# Patient Record
Sex: MALE | Race: WHITE | Employment: FULL TIME | URBAN - METROPOLITAN AREA
[De-identification: names, ages, dates, MRNs, and addresses within clinical notes are randomized per-mention and may not be internally consistent; named-entity substitution may affect disease eponyms.]

---

## 2017-01-10 DIAGNOSIS — E11.21 TYPE 2 DIABETES MELLITUS WITH DIABETIC NEPHROPATHY (H): Primary | Chronic | ICD-10-CM

## 2017-01-10 NOTE — TELEPHONE ENCOUNTER
metFORMIN (GLUCOPHAGE-XR) 500 MG 24 hr tablet 360 tablet 1 7/14/2016            Last Written Prescription Date: 07/14/16  Last Fill Quantity: 360, # refills: 1  Last Office Visit with [facility], P or [facility] prescribing provider:  12/15/15        BP Readings from Last 3 Encounters:   06/14/16 136/70   06/12/16 136/62   05/02/16 186/100     MICROL      126   5/7/2015  No results found for this basename: microalbumin  CREATININE   Date Value Ref Range Status   06/12/2016 0.82 0.66 - 1.25 mg/dL Final   ]  GFR ESTIMATE   Date Value Ref Range Status   06/12/2016 >90  Non  GFR Calc   >60 mL/min/1.7m2 Final   05/02/2016 77 >60 mL/min/1.7m2 Final     Comment:     Non  GFR Calc   12/15/2015 >90  Non  GFR Calc   >60 mL/min/1.7m2 Final     GFR ESTIMATE IF BLACK   Date Value Ref Range Status   06/12/2016 >90   GFR Calc   >60 mL/min/1.7m2 Final   05/02/2016 >90   GFR Calc   >60 mL/min/1.7m2 Final   12/15/2015 >90   GFR Calc   >60 mL/min/1.7m2 Final     CHOL      200   12/15/2015  HDL       44   12/15/2015  LDL       98   12/15/2015  TRIG      288   12/15/2015  CHOLHDLRATIO      4.8   5/7/2015  AST       38   5/2/2016  ALT       96   5/2/2016  A1C      7.2   6/13/2016  A1C      7.1   12/15/2015  A1C      7.1   5/7/2015  A1C      7.2   3/19/2014  A1C      6.0   9/18/2013  POTASSIUM   Date Value Ref Range Status   06/12/2016 3.8 3.4 - 5.3 mmol/L Final

## 2017-01-11 RX ORDER — METFORMIN HCL 500 MG
1000 TABLET, EXTENDED RELEASE 24 HR ORAL 2 TIMES DAILY WITH MEALS
Qty: 120 TABLET | Refills: 0 | Status: SHIPPED
Start: 2017-01-11 | End: 2017-03-03

## 2017-01-11 NOTE — TELEPHONE ENCOUNTER
Medication is being filled for 1 time refill only due to:  DUE FOR OFFICE VISIT AND LABS, NOTED ON RX   Ayanna Pineda RN

## 2017-02-06 ENCOUNTER — TELEPHONE (OUTPATIENT)
Dept: FAMILY MEDICINE | Facility: CLINIC | Age: 57
End: 2017-02-06

## 2017-02-06 DIAGNOSIS — Z79.01 LONG TERM (CURRENT) USE OF ANTICOAGULANTS: ICD-10-CM

## 2017-02-06 DIAGNOSIS — I82.409 DVT (DEEP VENOUS THROMBOSIS) (H): Primary | ICD-10-CM

## 2017-02-06 NOTE — TELEPHONE ENCOUNTER
Reason for call: Medication   If this is a refill request, has the caller requested the refill from the pharmacy already? Yes  Will the patient be using a [facility]? No  Name of the pharmacy and phone number for the current request:   [facility] - [address]             Name of the medication requested: Eliquis 5mg    Other request: N/A    Phone Number Pt can be reached at: Work number on file:  [phone] (work)  Best Time: With any further questions  Can we leave a detailed message on this number? YES

## 2017-02-06 NOTE — TELEPHONE ENCOUNTER
Ordered, patient called and notified that his Eliquis was ordered for another 30 days only until his 3/3/17 Physical with Dr. Pradhan.  Patient agrees with the plan.  Leanne Olivo RN

## 2017-02-11 DIAGNOSIS — I10 ESSENTIAL HYPERTENSION: Primary | Chronic | ICD-10-CM

## 2017-02-13 RX ORDER — ATORVASTATIN CALCIUM 40 MG/1
TABLET, FILM COATED ORAL
Qty: 30 TABLET | Refills: 0 | Status: SHIPPED | OUTPATIENT
Start: 2017-02-13 | End: 2017-03-03

## 2017-02-13 NOTE — TELEPHONE ENCOUNTER
Medication is being filled for 1 time refill only due to:  upcoming appt   Jennifer ESQUIVEL RN

## 2017-02-13 NOTE — TELEPHONE ENCOUNTER
atorvastatin (LIPITOR) 40 MG tablet 90 tablet 3 12/15/2015          Last Written Prescription Date: 12/15/2015  Last Fill Quantity: 90, # refills: 3  Last Office Visit with [facility], [facility] or [facility] prescribing provider: 12/15/2015  Next 5 appointments (look out 90 days)     Mar 03, 2017  9:00 AM CST   [facility]    [address] [facility] [address]   [phone]                   Potassium   Date Value Ref Range Status   06/12/2016 3.8 3.4 - 5.3 mmol/L Final     Creatinine   Date Value Ref Range Status   06/12/2016 0.82 0.66 - 1.25 mg/dL Final     BP Readings from Last 3 Encounters:   06/14/16 136/70   06/12/16 136/62   05/02/16 (!) 186/100

## 2017-03-03 ENCOUNTER — OFFICE VISIT (OUTPATIENT)
Dept: FAMILY MEDICINE | Facility: CLINIC | Age: 57
End: 2017-03-03
Payer: COMMERCIAL

## 2017-03-03 VITALS
DIASTOLIC BLOOD PRESSURE: 62 MMHG | SYSTOLIC BLOOD PRESSURE: 126 MMHG | HEART RATE: 82 BPM | WEIGHT: 315 LBS | OXYGEN SATURATION: 94 % | BODY MASS INDEX: 40.43 KG/M2 | HEIGHT: 74 IN | TEMPERATURE: 99 F

## 2017-03-03 DIAGNOSIS — Z11.59 NEED FOR HEPATITIS C SCREENING TEST: ICD-10-CM

## 2017-03-03 DIAGNOSIS — I82.451: ICD-10-CM

## 2017-03-03 DIAGNOSIS — L71.9 ROSACEA: ICD-10-CM

## 2017-03-03 DIAGNOSIS — R68.82 DECREASED SEX DRIVE: ICD-10-CM

## 2017-03-03 DIAGNOSIS — I10 ESSENTIAL HYPERTENSION: Chronic | ICD-10-CM

## 2017-03-03 DIAGNOSIS — L84 CORN OF TOE: ICD-10-CM

## 2017-03-03 DIAGNOSIS — Z87.442 HISTORY OF NEPHROLITHIASIS: ICD-10-CM

## 2017-03-03 DIAGNOSIS — E11.21 TYPE 2 DIABETES MELLITUS WITH DIABETIC NEPHROPATHY, WITHOUT LONG-TERM CURRENT USE OF INSULIN (H): Chronic | ICD-10-CM

## 2017-03-03 DIAGNOSIS — Z79.01 LONG TERM (CURRENT) USE OF ANTICOAGULANTS: ICD-10-CM

## 2017-03-03 DIAGNOSIS — Z00.01 ENCOUNTER FOR PREVENTATIVE ADULT HEALTH CARE EXAM WITH ABNORMAL FINDINGS: Primary | ICD-10-CM

## 2017-03-03 DIAGNOSIS — E66.01 MORBID OBESITY, UNSPECIFIED OBESITY TYPE (H): ICD-10-CM

## 2017-03-03 DIAGNOSIS — L91.8 SKIN TAG: ICD-10-CM

## 2017-03-03 LAB
D DIMER PPP FEU-MCNC: 0.2 UG/ML FEU (ref 0–0.5)
ERYTHROCYTE [DISTWIDTH] IN BLOOD BY AUTOMATED COUNT: 13 % (ref 10–15)
HBA1C MFR BLD: 7.5 % (ref 4.3–6)
HCT VFR BLD AUTO: 38.2 % (ref 40–53)
HCV AB SERPL QL IA: NORMAL
HGB BLD-MCNC: 12.4 G/DL (ref 13.3–17.7)
MCH RBC QN AUTO: 30.8 PG (ref 26.5–33)
MCHC RBC AUTO-ENTMCNC: 32.5 G/DL (ref 31.5–36.5)
MCV RBC AUTO: 95 FL (ref 78–100)
PLATELET # BLD AUTO: 181 10E9/L (ref 150–450)
RBC # BLD AUTO: 4.02 10E12/L (ref 4.4–5.9)
WBC # BLD AUTO: 6.7 10E9/L (ref 4–11)

## 2017-03-03 PROCEDURE — 80053 COMPREHEN METABOLIC PANEL: CPT | Performed by: INTERNAL MEDICINE

## 2017-03-03 PROCEDURE — 82607 VITAMIN B-12: CPT | Performed by: INTERNAL MEDICINE

## 2017-03-03 PROCEDURE — 86803 HEPATITIS C AB TEST: CPT | Performed by: INTERNAL MEDICINE

## 2017-03-03 PROCEDURE — 82043 UR ALBUMIN QUANTITATIVE: CPT | Performed by: INTERNAL MEDICINE

## 2017-03-03 PROCEDURE — 85027 COMPLETE CBC AUTOMATED: CPT | Performed by: INTERNAL MEDICINE

## 2017-03-03 PROCEDURE — 84443 ASSAY THYROID STIM HORMONE: CPT | Performed by: INTERNAL MEDICINE

## 2017-03-03 PROCEDURE — 99396 PREV VISIT EST AGE 40-64: CPT | Performed by: INTERNAL MEDICINE

## 2017-03-03 PROCEDURE — 85379 FIBRIN DEGRADATION QUANT: CPT | Performed by: INTERNAL MEDICINE

## 2017-03-03 PROCEDURE — 80061 LIPID PANEL: CPT | Performed by: INTERNAL MEDICINE

## 2017-03-03 PROCEDURE — 99212 OFFICE O/P EST SF 10 MIN: CPT | Mod: 25 | Performed by: INTERNAL MEDICINE

## 2017-03-03 PROCEDURE — 84403 ASSAY OF TOTAL TESTOSTERONE: CPT | Performed by: INTERNAL MEDICINE

## 2017-03-03 PROCEDURE — 83036 HEMOGLOBIN GLYCOSYLATED A1C: CPT | Performed by: INTERNAL MEDICINE

## 2017-03-03 PROCEDURE — 36415 COLL VENOUS BLD VENIPUNCTURE: CPT | Performed by: INTERNAL MEDICINE

## 2017-03-03 RX ORDER — METFORMIN HCL 500 MG
1000 TABLET, EXTENDED RELEASE 24 HR ORAL 2 TIMES DAILY WITH MEALS
Qty: 360 TABLET | Refills: 3 | Status: SHIPPED | OUTPATIENT
Start: 2017-03-03 | End: 2018-04-09

## 2017-03-03 RX ORDER — LISINOPRIL 10 MG/1
10 TABLET ORAL DAILY
Qty: 90 TABLET | Refills: 3 | Status: SHIPPED | OUTPATIENT
Start: 2017-03-03 | End: 2018-02-26

## 2017-03-03 RX ORDER — ATORVASTATIN CALCIUM 40 MG/1
40 TABLET, FILM COATED ORAL DAILY
Qty: 90 TABLET | Refills: 3 | Status: SHIPPED | OUTPATIENT
Start: 2017-03-03 | End: 2018-03-28

## 2017-03-03 RX ORDER — MINOCYCLINE HYDROCHLORIDE 100 MG/1
100 CAPSULE ORAL DAILY
Qty: 90 CAPSULE | Refills: 3 | Status: SHIPPED | OUTPATIENT
Start: 2017-03-03 | End: 2018-04-09

## 2017-03-03 NOTE — PROGRESS NOTES
SUBJECTIVE:     CC: Peterson Vazquez is an 56 year old male who presents for preventative health visit.     Healthy Habits:    Do you get at least three servings of calcium containing foods daily (dairy, green leafy vegetables, etc.)? yes    Amount of exercise or daily activities, outside of work: 0 day(s) per week    Problems taking medications regularly No    Medication side effects: No    Have you had an eye exam in the past two years? yes    Do you see a dentist twice per year? yes    Do you have sleep apnea, excessive snoring or daytime drowsiness? yes, currently using CPAP    Adrian presents to the clinic today for his annual physical. He is fasting today.    Stressors- His father is [place] very well. He is happy and making friends. Adrian says it was a rough time and he got sick twice in the past few months. Work is not a main stressor- more watching his father's health fade. He knows he needs to start taking better care of himself. He works 12 hour days and says it is very tough to fit in exercise. The political scene also adds to his stress. Wife supportive.    H/o DVT- Placed on Eliquis 5 mg d/t right peroneal DVT in his leg in June. Denies bleeding or excessive ecchymosis. We discussed following up with hematology to determine future risk of clots and continuation of Eliquis since he did not f/u with me or them after his clot. No known provoking factors other than obesity and chronic venous insufficiency . Cellulitis resolved.    Of Note-  Uses CPAP every night.  Does not check his blood sugar. Denies nerve pain but notes paresthesia.  Complains of decreased sex drive, doesn't believe its an ED issue, but a mental or hormonal.   Kidney stone that Adrian said passed on its own. He noted dark gritty sand in his urine. Denies problems since then.   Tolerating minocycline 100 mg for rosacea and wants to continue it.  Denies urinary changes or family h/o prostate cancer.  Denies recent changes in  "moles, or painful itchy spots.    Today's PHQ-2 Score:   PHQ-2 ( 1999 [employer]) 3/3/2017 6/12/2016   Q1: Little interest or pleasure in doing things 0 0   Q2: Feeling down, depressed or hopeless 0 0   PHQ-2 Score 0 0       Abuse: Current or Past(Physical, Sexual or Emotional)- No  Do you feel safe in your environment - Yes    Social History   Substance Use Topics     Smoking status: Never Smoker     Smokeless tobacco: Never Used     Alcohol use 0.0 oz/week     0 Standard drinks or equivalent per week      Comment: rarely     The patient does not drink >3 drinks per day nor >7 drinks per week.    Last PSA: No results found for: PSA    Recent Labs   Lab Test  12/15/15   1334  05/07/15   1005  03/19/14   0846   CHOL  200*  171  200*   HDL  44  36*  40*   LDL  98  93  103   TRIG  288*  208*  283*   CHOLHDLRATIO   --   4.8  5.0   NHDL  156*   --    --        Reviewed orders with patient. Reviewed health maintenance and updated orders accordingly - Yes    Reviewed and updated as needed this visit by clinical staff  Reviewed and updated as needed this visit by Provider       ROS:  Comprehensive ROS negative unless as stated above in HPI.    This document serves as a record of the services and decisions personally performed and made by Yazmin Pradhan DO. It was created on his/her behalf by Yola Bobo, a trained medical scribe. The creation of this document is based the provider's statements to the medical scribe.  Scribsonia Bobo 9:11 AM, March 3, 2017  OBJECTIVE:     /62 (BP Location: Right arm, Patient Position: Chair, Cuff Size: Adult Large)  Pulse 82  Temp 99  F (37.2  C) (Oral)  Ht 6' 2\" (1.88 m)  Wt (!) 341 lb (154.7 kg)  SpO2 94%  BMI 43.78 kg/m2  EXAM:  GENERAL: obese, alert and no distress  EYES: Eyes grossly normal to inspection, PERRL and conjunctivae and sclerae normal  HENT: ear canals and TM's normal, nose and mouth without ulcers or lesions, slight cerumen bilaterally, scab on the " tip of nose  NECK: no adenopathy, no asymmetry, masses, or scars and thyroid normal to palpation  RESP: lungs clear to auscultation - no rales, rhonchi or wheezes  CV: regular rate and rhythm, normal S1 S2, no S3 or S4, no murmur, click or rub, no carotid bruits, 3+ pitting pedal edema right and 2+ on left  ABDOMEN: soft, nontender, no hepatosplenomegaly, no masses and bowel sounds normal, morbidly obese  MS: no gross musculoskeletal defects noted, right lower extremity edema and dusky erythema d/t venous stasis, no pain with palpation, third toe right foot callous, dorsal pedalis pulse 2+ left, 1+ right, wears diabetic socks   SKIN: no suspicious lesions or rashes, seborrheic keratosis on neck   NEURO: Normal strength and tone, mentation intact and speech normal  PSYCH: mentation appears normal, affect normal/bright    ASSESSMENT/PLAN:     1. Encounter for preventative adult health care exam with abnormal findings  He has awareness to start working on caring for himself now that his Dad moved out of their home and he can have less caregiver stress    2. Type 2 diabetes mellitus with diabetic nephropathy, without long-term current use of insulin and with morbid obesity  Not checking sugars  - FOOT EXAM  NO CHARGE [30267.114]  - HEMOGLOBIN A1C  - Lipid panel reflex to direct LDL  - Albumin Random Urine Quantitative  - metFORMIN (GLUCOPHAGE-XR) 500 MG 24 hr tablet; Take 2 tablets (1,000 mg) by mouth 2 times daily (with meals)  Dispense: 360 tablet; Refill: 3  - Comprehensive metabolic panel  - TSH with free T4 reflex  - Vitamin B12    3. Essential hypertension; goal < 140/90  At goal  - atorvastatin (LIPITOR) 40 MG tablet; Take 1 tablet (40 mg) by mouth daily  Dispense: 90 tablet; Refill: 3  - lisinopril (PRINIVIL/ZESTRIL) 10 MG tablet; Take 1 tablet (10 mg) by mouth daily  Dispense: 90 tablet; Refill: 3    4. Rosacea  Well controlled  - minocycline (MINOCIN/DYNACIN) 100 MG capsule; Take 1 capsule (100 mg) by mouth  "daily Take with full glass of water  Dispense: 90 capsule; Refill: 3    5. Deep venous thrombosis (DVT) of right peroneal vein (H)  D-dimer as baseline  Needs f/u work up with heme as no known risk factors for his clot other than obesity and chronic venous insufficiency   - D dimer, quantitative  - CBC with platelets  - ONC/HEME ADULT REFERRAL    6. Decreased sex drive  Discussed likely r/t obesity and diabetes as well as age but will check testosterone   - Testosterone, total    7. Long term (current) use of anticoagulants  Continue Eliquis until seen by heme  - apixaban ANTICOAGULANT (ELIQUIS) 5 MG tablet; Take 1 tablet (5 mg) by mouth 2 times daily  Dispense: 60 tablet; Refill: 0    8. Need for hepatitis C screening test  - Hepatitis C Screen Reflex to HCV RNA Quant and Genotype    9. Skin lesion  - DERMATOLOGY REFERRAL   As noted above in exam    10. Corn of toe  - PODIATRY/FOOT & ANKLE SURGERY REFERRAL    COUNSELING:  Reviewed preventive health counseling, as reflected in patient instructions       Regular exercise       Healthy diet/nutrition   reports that he has never smoked. He has never used smokeless tobacco.  Estimated body mass index is 43.78 kg/(m^2) as calculated from the following:    Height as of this encounter: 6' 2\" (1.88 m).    Weight as of this encounter: 341 lb (154.7 kg).   Weight management plan: Discussed healthy diet and exercise guidelines and patient will follow up in 12 months in clinic to re-evaluate.    Patient Instructions   Labs today  Think about reading a book called \"Younger Next Year\"  Referral for hematology, podiatry and dermatology  Follow up in 6 months or as needed based on labs    The information in this document, created by the medical scribe for me, accurately reflects the services I personally performed and the decisions made by me. I have reviewed and approved this document for accuracy prior to leaving the patient care area.  Yazmin Pradhan, DO  9:10 AM, " 03/03/17    Yazmin Pradhan, [facility]

## 2017-03-03 NOTE — PATIENT INSTRUCTIONS
"Labs today  Think about reading a book called \"Younger Next Year\"  Referral for hematology, podiatry and dermatology  Follow up in 6 months or as needed based on labs    Preventive Health Recommendations  Male Ages 50   64    Yearly exam:             See your health care provider every year in order to  o   Review health changes.   o   Discuss preventive care.    o   Review your medicines if your doctor has prescribed any.     Have a cholesterol test every 5 years, or more frequently if you are at risk for high cholesterol/heart disease.     Have a diabetes test (fasting glucose) every three years. If you are at risk for diabetes, you should have this test more often.     Have a colonoscopy at age 50, or have a yearly FIT test (stool test). These exams will check for colon cancer.      Talk with your health care provider about whether or not a prostate cancer screening test (PSA) is right for you.    You should be tested each year for STDs (sexually transmitted diseases), if you re at risk.     Shots: Get a flu shot each year. Get a tetanus shot every 10 years.     Nutrition:    Eat at least 5 servings of fruits and vegetables daily.     Eat whole-grain bread, whole-wheat pasta and brown rice instead of white grains and rice.     Talk to your provider about Calcium and Vitamin D.     Lifestyle    Exercise for at least 150 minutes a week (30 minutes a day, 5 days a week). This will help you control your weight and prevent disease.     Limit alcohol to one drink per day.     No smoking.     Wear sunscreen to prevent skin cancer.     See your dentist every six months for an exam and cleaning.     See your eye doctor every 1 to 2 years.    "

## 2017-03-03 NOTE — NURSING NOTE
"Chief Complaint   Patient presents with     Physical       Initial /62 (BP Location: Right arm, Patient Position: Chair, Cuff Size: Adult Large)  Pulse 82  Temp 99  F (37.2  C) (Oral)  Ht 6' 2\" (1.88 m)  Wt (!) 341 lb (154.7 kg)  SpO2 94%  BMI 43.78 kg/m2 Estimated body mass index is 43.78 kg/(m^2) as calculated from the following:    Height as of this encounter: 6' 2\" (1.88 m).    Weight as of this encounter: 341 lb (154.7 kg).  Medication Reconciliation: complete     Kapil Grimaldo, MATTHEW     "

## 2017-03-03 NOTE — MR AVS SNAPSHOT
"              After Visit Summary   3/3/2017    Peterson Vazquez    MRN: [id number]           Patient Information     Date Of Birth          1960        Visit Information        Provider Department      3/3/2017 9:00 AM Yazmin Pradhan,  [facility]        Today's Diagnoses     Encounter for preventative adult health care exam with abnormal findings    -  1    Type 2 diabetes mellitus with diabetic nephropathy, without long-term current use of insulin (H)        Essential hypertension; goal < 140/90        Rosacea        Deep venous thrombosis (DVT) of right peroneal vein (H)        Decreased sex drive        Long term (current) use of anticoagulants        Need for hepatitis C screening test        Skin tag        Corn of toe          Care Instructions    Labs today  Think about reading a book called \"Younger Next Year\"  Referral for hematology, podiatry and dermatology  Follow up in 6 months or as needed based on labs    Preventive Health Recommendations  Male Ages 50 - 64    Yearly exam:             See your health care provider every year in order to  o   Review health changes.   o   Discuss preventive care.    o   Review your medicines if your doctor has prescribed any.     Have a cholesterol test every 5 years, or more frequently if you are at risk for high cholesterol/heart disease.     Have a diabetes test (fasting glucose) every three years. If you are at risk for diabetes, you should have this test more often.     Have a colonoscopy at age 50, or have a yearly FIT test (stool test). These exams will check for colon cancer.      Talk with your health care provider about whether or not a prostate cancer screening test (PSA) is right for you.    You should be tested each year for STDs (sexually transmitted diseases), if you re at risk.     Shots: Get a flu shot each year. Get a tetanus shot every 10 years.     Nutrition:    Eat at least 5 servings of fruits and vegetables daily.     Eat " whole-grain bread, whole-wheat pasta and brown rice instead of white grains and rice.     Talk to your provider about Calcium and Vitamin D.     Lifestyle    Exercise for at least 150 minutes a week (30 minutes a day, 5 days a week). This will help you control your weight and prevent disease.     Limit alcohol to one drink per day.     No smoking.     Wear sunscreen to prevent skin cancer.     See your dentist every six months for an exam and cleaning.     See your eye doctor every 1 to 2 years.          Follow-ups after your visit        Additional Services     DERMATOLOGY REFERRAL       Your provider has referred you to: [facility]: [facility] [place] [phone]   [email or web address]    Please be aware that coverage of these services is subject to the terms and limitations of your health insurance plan.  Call member services at your health plan with any benefit or coverage questions.      Please bring the following with you to your appointment:    (1) Any X-Rays, CTs or MRIs which have been performed.  Contact the facility where they were done to arrange for  prior to your scheduled appointment.    (2) List of current medications  (3) This referral request   (4) Any documents/labs given to you for this referral            ONC/HEME ADULT REFERRAL       Your provider has referred you to: [facility]: [facility] - [place] [phone]. [facility] [place] [phone]    [email or web address]    Please be aware that coverage of these services is subject to the terms and limitations of your health insurance plan.  Call member services at your health plan with any benefit or coverage questions.      Please bring the following with you to your appointment:    (1) Any X-Rays, CTs or MRIs which have been performed.  Contact the facility where  "they were done to arrange for  prior to your scheduled appointment.   (2) List of current medications  (3) This referral request   (4) Any documents/labs given to you for this referral            PODIATRY/FOOT & ANKLE SURGERY REFERRAL       Your provider has referred you to: [facility]: Kofi Kebede[facility] [phone]   [email or web address]    Please be aware that coverage of these services is subject to the terms and limitations of your health insurance plan.  Call member services at your health plan with any benefit or coverage questions.      Please bring the following to your appointment:  >>   Any x-rays, CTs or MRIs which have been performed.  Contact the facility where they were done to arrange for  prior to your scheduled appointment.    >>   List of current medications   >>   This referral request   >>   Any documents/labs given to you for this referral                  Who to contact     If you have questions or need follow up information about today's clinic visit or your schedule please contact [facility] directly at [phone].  Normal or non-critical lab and imaging results will be communicated to you by [employer], letter or phone within 4 business days after the clinic has received the results. If you do not hear from us within 7 days, please contact the clinic through [employer] or phone. If you have a critical or abnormal lab result, we will notify you by phone as soon as possible.  Submit refill requests through [employer] or call your pharmacy and they will forward the refill request to us. Please allow 3 business days for your refill to be completed.          Additional Information About Your Visit        [email or web address] Information     [employer] lets you send messages to your doctor, view your test results, renew your prescriptions, schedule appointments and more. To sign up, go to [email or web address] . Click on \"Log in\" on the left side of the screen, " "which will take you to the Welcome page. Then click on \"Sign up Now\" on the right side of the page.     You will be asked to enter the access code listed below, as well as some personal information. Please follow the directions to create your username and password.     Your access code is: [email or web address]  Expires: 2017  9:52 AM     Your access code will  in 90 days. If you need help or a new code, please call your [place] clinic or [phone].        Care EveryWhere ID     This is your Care EveryWhere ID. This could be used by other organizations to access your [place] medical records  [id number]        Your Vitals Were     Pulse Temperature Height Pulse Oximetry BMI (Body Mass Index)       82 99  F (37.2  C) (Oral) 6' 2\" (1.88 m) 94% 43.78 kg/m2        Blood Pressure from Last 3 Encounters:   17 126/62   16 136/70   16 136/62    Weight from Last 3 Encounters:   17 (!) 341 lb (154.7 kg)   16 (!) 319 lb (144.7 kg)   16 (!) 319 lb (144.7 kg)              We Performed the Following     Albumin Random Urine Quantitative     CBC with platelets     Comprehensive metabolic panel     D dimer, quantitative     DERMATOLOGY REFERRAL     FOOT EXAM  NO CHARGE [16276.114]     HEMOGLOBIN A1C     Hepatitis C Screen Reflex to HCV RNA Quant and Genotype     Lipid panel reflex to direct LDL     ONC/HEME ADULT REFERRAL     PODIATRY/FOOT & ANKLE SURGERY REFERRAL     Testosterone, total     TSH with free T4 reflex     Vitamin B12          Today's Medication Changes          These changes are accurate as of: 3/3/17  9:52 AM.  If you have any questions, ask your nurse or doctor.               These medicines have changed or have updated prescriptions.        Dose/Directions    atorvastatin 40 MG tablet   Commonly known as:  LIPITOR   This may have changed:  See the new instructions.   Used for:  Essential hypertension   Changed by:  Yazmin Pradhan, DO        Dose:  40 mg   Take 1 tablet " (40 mg) by mouth daily   Quantity:  90 tablet   Refills:  3       lisinopril 10 MG tablet   Commonly known as:  PRINIVIL/ZESTRIL   This may have changed:  See the new instructions.   Used for:  Essential hypertension   Changed by:  Yazmin Pradhan DO        Dose:  10 mg   Take 1 tablet (10 mg) by mouth daily   Quantity:  90 tablet   Refills:  3       metFORMIN 500 MG 24 hr tablet   Commonly known as:  GLUCOPHAGE-XR   This may have changed:  additional instructions   Used for:  Type 2 diabetes mellitus with diabetic nephropathy, without long-term current use of insulin (H)   Changed by:  Yazmin Pradhan DO        Dose:  1000 mg   Take 2 tablets (1,000 mg) by mouth 2 times daily (with meals)   Quantity:  360 tablet   Refills:  3            Where to get your medicines      These medications were sent to [facility] - [address] [address]     Phone:  [phone]     apixaban ANTICOAGULANT 5 MG tablet    atorvastatin 40 MG tablet    lisinopril 10 MG tablet    metFORMIN 500 MG 24 hr tablet    minocycline 100 MG capsule                Primary Care Provider Office Phone # Fax #    Yazmin Pradhan [phone]       [address] [address]        Thank you!     Thank you for choosing [facility]  for your care. Our goal is always to provide you with excellent care. Hearing back from our patients is one way we can continue to improve our services. Please take a few minutes to complete the written survey that you may receive in the mail after your visit with us. Thank you!             Your Updated Medication List - Protect others around you: Learn how to safely use, store and throw away your medicines at www.disposemymeds.org.          This list is accurate as of: 3/3/17  9:52 AM.  Always use your most recent med list.                   Brand Name Dispense Instructions for  use    apixaban ANTICOAGULANT 5 MG tablet    ELIQUIS    60 tablet    Take 1 tablet (5 mg) by mouth 2 times daily       atorvastatin 40 MG tablet    LIPITOR    90 tablet    Take 1 tablet (40 mg) by mouth daily       B-12 1000 MCG Tbcr     100 tablet    Take 1,000 mcg by mouth daily       blood glucose monitoring meter device kit    no brand specified    1 kit    1 Device daily       cholecalciferol 1000 UNIT tablet    vitamin D    100 tablet    Take 1 tablet (1,000 Units) by mouth daily       DAILY MULTIVITAMIN PO      Take  by mouth.       lisinopril 10 MG tablet    PRINIVIL/ZESTRIL    90 tablet    Take 1 tablet (10 mg) by mouth daily       LORATADINE TABS 10 MG (RAPID) OR     90    1 tab po QD       metFORMIN 500 MG 24 hr tablet    GLUCOPHAGE-XR    360 tablet    Take 2 tablets (1,000 mg) by mouth 2 times daily (with meals)       minocycline 100 MG capsule    MINOCIN/DYNACIN    90 capsule    Take 1 capsule (100 mg) by mouth daily Take with full glass of water       * order for DME     4 each    Equipment being ordered: Bilateral knee high compression stockings 15-20 mmHg; he'd prefer to try open-toe       * order for DME     1 Device    Equipment being ordered: compression hose   15-20mmhg Bilateral knee high       * Notice:  This list has 2 medication(s) that are the same as other medications prescribed for you. Read the directions carefully, and ask your doctor or other care provider to review them with you.

## 2017-03-04 LAB
ALBUMIN SERPL-MCNC: 3.9 G/DL (ref 3.4–5)
ALP SERPL-CCNC: 95 U/L (ref 40–150)
ALT SERPL W P-5'-P-CCNC: 47 U/L (ref 0–70)
ANION GAP SERPL CALCULATED.3IONS-SCNC: 7 MMOL/L (ref 3–14)
AST SERPL W P-5'-P-CCNC: 25 U/L (ref 0–45)
BILIRUB SERPL-MCNC: 0.9 MG/DL (ref 0.2–1.3)
BUN SERPL-MCNC: 20 MG/DL (ref 7–30)
CALCIUM SERPL-MCNC: 9.4 MG/DL (ref 8.5–10.1)
CHLORIDE SERPL-SCNC: 103 MMOL/L (ref 94–109)
CHOLEST SERPL-MCNC: 190 MG/DL
CO2 SERPL-SCNC: 28 MMOL/L (ref 20–32)
CREAT SERPL-MCNC: 0.91 MG/DL (ref 0.66–1.25)
CREAT UR-MCNC: 95 MG/DL
GFR SERPL CREATININE-BSD FRML MDRD: 85 ML/MIN/1.7M2
GLUCOSE SERPL-MCNC: 185 MG/DL (ref 70–99)
HDLC SERPL-MCNC: 44 MG/DL
LDLC SERPL CALC-MCNC: 87 MG/DL
MICROALBUMIN UR-MCNC: 107 MG/L
MICROALBUMIN/CREAT UR: 112.51 MG/G CR (ref 0–17)
NONHDLC SERPL-MCNC: 146 MG/DL
POTASSIUM SERPL-SCNC: 5.1 MMOL/L (ref 3.4–5.3)
PROT SERPL-MCNC: 6.8 G/DL (ref 6.8–8.8)
SODIUM SERPL-SCNC: 138 MMOL/L (ref 133–144)
TRIGL SERPL-MCNC: 294 MG/DL
TSH SERPL DL<=0.005 MIU/L-ACNC: 1.29 MU/L (ref 0.4–4)
VIT B12 SERPL-MCNC: 941 PG/ML (ref 193–986)

## 2017-03-07 LAB — TESTOST SERPL-MCNC: 322 NG/DL (ref 240–950)

## 2017-03-12 PROBLEM — Z87.442 HISTORY OF NEPHROLITHIASIS: Chronic | Status: ACTIVE | Noted: 2017-03-12

## 2017-03-12 PROBLEM — Z87.442 HISTORY OF NEPHROLITHIASIS: Status: ACTIVE | Noted: 2017-03-12

## 2017-03-12 PROBLEM — I82.451: Status: ACTIVE | Noted: 2017-03-12

## 2017-03-28 ENCOUNTER — ONCOLOGY VISIT (OUTPATIENT)
Dept: ONCOLOGY | Facility: CLINIC | Age: 57
End: 2017-03-28
Attending: PHYSICIAN ASSISTANT
Payer: COMMERCIAL

## 2017-03-28 VITALS
OXYGEN SATURATION: 95 % | TEMPERATURE: 97.8 F | WEIGHT: 315 LBS | RESPIRATION RATE: 18 BRPM | HEART RATE: 86 BPM | SYSTOLIC BLOOD PRESSURE: 144 MMHG | DIASTOLIC BLOOD PRESSURE: 80 MMHG | BODY MASS INDEX: 42.83 KG/M2

## 2017-03-28 DIAGNOSIS — Z86.718 HISTORY OF DEEP VENOUS THROMBOSIS: Primary | ICD-10-CM

## 2017-03-28 PROCEDURE — 99203 OFFICE O/P NEW LOW 30 MIN: CPT | Performed by: PHYSICIAN ASSISTANT

## 2017-03-28 PROCEDURE — 99211 OFF/OP EST MAY X REQ PHY/QHP: CPT

## 2017-03-28 ASSESSMENT — PAIN SCALES - GENERAL: PAINLEVEL: NO PAIN (0)

## 2017-03-28 NOTE — PATIENT INSTRUCTIONS
1.  Stop Eliquis.  2.  Call with high risk times for venous clots (book taina).  3.  Ultrasound of right leg (after April 15th-Friday am); Eneida to call with exact time at [facility].

## 2017-03-28 NOTE — MR AVS SNAPSHOT
After Visit Summary   3/28/2017    Peterson Vazquez    MRN: [id number]           Patient Information     Date Of Birth          1960        Visit Information        Provider Department      3/28/2017 9:00 AM Brett Chowdhury PA-C [facility]        Today's Diagnoses     History of deep venous thrombosis    -  1      Care Instructions    1.  Stop Eliquis.  2.  Call with high risk times for venous clots (book taina).  3.  Ultrasound of right leg (after April 15th-Friday am); Eneida to call with exact time at [facility].        Follow-ups after your visit        Your next 10 appointments already scheduled     Apr 21, 2017  9:00 AM CDT   US LOWER EXTREMITY VENOUS DUPLEX RIGHT with SHUS5   [facility] ([facility])    [address] [place] [id number] [phone]           Please bring a list of your medicines (including vitamins, minerals and over-the-counter drugs). Also, tell your doctor about any allergies you may have. Wear comfortable clothes and leave your valuables at home.  You do not need to do anything special to prepare for your exam.  Please call the Imaging Department at your exam site with any questions.              Future tests that were ordered for you today     Open Future Orders        Priority Expected Expires Ordered    US Lower Extremity Venous Duplex RT Routine 3/31/2017 6/28/2017 3/28/2017            Who to contact     If you have questions or need follow up information about today's clinic visit or your schedule please contact [facility] directly at [phone].  Normal or non-critical lab and imaging results will be communicated to you by MyChart, letter or phone within 4 business days after the clinic has received the results. If you do not hear from us within 7 days, please contact the clinic through MyChart or phone. If you have a critical or abnormal lab result, we will notify you by phone as soon as  possible.  Submit refill requests through [employer] or call your pharmacy and they will forward the refill request to us. Please allow 3 business days for your refill to be completed.          Additional Information About Your Visit        Care EveryWhere ID     This is your Care EveryWhere ID. This could be used by other organizations to access your [place] medical records  [id number]        Your Vitals Were     Pulse Temperature Respirations Pulse Oximetry BMI (Body Mass Index)       86 97.8  F (36.6  C) (Oral) 18 95% 42.83 kg/m2        Blood Pressure from Last 3 Encounters:   03/28/17 144/80   03/03/17 126/62   06/14/16 136/70    Weight from Last 3 Encounters:   03/28/17 (!) 151.3 kg (333 lb 9.6 oz)   03/03/17 (!) 154.7 kg (341 lb)   06/12/16 (!) 144.7 kg (319 lb)                 Today's Medication Changes          These changes are accurate as of: 3/28/17  2:43 PM.  If you have any questions, ask your nurse or doctor.               Start taking these medicines.        Dose/Directions    COMPRESSION STOCKINGS   Used for:  History of deep venous thrombosis        Please wear compression stockings in the affected leg (right) or both legs most time during the day and take them off at night.   Quantity:  2 each   Refills:  2            Where to get your medicines      Some of these will need a paper prescription and others can be bought over the counter.  Ask your nurse if you have questions.     Bring a paper prescription for each of these medications     COMPRESSION STOCKINGS                Primary Care Provider Office Phone # Fax #    Yazmin Xi Pradhan [phone]       [facility] [address] [place] [facility]   [facility] [address]        Thank you!     Thank you for choosing [facility] [place] [facility]  for your care. Our goal is always to provide you with excellent care. Hearing back from our patients is one way we can continue to improve our services. Please take a few minutes to complete the  written survey that you may receive in the mail after your visit with us. Thank you!             Your Updated Medication List - Protect others around you: Learn how to safely use, store and throw away your medicines at www.disposemymeds.org.          This list is accurate as of: 3/28/17  2:43 PM.  Always use your most recent med list.                   Brand Name Dispense Instructions for use    apixaban ANTICOAGULANT 5 MG tablet    ELIQUIS    60 tablet    Take 1 tablet (5 mg) by mouth 2 times daily       ASPIRIN NOT PRESCRIBED    INTENTIONAL    1 each    Please choose reason not prescribed, below       atorvastatin 40 MG tablet    LIPITOR    90 tablet    Take 1 tablet (40 mg) by mouth daily       B-12 1000 MCG Tbcr     100 tablet    Take 1,000 mcg by mouth daily       blood glucose monitoring meter device kit    no brand specified    1 kit    1 Device daily       cholecalciferol 1000 UNIT tablet    vitamin D    100 tablet    Take 1 tablet (1,000 Units) by mouth daily       COMPRESSION STOCKINGS     2 each    Please wear compression stockings in the affected leg (right) or both legs most time during the day and take them off at night.       DAILY MULTIVITAMIN PO      Take  by mouth.       lisinopril 10 MG tablet    PRINIVIL/ZESTRIL    90 tablet    Take 1 tablet (10 mg) by mouth daily       LORATADINE TABS 10 MG (RAPID) OR     90    1 tab po QD       metFORMIN 500 MG 24 hr tablet    GLUCOPHAGE-XR    360 tablet    Take 2 tablets (1,000 mg) by mouth 2 times daily (with meals)       minocycline 100 MG capsule    MINOCIN/DYNACIN    90 capsule    Take 1 capsule (100 mg) by mouth daily Take with full glass of water       * order for DME     4 each    Equipment being ordered: Bilateral knee high compression stockings 15-20 mmHg; he'd prefer to try open-toe       * order for DME     1 Device    Equipment being ordered: compression hose   15-20mmhg Bilateral knee high       * Notice:  This list has 2 medication(s) that are  the same as other medications prescribed for you. Read the directions carefully, and ask your doctor or other care provider to review them with you.

## 2017-03-28 NOTE — PROGRESS NOTES
"    [facility]  [address] [place] [address] [address], [address] [address]  Phone: [phone], Fax: [phone].    Patient seen at: [facility]    Outpatient Visit Note:    Patient: Peterson Vazquez  MRN: [id number]  : 1960  DILIP: 2017    Reason:  History of distal right lower extremity DVT back in 2016. Currently on anticoagulation therapy. Here for consultation.     HPI:  This is a 56 year old male with a history of obesity, Type 2 diabetes, and hypertension who was found to have an isolated right peroneal vein thrombosis back in 2016 while also co-currently diagnosed with right leg cellulitis at the same time, referred by his primary care provider, Dr. Yazmin Pradhan for consultation in regard to ultimate duration of anticoagulation therapy.     Dated back to 2016, the patient seek emergency department attention with left sided flank pain. CT at the time confirmed a ureteral stone with mild hydronephrosis. He was treated conservatively and was discharged home the same day.     Then back on 2016, he presented to the emergency department with 2 days history of right foot pain, redness and swelling. He also reported a \"severe fever\" 2 days prior to his emergency department visit at the time. He was diagnosed with right leg Cellulitis and an ultrasound confirmed a isolated right lower extremity peroneal vein thrombosis at the time. He was then admitted for IV antibiotic treatment as well as initiating anticoagulation therapy. At the time the decision was made to have him be on Eliquis at 5 mg PO BID dosing. He recovered well for the cellulitis and has since maintained on Eliquis as the patient apparently did not know that he needed follow up evaluation in regard to his DVT.     Peterson has run out of his Eliquis prescription a few days ago and hence has not been taking Eliquis for the past several days. "     Past Medical History:  Past Medical History:   Diagnosis Date     Chronic rhinitis      DVT (deep venous thrombosis) (H)     R peroneal vein in 2016     History of depression 2003     HTN (hypertension) 4/30/2013     Morbid obesity (H)      Other and unspecified hyperlipidemia      Type II or unspecified type diabetes mellitus without mention of complication, not stated as uncontrolled 7-05       Past Surgical History:  Past Surgical History:   Procedure Laterality Date     HC TOOTH EXTRACTION W/FORCEP      WISDOM TEETH     TONSILLECTOMY      TONSILS       Medications:  Current Outpatient Prescriptions   Medication Sig Dispense Refill     COMPRESSION STOCKINGS Please wear compression stockings in the affected leg (right) or both legs most time during the day and take them off at night. 2 each 2     ASPIRIN NOT PRESCRIBED (INTENTIONAL) Please choose reason not prescribed, below 1 each 0     atorvastatin (LIPITOR) 40 MG tablet Take 1 tablet (40 mg) by mouth daily 90 tablet 3     metFORMIN (GLUCOPHAGE-XR) 500 MG 24 hr tablet Take 2 tablets (1,000 mg) by mouth 2 times daily (with meals) 360 tablet 3     lisinopril (PRINIVIL/ZESTRIL) 10 MG tablet Take 1 tablet (10 mg) by mouth daily 90 tablet 3     minocycline (MINOCIN/DYNACIN) 100 MG capsule Take 1 capsule (100 mg) by mouth daily Take with full glass of water 90 capsule 3     order for DME Equipment being ordered: compression hose   15-20mmhg  Bilateral knee high 1 Device 0     order for DME Equipment being ordered: Bilateral knee high compression stockings 15-20 mmHg; he'd prefer to try open-toe 4 each 1     Cyanocobalamin (B-12) 1000 MCG TBCR Take 1,000 mcg by mouth daily 100 tablet 1     cholecalciferol (VITAMIN D) 1000 UNIT tablet Take 1 tablet (1,000 Units) by mouth daily 100 tablet 3     Blood Glucose Monitoring Suppl (BLOOD GLUCOSE METER) KIT 1 Device daily 1 kit 2     Multiple Vitamin (DAILY MULTIVITAMIN PO) Take  by mouth.       LORATADINE TABS 10 MG  (RAPID) OR 1 tab po QD 90 1     apixaban ANTICOAGULANT (ELIQUIS) 5 MG tablet Take 1 tablet (5 mg) by mouth 2 times daily (Patient not taking: Reported on 3/28/2017) 60 tablet 0        Allergies:  Allergies   Allergen Reactions     Fluoxetine Hcl      Prozac: anxiety       ROS:  Denies any bleeding issues. No gum bleeding, No nose bleed. Denies any hematuria or blood in stools. Denies any ecchymosis. Denies any fever, no chest pain. Denies any shortness of breath. Since his right leg DVT, he continues to experience some right leg pain and swelling. He has been mostly using diabetes stockings for his lower extremities and wears his graduated compression stockings about twice a week.     Social History:  Denies any tobacco use. No significant alcohol use. Denies any illicit drug use.     Family History:  Mother with lung and brain cancer.  Father with Cardiovascular and Alzheimer Disease.  Brother with cardiovascular (MI)  No family history of DVT/PE.     Objectives:  Pleasant 56 year old male in no acute distress.  Vitals: B/P: 144/80, T: 97.8, P: 86, R: 18, Wt: 333 lbs 9.6 oz  Exam:   Complete exam is not performed today.     Labs:  Component      Latest Ref Rng & Units 2016   Cardiolipin IgG April      0 - 15.0 GPL <15.0 . . .   Cardiolipin IgM April      0 - 12.5 MPL <12.5 . . .   Beta 2 Glycoprotein 1 Antibody IgG      <7 U/mL 1.0   Beta 2 Glycoprotein 1 Antibody IgM      <7 U/mL <0.9 . . .     Imagin2016 CT Abdomen Pelvis:  1. There is a 0.4 cm distal left ureteral stone causing mild hydronephrosis.  2. Single tiny left intrarenal stone. 3. Urinary bladder stones. 4. Cholelithiasis. 5. Fatty infiltration of the liver. 6. Sigmoid diverticula without acute diverticulitis.    2016 Ultrasound bilateral lower extremities:  Acute deep vein thrombosis isolated to the peroneal vein of the calf.     Assessment:  In summary, Peterson is a 56 year old male who has a history of Type 2 DM, hypertension, and  rai who developed an episode of cellulitis of the right leg with co-current isolated right peroneal vein thrombosis back in June 2016, currently on anticoagulation therapy with Eliquis, referred to our clinic for consultation in regard to ultimate duration of anticoagulation therapy. Peterson actually has stop taking his Eliquis a few days ago as he ran out of his prescription. While he was on Eliquis, he tolerated the medication well without any bleeding complications or any recurrent thromboembolic events.     His distal right leg DVT was considered a provoked event as he was battling cellulitis as well at the time.     Diagnosis:  1. Provoked (Cellulitis) Right distal lower extremity DVT back in June, 2016.     Plan:  I explain to Peterson that since his DVT of the right leg back in June, 2016 was a provoked event and that it was only confined to the distal portion of his right leg, current CHEST guidelines would recommend 3 months of anticoagulation therapy. This patient has already completed 9 months of anticoagulation therapy and hence no further indications for anticoagulation therapy at this time. He has already discontinue his Eliquis a few days ago. Hence no need to restart.     I do, however, recommend that he gets a repeat ultrasound of the right leg to evaluate for residual thrombus and set as a new baseline for future reference. I will order this today and he can get it done at his convenient.     The patient is given our center's contact information and is instructed to call if he should have any further questions or concerns.  Otherwise, at this time, I have no plans to see him back on a routine basis.     Thank you for letting us to participate in this patient's care.     Eneida Cardona, nurse clinician is present throughout the entire clinic visit with the patient today.  Patient understands and agrees with the above plan and recommendation.    Total Time Spent:  35 minutes, all 35 minutes was spent  on face-to-face consultation of the patient and coordination of care in regard to his history of DVT and anticoagulation therapy recommendation.    Time IN: 09:16  Time OUT: 09:51      Brett Chowdhury PA-C, MPAS  Physician Assistant  [facility].     CC:  Yazmin Pradhan OD, primary care provider.

## 2017-03-28 NOTE — NURSING NOTE
Peterson Vazquez  MRN: [id number]  Male, 56 year old, 1960    Teaching Flowsheet   Relevant Diagnosis: RLE DVT 6/12/16 (calf vein in setting of cellulitis); just ran out of Eliquis    Teaching Topic: Signs & symptoms of DVT/ PE, high risk times for venous clots, Post-thrombotic syndrome, compression stockings    Plan:  Stop Eliquis; call with any high risk time for venous clots to consider temporary anticoagulation.  US of right leg ([place]) after April 15th-Friday am     Person(s) involved in teaching:   Patient     Motivation Level: good  Asks Questions: Yes      Eager to Learn: Yes  Cooperative: Yes    Receptive (willing/able to accept information): Yes     Patient demonstrates understanding of the following:  Reason for the appointment, diagnosis and treatment plan: Yes  Knowledge of proper use of medications and conditions for which they are ordered (with special attention to potential side effects or drug interactions): Yes  Which situations necessitate calling provider and whom to contact: Yes      Nutritional needs and diet plan: NA  Pain management techniques: NA  Wound Care: NA  How and/when to access community resources: NA    Educated patient about the signs, symptoms of and risk factors for venous thrombosis (VTE) and provided the National Blood Clot [place] book taina, which reviews these facts.    Patient educated about the signs, symptoms and treatment for post thrombotic syndrome.     Prescription given for compression stockings with 20-30 mm Hg level of compression.     Patient verbalized understanding of the above information.  They deny further questions at this time.    Patient given the contact card for the [facility] and has the appropriate numbers to call with any questions.    Amna Cardona, RN, MSN -Nurse Clinician, [facility]

## 2017-03-28 NOTE — PROGRESS NOTES
"Peterson Vazquez is a 56 year old male who presents for:  Chief Complaint   Patient presents with     Hematology     DVT        Initial Vitals:  /80 (BP Location: Right arm, Patient Position: Chair, Cuff Size: Adult Large)  Pulse 86  Temp 97.8  F (36.6  C) (Oral)  Resp 18  Wt (!) 151.3 kg (333 lb 9.6 oz)  SpO2 95%  BMI 42.83 kg/m2 Estimated body mass index is 42.83 kg/(m^2) as calculated from the following:    Height as of 3/3/17: 1.88 m (6' 2\").    Weight as of this encounter: 151.3 kg (333 lb 9.6 oz).. Body surface area is 2.81 meters squared. BP completed using cuff size: large  No Pain (0) No LMP for male patient. Allergies and medications reviewed.     Medications: Medication refills not needed today.  Pharmacy name entered into [facility]:    [id number] - [place], [place] - [address] [place] [address] AT [address] & [facility]  [facility] - [place], [place] - [address] [place] [address] PHARMACY [place], [place] - [address] [place] [address].  [id number] - [place], [place] - [address] [place] [address] AVE AT [address] [place] [address] & [facility]AC[facility] [place], [place] - [address]    Comments: New patient DVT    5 minutes for nursing intake (face to face time)   Michelle Conteh MA      "

## 2017-04-21 ENCOUNTER — HOSPITAL ENCOUNTER (OUTPATIENT)
Dept: ULTRASOUND IMAGING | Facility: CLINIC | Age: 57
Discharge: HOME | End: 2017-04-21
Attending: PHYSICIAN ASSISTANT | Admitting: PHYSICIAN ASSISTANT
Payer: COMMERCIAL

## 2017-04-21 DIAGNOSIS — Z86.718 HISTORY OF DEEP VENOUS THROMBOSIS: ICD-10-CM

## 2017-04-21 PROCEDURE — 93971 EXTREMITY STUDY: CPT | Mod: RT

## 2017-04-27 ENCOUNTER — TELEPHONE (OUTPATIENT)
Dept: HEMATOLOGY | Facility: CLINIC | Age: 57
End: 2017-04-27

## 2017-04-27 NOTE — TELEPHONE ENCOUNTER
[facility]  [address] [place] [address] [address], [address], [place] [address]  Phone: [phone], Fax: [phone].    Telephone Note:    Patient: Peterson Vazquez  MRN: [id number]  : 1960  DILIP: 2017  Time: 14:48    Called patient with result of his Ultrasound (right leg) done on 2017:  Examination of the deep veins with graded compression and  color flow Doppler with spectral wave form analysis was performed.   Previously seen thrombus in one of the right peroneal veins in the  calf appears to have resolved.    Patient has no further questions. This ultrasound will act as a new baseline for future references.       Brett Chowdhury PA-C, MPAS  Physician Assistant  [facility].

## 2017-06-21 ENCOUNTER — HOSPITAL ENCOUNTER (EMERGENCY)
Facility: CLINIC | Age: 57
Discharge: HOME | End: 2017-06-21
Attending: EMERGENCY MEDICINE | Admitting: EMERGENCY MEDICINE
Payer: COMMERCIAL

## 2017-06-21 ENCOUNTER — APPOINTMENT (OUTPATIENT)
Dept: CT IMAGING | Facility: CLINIC | Age: 57
End: 2017-06-21
Attending: EMERGENCY MEDICINE
Payer: COMMERCIAL

## 2017-06-21 VITALS
WEIGHT: 310 LBS | TEMPERATURE: 98.6 F | SYSTOLIC BLOOD PRESSURE: 156 MMHG | RESPIRATION RATE: 20 BRPM | OXYGEN SATURATION: 98 % | DIASTOLIC BLOOD PRESSURE: 86 MMHG | BODY MASS INDEX: 39.8 KG/M2

## 2017-06-21 DIAGNOSIS — I10 ESSENTIAL HYPERTENSION: ICD-10-CM

## 2017-06-21 DIAGNOSIS — N20.1 URETEROLITHIASIS: ICD-10-CM

## 2017-06-21 DIAGNOSIS — N23 RENAL COLIC: ICD-10-CM

## 2017-06-21 LAB
ALBUMIN UR-MCNC: 30 MG/DL
ANION GAP SERPL CALCULATED.3IONS-SCNC: 9 MMOL/L (ref 3–14)
APPEARANCE UR: ABNORMAL
BASOPHILS # BLD AUTO: 0 10E9/L (ref 0–0.2)
BASOPHILS NFR BLD AUTO: 0.3 %
BILIRUB UR QL STRIP: NEGATIVE
BUN SERPL-MCNC: 19 MG/DL (ref 7–30)
CALCIUM SERPL-MCNC: 9.3 MG/DL (ref 8.5–10.1)
CHLORIDE SERPL-SCNC: 104 MMOL/L (ref 94–109)
CO2 SERPL-SCNC: 23 MMOL/L (ref 20–32)
COLOR UR AUTO: YELLOW
CREAT SERPL-MCNC: 0.99 MG/DL (ref 0.66–1.25)
DIFFERENTIAL METHOD BLD: ABNORMAL
EOSINOPHIL # BLD AUTO: 0.1 10E9/L (ref 0–0.7)
EOSINOPHIL NFR BLD AUTO: 1.6 %
ERYTHROCYTE [DISTWIDTH] IN BLOOD BY AUTOMATED COUNT: 12.5 % (ref 10–15)
GFR SERPL CREATININE-BSD FRML MDRD: 77 ML/MIN/1.7M2
GLUCOSE SERPL-MCNC: 225 MG/DL (ref 70–99)
GLUCOSE UR STRIP-MCNC: 30 MG/DL
HCT VFR BLD AUTO: 40.3 % (ref 40–53)
HGB BLD-MCNC: 14 G/DL (ref 13.3–17.7)
HGB UR QL STRIP: ABNORMAL
IMM GRANULOCYTES # BLD: 0 10E9/L (ref 0–0.4)
IMM GRANULOCYTES NFR BLD: 0.3 %
KETONES UR STRIP-MCNC: NEGATIVE MG/DL
LEUKOCYTE ESTERASE UR QL STRIP: NEGATIVE
LYMPHOCYTES # BLD AUTO: 1.4 10E9/L (ref 0.8–5.3)
LYMPHOCYTES NFR BLD AUTO: 18.6 %
MCH RBC QN AUTO: 32 PG (ref 26.5–33)
MCHC RBC AUTO-ENTMCNC: 34.7 G/DL (ref 31.5–36.5)
MCV RBC AUTO: 92 FL (ref 78–100)
MONOCYTES # BLD AUTO: 0.5 10E9/L (ref 0–1.3)
MONOCYTES NFR BLD AUTO: 6.3 %
MUCOUS THREADS #/AREA URNS LPF: PRESENT /LPF
NEUTROPHILS # BLD AUTO: 5.6 10E9/L (ref 1.6–8.3)
NEUTROPHILS NFR BLD AUTO: 72.9 %
NITRATE UR QL: NEGATIVE
NRBC # BLD AUTO: 0 10*3/UL
NRBC BLD AUTO-RTO: 0 /100
PH UR STRIP: 5 PH (ref 5–7)
PLATELET # BLD AUTO: 204 10E9/L (ref 150–450)
POTASSIUM SERPL-SCNC: 4.2 MMOL/L (ref 3.4–5.3)
RBC # BLD AUTO: 4.37 10E12/L (ref 4.4–5.9)
RBC #/AREA URNS AUTO: >182 /HPF (ref 0–2)
SODIUM SERPL-SCNC: 136 MMOL/L (ref 133–144)
SP GR UR STRIP: 1.01 (ref 1–1.03)
URN SPEC COLLECT METH UR: ABNORMAL
UROBILINOGEN UR STRIP-MCNC: NORMAL MG/DL (ref 0–2)
WBC # BLD AUTO: 7.6 10E9/L (ref 4–11)
WBC #/AREA URNS AUTO: 4 /HPF (ref 0–2)

## 2017-06-21 PROCEDURE — 85025 COMPLETE CBC W/AUTO DIFF WBC: CPT | Performed by: EMERGENCY MEDICINE

## 2017-06-21 PROCEDURE — 96361 HYDRATE IV INFUSION ADD-ON: CPT

## 2017-06-21 PROCEDURE — 81001 URINALYSIS AUTO W/SCOPE: CPT | Performed by: EMERGENCY MEDICINE

## 2017-06-21 PROCEDURE — 74176 CT ABD & PELVIS W/O CONTRAST: CPT

## 2017-06-21 PROCEDURE — 96375 TX/PRO/DX INJ NEW DRUG ADDON: CPT

## 2017-06-21 PROCEDURE — 25000128 H RX IP 250 OP 636: Performed by: EMERGENCY MEDICINE

## 2017-06-21 PROCEDURE — 80048 BASIC METABOLIC PNL TOTAL CA: CPT | Performed by: EMERGENCY MEDICINE

## 2017-06-21 PROCEDURE — 96374 THER/PROPH/DIAG INJ IV PUSH: CPT

## 2017-06-21 PROCEDURE — 25000132 ZZH RX MED GY IP 250 OP 250 PS 637: Performed by: EMERGENCY MEDICINE

## 2017-06-21 PROCEDURE — 99285 EMERGENCY DEPT VISIT HI MDM: CPT | Mod: 25

## 2017-06-21 RX ORDER — MORPHINE SULFATE 4 MG/ML
4 INJECTION, SOLUTION INTRAMUSCULAR; INTRAVENOUS
Status: DISCONTINUED | OUTPATIENT
Start: 2017-06-21 | End: 2017-06-21 | Stop reason: HOSPADM

## 2017-06-21 RX ORDER — SODIUM CHLORIDE 9 MG/ML
1000 INJECTION, SOLUTION INTRAVENOUS CONTINUOUS
Status: DISCONTINUED | OUTPATIENT
Start: 2017-06-21 | End: 2017-06-21 | Stop reason: HOSPADM

## 2017-06-21 RX ORDER — TAMSULOSIN HYDROCHLORIDE 0.4 MG/1
0.4 CAPSULE ORAL ONCE
Status: COMPLETED | OUTPATIENT
Start: 2017-06-21 | End: 2017-06-21

## 2017-06-21 RX ORDER — ONDANSETRON 4 MG/1
4 TABLET, ORALLY DISINTEGRATING ORAL EVERY 6 HOURS PRN
Qty: 20 TABLET | Refills: 0 | Status: SHIPPED | OUTPATIENT
Start: 2017-06-21 | End: 2017-06-24

## 2017-06-21 RX ORDER — OXYCODONE HYDROCHLORIDE 5 MG/1
5 TABLET ORAL ONCE
Status: COMPLETED | OUTPATIENT
Start: 2017-06-21 | End: 2017-06-21

## 2017-06-21 RX ORDER — ONDANSETRON 2 MG/ML
4 INJECTION INTRAMUSCULAR; INTRAVENOUS ONCE
Status: DISCONTINUED | OUTPATIENT
Start: 2017-06-21 | End: 2017-06-21 | Stop reason: HOSPADM

## 2017-06-21 RX ORDER — TAMSULOSIN HYDROCHLORIDE 0.4 MG/1
0.4 CAPSULE ORAL DAILY
Qty: 10 CAPSULE | Refills: 0 | Status: SHIPPED | OUTPATIENT
Start: 2017-06-21 | End: 2017-07-01

## 2017-06-21 RX ORDER — ONDANSETRON 2 MG/ML
INJECTION INTRAMUSCULAR; INTRAVENOUS
Status: DISCONTINUED
Start: 2017-06-21 | End: 2017-06-21 | Stop reason: HOSPADM

## 2017-06-21 RX ORDER — ONDANSETRON 2 MG/ML
4 INJECTION INTRAMUSCULAR; INTRAVENOUS
Status: COMPLETED | OUTPATIENT
Start: 2017-06-21 | End: 2017-06-21

## 2017-06-21 RX ORDER — OXYCODONE HYDROCHLORIDE 5 MG/1
5 TABLET ORAL EVERY 6 HOURS PRN
Qty: 20 TABLET | Refills: 0 | Status: SHIPPED | OUTPATIENT
Start: 2017-06-21 | End: 2018-04-27

## 2017-06-21 RX ORDER — KETOROLAC TROMETHAMINE 30 MG/ML
30 INJECTION, SOLUTION INTRAMUSCULAR; INTRAVENOUS ONCE
Status: COMPLETED | OUTPATIENT
Start: 2017-06-21 | End: 2017-06-21

## 2017-06-21 RX ADMIN — SODIUM CHLORIDE 1000 ML: 9 INJECTION, SOLUTION INTRAVENOUS at 11:52

## 2017-06-21 RX ADMIN — MORPHINE SULFATE 4 MG: 4 INJECTION, SOLUTION INTRAMUSCULAR; INTRAVENOUS at 11:50

## 2017-06-21 RX ADMIN — KETOROLAC TROMETHAMINE 30 MG: 30 INJECTION, SOLUTION INTRAMUSCULAR at 13:25

## 2017-06-21 RX ADMIN — OXYCODONE HYDROCHLORIDE 5 MG: 5 TABLET ORAL at 13:33

## 2017-06-21 RX ADMIN — TAMSULOSIN HYDROCHLORIDE 0.4 MG: 0.4 CAPSULE ORAL at 13:25

## 2017-06-21 RX ADMIN — ONDANSETRON 4 MG: 2 SOLUTION INTRAMUSCULAR; INTRAVENOUS at 11:50

## 2017-06-21 ASSESSMENT — ENCOUNTER SYMPTOMS
FEVER: 0
NAUSEA: 1
VOMITING: 0
FLANK PAIN: 1
ABDOMINAL PAIN: 1
HEMATURIA: 0

## 2017-06-21 NOTE — ED PROVIDER NOTES
History     Chief Complaint:  Flank Pain    HPI   Peterson Vazquez is a 56 year old male with a history of kidney stone who presents to the emergency department today for evaluation of flank pain. The patient complains of left sided flank pain that radiates to his abdomen and scrotum area. Pain began suddenly today. He also has associated diaphoresis and nausea but no vomiting or hematuria. Patient has history of kidney stone about a year ago. Symptoms today are consistent with his previous episode of kidney stone which he passed on his own. Of note, the patient has not used Eliquis since last year.     Allergies:  Fluoxetine Hcl    Medications:    COMPRESSION STOCKINGS  atorvastatin (LIPITOR) 40 MG tablet  metFORMIN (GLUCOPHAGE-XR) 500 MG 24 hr tablet  lisinopril (PRINIVIL/ZESTRIL) 10 MG tablet  minocycline (MINOCIN/DYNACIN) 100 MG capsule  Cyanocobalamin (B-12) 1000 MCG TBCR  Blood Glucose Monitoring Suppl (BLOOD GLUCOSE METER) KIT  LORATADINE TABS 10 MG (RAPID) OR     Past Medical History:    Chronic rhinitis   DVT (deep venous thrombosis) (H)   History of depression   HTN (hypertension)   Morbid obesity (H)   Other and unspecified hyperlipidemia   Type II or unspecified type diabetes mellitus without mention of complication, not stated as uncontrolled    Past Surgical History:    HC TOOTH EXTRACTION W/FORCEP- WISDOM TEETH  TONSILLECTOMY     Family History:    Mother: Cancer  Father: CABG, Alzheimer's disease  Brother: MI    Social History:  The patient was accompanied to the ED by wife, Carlo.  Smoking Status: Never smoker  Smokeless Tobacco: Never used  Alcohol Use: No  Marital Status:   [2]     Review of Systems   Constitutional: Negative for fever.   Gastrointestinal: Positive for abdominal pain and nausea. Negative for vomiting.   Genitourinary: Positive for flank pain. Negative for hematuria.   All other systems reviewed and are negative.    Physical Exam   Vitals:  Patient Vitals for the past 24  hrs:   BP Temp Temp src Heart Rate Resp SpO2 Weight   17 1130 (!) 196/102 98.6  F (37  C) Oral 74 18 95 % (!) 140.6 kg (310 lb)     Physical Exam  General: Alert, interactive in mild distress  Head:  Scalp is atraumatic  Eyes:  The pupils are equal, round, and reactive to light    EOM's intact    No scleral icterus  ENT:      Nose:  The external nose is normal  Ears:  External ears are normal  Mouth/Throat: The oropharynx is normal    Mucus membranes are moist      Neck:  Normal range of motion.      There is no rigidity.    Trachea is in the midline         CV:  Regular rate and rhythm    No murmur   Resp:  Breath sounds are clear bilaterally    Non-labored, no retractions or accessory muscle use      GI:  Abdomen is soft, no distension, LLQ tenderness and left CVA tenderness.       MS:  Normal strength in all 4 extremities.  Skin:  Warm and dry, No rash or lesions noted.  Neuro: Strength 5/5 x4.  Sensation intact  In all 4 extremities.        Psych:  Awake. Alert.  Normal affect.      Appropriate interactions.      Emergency Department Course   Imaging:  Radiology findings were communicated with the patient who voiced understanding of the findings.    CT Abdomen Pelvis w/o Contrast:  1. 0.4 cm stone within the left posterior bladder lumen just in the  region of the left ureterovesical junction. Associated mild left  hydronephrosis. This stone could be the stone described on the older  exam from 2016 but has moved more distally.  2. Additional stable intrarenal stone at the left kidney. Multiple  bladder stones again noted layering posteriorly.  3. Fatty liver.  4. No other acute abnormality.  Reading per radiology      Laboratory:  Laboratory findings were communicated with the patient who voiced understanding of the findings.    CBC: AWNL. (WBC 7.6, HGB 14.0, )     basic metabolic panel: Glucose: 225(H)    UA with Microscopic: Urine g, Urine bloo: Large, Protein Albumin urine: 30, WBC:  4(H), RBC: >182(H), Mucous urine: Present     Interventions:  1152- NS 1000 mL IV  1150- Morphine 4 mg IV   1150- Zofran 4 mg IV   Toradol 30 mg IV  Oxycodone 5 mg PO     Emergency Department Course:  Nursing notes and vitals reviewed.  I performed an exam of the patient as documented above.     IV was inserted and blood was drawn for laboratory testing, results above.    The patient was sent for a CT while in the emergency department, results above.     I discussed the treatment plan with the patient. They expressed understanding of this plan and consented to discharge.    I personally reviewed the laboratory results with the Patient and answered all related questions prior to discharge.    Impression & Plan      Medical Decision Making:  The patient presented with unilateral flank and abdominal pain consistent with renal colic. CT confirms a ureteral stone. Pain is controlled with interventions in the Emergency Department. There is no fever or evidence of a urinary tract infection. The patient will be discharged with opioid analgesics and Ibuprofen for pain. Flomax will be prescribed daily to attempt to ease stone passage.   Zofran was prescribed for nausea.  I considered other etiologies for these symptoms including AAA and pyelonephritis but these are unlikely given the otherwise normal CT scan and urinalysis.  The patient is instructed to return if increasing pain not controlled with pain meds, vomiting, and fever. Strain urine to look for stone, if detected, submit to primary doctor for lab analysis. Instructions were given to follow up with urology within one week, sooner if pain continues, as retrieval of the stone may be required for refractory symptoms.        Diagnosis:    ICD-10-CM    1. Renal colic N23    2. Ureterolithiasis N20.1    3. Essential hypertension I10          Disposition:   The patient was discharged.    Discharge Medications:  New Prescriptions    ONDANSETRON (ZOFRAN ODT) 4 MG ODT TAB     Take 1 tablet (4 mg) by mouth every 6 hours as needed    OXYCODONE (ROXICODONE) 5 MG IR TABLET    Take 1 tablet (5 mg) by mouth every 6 hours as needed for pain    TAMSULOSIN (FLOMAX) 0.4 MG CAPSULE    Take 1 capsule (0.4 mg) by mouth daily for 10 doses       Scribe Disclosure:  Sunitha SALDANA, am serving as a scribe at 11:24 AM on 6/21/2017 to document services personally performed by Alberto Boyd MD, based on my observations and the provider's statements to me.    6/21/2017    EMERGENCY DEPARTMENT       Albetro Boyd MD  06/21/17 [id number]

## 2017-06-21 NOTE — ED AVS SNAPSHOT
Emergency Department    [address] [place] [address]    Phone:  [phone]    Fax:  [phone]                                       Peterson Vazquez   MRN: [id number]    Department:   Emergency Department   Date of Visit:  6/21/2017           Patient Information     Date Of Birth          1960        Your diagnoses for this visit were:     Renal colic     Ureterolithiasis     Essential hypertension        You were seen by Alberto Boyd MD.      Follow-up Information     Follow up with Yazmin Pradhan DO.    Specialty:  Internal Medicine    Why:  As needed, If symptoms worsen    Contact information:    [facility]  [address]  [facility] [id number] [phone]          Follow up with Peterson Perez MD In 3 days.    Specialty:  Urology    Contact information:    [facility]  [address]  [facility] [address] [phone]          Follow up with  Emergency Department.    Specialty:  EMERGENCY MEDICINE    Why:  As needed, If symptoms worsen    Contact information:    [address] [id number] [phone]        Discharge Instructions         Discharge Instructions  Kidney Stones    Kidney stones are a common problem that can cause a lot of pain but fortunately are usually not dangerous and can be generally treated with medicine at home.  However, sometimes your condition may be worse than it seemed at first, or may get worse with time.     You need to follow-up with your regular doctor within 3 days.    Most kidney stones will pass on their own, but occasionally stones may need to be removed by an urologist. We will send you home with a urine strainer. Be sure to urinate into this, or urinate into a container and pour the urine through the fine filter to catch the kidney stone as it comes out. The stone will seem like a pebble or grain of sand. Be sure to save this in a Ziploc  bag and take it to  the doctor s office with you.       Return to the Emergency Department if:    Your pain is not controlled.    You are vomiting and can t keep fluids or medications down.    You develop fever (>101).    You feel much more ill or develop new symptoms.  What can I do to help myself?    Be sure to drink plenty of fluids.    Staying active is good, and may help the stone to pass. You may do whatever you feel up to doing without restrictions.   Treatment:    Non-steroidal anti-inflammatory drugs (NSAIDs). This includes prescription medicines like Toradol  (ketorolac) and non-prescription medicines like Advil  (ibuprofen) and Nuprin  (ibuprofen). These pain relievers are very effective for kidney stones.    Narcotic pain pills. If you have been given a narcotic such as Vicodin  (hydrocodone with acetaminophen), Percocet  (oxycodone with acetaminophen), or codeine, do not drive for four hours after you have taken it. If the narcotic contains Tylenol  (acetaminophen), do not take Tylenol  with it. All narcotics will cause constipation, so eat a high fiber diet.      Nausea medication.  Nausea and vomiting are common with kidney stones, so your physician may send you home with medicine for this.     Flomax  (tamsulosin). This medicine is sometimes used for men with prostate problems, but also can help kidney stones to pass. This medicine can lower blood pressure, and you may feel faint, especially when you first stand up. Be sure to get up gradually, sit down if you feel faint, and avoid activity where feeling faint would be dangerous, such as climbing ladders.   If you were given a prescription for medicine here today, be sure to read all of the information (including the package insert) that comes with your prescription.  This will include important information about the medicine, its side effects, and any warnings that you need to know about.  The pharmacist who fills the prescription can provide more information and  answer questions you may have about the medicine.  If you have questions or concerns that the pharmacist cannot address, please call or return to the Emergency Department.   Opioid Medication Information    Pain medications are among the most commonly prescribed medicines, so we are including this information for all our patients. If you did not receive pain medication or get a prescription for pain medicine, you can ignore it.     You may have been given a prescription for an opioid (narcotic) pain medicine and/or have received a pain medicine while here in the Emergency Department. These medicines can make you drowsy or impaired. You must not drive, operate dangerous equipment, or engage in any other dangerous activities while taking these medications. If you drive while taking these medications, you could be arrested for DUI, or driving under the influence. Do not drink any alcohol while you are taking these medications.     Opioid pain medications can cause addiction. If you have a history of chemical dependency of any type, you are at a higher risk of becoming addicted to pain medications.  Only take these prescribed medications to treat your pain when all other options have been tried. Take it for as short a time and as few doses as possible. Store your pain pills in a secure place, as they are frequently stolen and provide a dangerous opportunity for children or visitors in your house to start abusing these powerful medications. We will not replace any lost or stolen medicine.  As soon as your pain is better, you should flush all your remaining medication.     Many prescription pain medications contain Tylenol  (acetaminophen), including Vicodin , Tylenol #3 , Norco , Lortab , and Percocet .  You should not take any extra pills of Tylenol  if you are using these prescription medications or you can get very sick.  Do not ever take more than 3000 mg of acetaminophen in any 24 hour period.    All opioids tend to  cause constipation. Drink plenty of water and eat foods that have a lot of fiber, such as fruits, vegetables, prune juice, apple juice and high fiber cereal.  Take a laxative if you don t move your bowels at least every other day. Miralax , Milk of Magnesia, Colace , or Senna  can be used to keep you regular.      Remember that you can always come back to the Emergency Department if you are not able to see your regular doctor in the amount of time listed above, if you get any new symptoms, or if there is anything that worries you.      Future Appointments        Provider Department Dept Phone Center    6/23/2017 8:00 AM [facility] [phone]       24 Hour Appointment Hotline       To make an appointment at any [facility], call [phone] ([phone]). If you don't have a family doctor or clinic, we will help you find one. [facility] are conveniently located to serve the needs of you and your family.             Review of your medicines      START taking        Dose / Directions Last dose taken    ondansetron 4 MG ODT tab   Commonly known as:  ZOFRAN ODT   Dose:  4 mg   Quantity:  20 tablet        Take 1 tablet (4 mg) by mouth every 6 hours as needed   Refills:  0        oxyCODONE 5 MG IR tablet   Commonly known as:  ROXICODONE   Dose:  5 mg   Quantity:  20 tablet        Take 1 tablet (5 mg) by mouth every 6 hours as needed for pain   Refills:  0        tamsulosin 0.4 MG capsule   Commonly known as:  FLOMAX   Dose:  0.4 mg   Quantity:  10 capsule        Take 1 capsule (0.4 mg) by mouth daily for 10 doses   Refills:  0          Our records show that you are taking the medicines listed below. If these are incorrect, please call your family doctor or clinic.        Dose / Directions Last dose taken    ASPIRIN NOT PRESCRIBED   Commonly known as:  INTENTIONAL   Quantity:  1 each        Please choose reason not prescribed, below   Refills:  0        atorvastatin  40 MG tablet   Commonly known as:  LIPITOR   Dose:  40 mg   Quantity:  90 tablet        Take 1 tablet (40 mg) by mouth daily   Refills:  3        B-12 1000 MCG Tbcr   Dose:  1000 mcg   Quantity:  100 tablet        Take 1,000 mcg by mouth daily   Refills:  1        blood glucose monitoring meter device kit   Commonly known as:  no brand specified   Dose:  1 Device   Quantity:  1 kit        1 Device daily   Refills:  2        cholecalciferol 1000 UNIT tablet   Commonly known as:  vitamin D   Dose:  1000 Units   Quantity:  100 tablet        Take 1 tablet (1,000 Units) by mouth daily   Refills:  3        COMPRESSION STOCKINGS   Quantity:  2 each        Please wear compression stockings in the affected leg (right) or both legs most time during the day and take them off at night.   Refills:  2        DAILY MULTIVITAMIN PO        Take  by mouth.   Refills:  0        lisinopril 10 MG tablet   Commonly known as:  PRINIVIL/ZESTRIL   Dose:  10 mg   Quantity:  90 tablet        Take 1 tablet (10 mg) by mouth daily   Refills:  3        LORATADINE TABS 10 MG (RAPID) OR   Quantity:  90        1 tab po QD   Refills:  1        metFORMIN 500 MG 24 hr tablet   Commonly known as:  GLUCOPHAGE-XR   Dose:  1000 mg   Quantity:  360 tablet        Take 2 tablets (1,000 mg) by mouth 2 times daily (with meals)   Refills:  3        minocycline 100 MG capsule   Commonly known as:  MINOCIN/DYNACIN   Dose:  100 mg   Quantity:  90 capsule        Take 1 capsule (100 mg) by mouth daily Take with full glass of water   Refills:  3        * order for DME   Quantity:  4 each        Equipment being ordered: Bilateral knee high compression stockings 15-20 mmHg; he'd prefer to try open-toe   Refills:  1        * order for DME   Quantity:  1 Device        Equipment being ordered: compression hose   15-20mmhg Bilateral knee high   Refills:  0        * Notice:  This list has 2 medication(s) that are the same as other medications prescribed for you. Read the  directions carefully, and ask your doctor or other care provider to review them with you.      STOP taking        Dose Reason for stopping Comments    apixaban ANTICOAGULANT 5 MG tablet   Commonly known as:  ELIQUIS                      Prescriptions were sent or printed at these locations (3 Prescriptions)                   Other Prescriptions                Printed at Department/Unit printer (3 of 3)         oxyCODONE (ROXICODONE) 5 MG IR tablet               ondansetron (ZOFRAN ODT) 4 MG ODT tab               tamsulosin (FLOMAX) 0.4 MG capsule                Procedures and tests performed during your visit     Basic metabolic panel    CBC with platelets differential    CT Abdomen Pelvis w/o Contrast    Peripheral IV: Standard    UA with Microscopic      Orders Needing Specimen Collection     None      Pending Results     Date and Time Order Name Status Description    6/21/2017 1146 CT Abdomen Pelvis w/o Contrast Preliminary             Pending Culture Results     No orders found from 6/19/2017 to 6/22/2017.            Pending Results Instructions     If you had any lab results that were not finalized at the time of your Discharge, you can call the ED Lab Result RN at [phone]. You will be contacted by this team for any positive Lab results or changes in treatment. The nurses are available 7 days a week from 10A to 6:30P.  You can leave a message 24 hours per day and they will return your call.        Test Results From Your Hospital Stay        6/21/2017 12:03 PM      Component Results     Component Value Ref Range & Units Status    WBC 7.6 4.0 - 11.0 10e9/L Final    RBC Count 4.37 (L) 4.4 - 5.9 10e12/L Final    Hemoglobin 14.0 13.3 - 17.7 g/dL Final    Hematocrit 40.3 40.0 - 53.0 % Final    MCV 92 78 - 100 fl Final    MCH 32.0 26.5 - 33.0 pg Final    MCHC 34.7 31.5 - 36.5 g/dL Final    RDW 12.5 10.0 - 15.0 % Final    Platelet Count 204 150 - 450 10e9/L Final    Diff Method Automated Method  Final    %  Neutrophils 72.9 % Final    % Lymphocytes 18.6 % Final    % Monocytes 6.3 % Final    % Eosinophils 1.6 % Final    % Basophils 0.3 % Final    % Immature Granulocytes 0.3 % Final    Nucleated RBCs 0 0 /100 Final    Absolute Neutrophil 5.6 1.6 - 8.3 10e9/L Final    Absolute Lymphocytes 1.4 0.8 - 5.3 10e9/L Final    Absolute Monocytes 0.5 0.0 - 1.3 10e9/L Final    Absolute Eosinophils 0.1 0.0 - 0.7 10e9/L Final    Absolute Basophils 0.0 0.0 - 0.2 10e9/L Final    Abs Immature Granulocytes 0.0 0 - 0.4 10e9/L Final    Absolute Nucleated RBC 0.0  Final         6/21/2017 12:18 PM      Component Results     Component Value Ref Range & Units Status    Sodium 136 133 - 144 mmol/L Final    Potassium 4.2 3.4 - 5.3 mmol/L Final    Chloride 104 94 - 109 mmol/L Final    Carbon Dioxide 23 20 - 32 mmol/L Final    Anion Gap 9 3 - 14 mmol/L Final    Glucose 225 (H) 70 - 99 mg/dL Final    Urea Nitrogen 19 7 - 30 mg/dL Final    Creatinine 0.99 0.66 - 1.25 mg/dL Final    GFR Estimate 77 >60 mL/min/1.7m2 Final    Non  GFR Calc    GFR Estimate If Black >90   GFR Calc   >60 mL/min/1.7m2 Final    Calcium 9.3 8.5 - 10.1 mg/dL Final         6/21/2017 12:12 PM      Component Results     Component Value Ref Range & Units Status    Color Urine Yellow  Final    Appearance Urine Slightly Cloudy  Final    Glucose Urine 30 (A) NEG mg/dL Final    Bilirubin Urine Negative NEG Final    Ketones Urine Negative NEG mg/dL Final    Specific Gravity Urine 1.013 1.003 - 1.035 Final    Blood Urine Large (A) NEG Final    pH Urine 5.0 5.0 - 7.0 pH Final    Protein Albumin Urine 30 (A) NEG mg/dL Final    Urobilinogen mg/dL Normal 0.0 - 2.0 mg/dL Final    Nitrite Urine Negative NEG Final    Leukocyte Esterase Urine Negative NEG Final    Source Midstream Urine  Final    WBC Urine 4 (H) 0 - 2 /HPF Final    RBC Urine >182 (H) 0 - 2 /HPF Final    Mucous Urine Present (A) NEG /LPF Final         6/21/2017 12:37 PM      Narrative     CT  ABDOMEN/PELVIS WITHOUT CONTRAST June 21, 2017 12:24 PM     HISTORY: Abdominal pain.    TECHNIQUE: Noncontrast CT abdomen and pelvis was performed. Radiation  dose for this scan was reduced using automated exposure control,  adjustment of the mA and/or kV according to patient size, or iterative  reconstruction technique.    COMPARISON: CT abdomen and pelvis 5/2/2016.    FINDINGS: 0.4 cm stone within the left posterior bladder lumen just in  the region of the ureterovesical junction is noted series 2 image 84.  Associated mild left hydronephrosis and some left renal edema. On the  prior study there was a similar appearing stone that was slightly more  proximal in position. Stable nonobstructing stone lower posterior left  kidney that is 0.2 cm image 39. Other layering stones within the  posterior bladder again noted. For example, a flat stone or collection  of tiny layering stones measures a medial to lateral length of 2.9 cm  on series 2 image 83 but only has a thickness of 0.5 cm. There are  other small layering stones in the bladder as well. No right  urolithiasis or right hydronephrosis. Unremarkable unenhanced right  kidney. Multiple gallstones. Fatty liver. Spleen, adrenals, and  pancreas shows no acute abnormalities. Colonic diverticula.        Impression     IMPRESSION:  1. 0.4 cm stone within the left posterior bladder lumen just in the  region of the left ureterovesical junction. Associated mild left  hydronephrosis. This stone could be the stone described on the older  exam from 5/2/2016 but has moved more distally.  2. Additional stable intrarenal stone at the left kidney. Multiple  bladder stones again noted layering posteriorly.  3. Fatty liver.  4. No other acute abnormality.                Clinical Quality Measure: Blood Pressure Screening     Your blood pressure was checked while you were in the emergency department today. The last reading we obtained was  BP: (!) 196/102 . Please read the guidelines  "below about what these numbers mean and what you should do about them.  If your systolic blood pressure (the top number) is less than 120 and your diastolic blood pressure (the bottom number) is less than 80, then your blood pressure is normal. There is nothing more that you need to do about it.  If your systolic blood pressure (the top number) is 120-139 or your diastolic blood pressure (the bottom number) is 80-89, your blood pressure may be higher than it should be. You should have your blood pressure rechecked within a year by a primary care provider.  If your systolic blood pressure (the top number) is 140 or greater or your diastolic blood pressure (the bottom number) is 90 or greater, you may have high blood pressure. High blood pressure is treatable, but if left untreated over time it can put you at risk for heart attack, stroke, or kidney failure. You should have your blood pressure rechecked by a primary care provider within the next 4 weeks.  If your provider in the emergency department today gave you specific instructions to follow-up with your doctor or provider even sooner than that, you should follow that instruction and not wait for up to 4 weeks for your follow-up visit.        Thank you for choosing [facility]       Thank you for choosing [facility] for your care. Our goal is always to provide you with excellent care. Hearing back from our patients is one way we can continue to improve our services. Please take a few minutes to complete the written survey that you may receive in the mail after you visit with us. Thank you!        [employer] Information     [facility] [employer] [facility] lets you send messages to your doctor, view your test results, renew your prescriptions, schedule appointments and more. To sign up, go to [email or web address] . Click on \"Log in\" on the left side of the screen, which will take you to the Welcome page. Then click on \"Sign up Now\" on the right side of the page.     You will be asked to enter " the access code listed below, as well as some personal information. Please follow the directions to create your username and password.     Your access code is: [email or web address]  Expires: 2017  1:33 PM     Your access code will  in 90 days. If you need help or a new code, please call your [place] clinic or [phone].        Care EveryWhere ID     This is your Care EveryWhere ID. This could be used by other organizations to access your [place] medical records  [id number]        Equal Access to Services     [facility]: Brandon Latham, yung kaiser, gregory chongalalireza alexandra, terrence torres . So [facility] [phone].    ATENCIÓN: Si habla español, tiene a mcneil disposición servicios gratuitos de asistencia lingüística. Llame al [phone].    We comply with applicable federal civil rights laws and [place] laws. We do not discriminate on the basis of race, color, national origin, age, disability sex, sexual orientation or gender identity.            After Visit Summary       This is your record. Keep this with you and show to your community pharmacist(s) and doctor(s) at your next visit.

## 2017-06-21 NOTE — ED AVS SNAPSHOT
Emergency Department    [address] [facility] [address]    Phone:  [phone]    Fax:  [phone]                                       Peterson Vazquez   MRN: [id number]    Department:   Emergency Department   Date of Visit:  6/21/2017           After Visit Summary Signature Page     I have received my discharge instructions, and my questions have been answered. I have discussed any challenges I see with this plan with the nurse or doctor.    ..........................................................................................................................................  Patient/Patient Representative Signature      ..........................................................................................................................................  Patient Representative Print Name and Relationship to Patient    ..................................................               ................................................  Date                                            Time    ..........................................................................................................................................  Reviewed by Signature/Title    ...................................................              ..............................................  Date                                                            Time

## 2018-02-26 DIAGNOSIS — I10 ESSENTIAL HYPERTENSION: Chronic | ICD-10-CM

## 2018-02-26 NOTE — TELEPHONE ENCOUNTER
"lisinopril (PRINIVIL/ZESTRIL) 10 MG tablet 90 tablet 3 3/3/2017       Last Written Prescription Date:  03/03/2017  Last Fill Quantity: 90,  # refills: 3   Last office visit: 3/3/2017 with prescribing provider:  Carolynn    Future Office Visit:  Unknown  Requested Prescriptions   Pending Prescriptions Disp Refills     lisinopril (PRINIVIL/ZESTRIL) 10 MG tablet [Pharmacy Med Name: LISINOPRIL TABS 10MG] 90 tablet 3     Sig: TAKE 1 TABLET DAILY    ACE Inhibitors (Including Combos) Protocol Failed    2/26/2018 12:08 AM       Failed - Blood pressure under 140/90 in past 12 months    BP Readings from Last 3 Encounters:   06/21/17 156/86   03/28/17 144/80   03/03/17 126/62                Passed - Recent or future visit with authorizing provider's specialty    Patient had office visit in the last year or has a visit in the next 30 days with authorizing provider.  See \"Patient Info\" tab in inbasket, or \"Choose Columns\" in Meds & Orders section of the refill encounter.            Passed - Patient is age 18 or older       Passed - Normal serum creatinine on file in past 12 months    Recent Labs   Lab Test  06/21/17   1128   CR  0.99            Passed - Normal serum potassium on file in past 12 months    Recent Labs   Lab Test  06/21/17   1128   POTASSIUM  4.2               "

## 2018-02-27 RX ORDER — LISINOPRIL 10 MG/1
TABLET ORAL
Qty: 90 TABLET | Refills: 0 | Status: SHIPPED | OUTPATIENT
Start: 2018-02-27 | End: 2018-04-27

## 2018-02-27 NOTE — TELEPHONE ENCOUNTER
Prescription approved per [facility] Refill Protocol. Due for Px in March, notified via Rx note

## 2018-03-02 NOTE — TELEPHONE ENCOUNTER
Called pt he did not have his work schedule in front of him.  When he gets it pulled up he will call back to schedule physical appt.  Said it would have to be after April 15 as this is tax season for him.

## 2018-03-21 ENCOUNTER — TRANSFERRED RECORDS (OUTPATIENT)
Dept: HEALTH INFORMATION MANAGEMENT | Facility: CLINIC | Age: 58
End: 2018-03-21

## 2018-03-28 DIAGNOSIS — I10 ESSENTIAL HYPERTENSION: Chronic | ICD-10-CM

## 2018-03-29 RX ORDER — ATORVASTATIN CALCIUM 40 MG/1
40 TABLET, FILM COATED ORAL DAILY
Qty: 90 TABLET | Refills: 0 | Status: SHIPPED | OUTPATIENT
Start: 2018-03-29 | End: 2018-04-27

## 2018-03-29 NOTE — TELEPHONE ENCOUNTER
Next 5 appointments (look out 90 days)     Apr 27, 2018  9:30 AM CDT   PHYSICAL with Yazmin Pradhan,    [facility]    [address] [facility] [address]   [phone]                Medication is being filled for 1 time refill only due to:  Patient needs to be seen because it has been more than one year since last visit.  Olga ANTHONY RN

## 2018-03-29 NOTE — TELEPHONE ENCOUNTER
"Last Written Prescription Date:  3/03/17  Last Fill Quantity: 90 tablet,  # refills: 3   Last office visit: 3/3/2017 with prescribing provider:  Carolynn   Future Office Visit:   Next 5 appointments (look out 90 days)     Apr 27, 2018  9:30 AM CDT   PHYSICAL with Yazmin Pradhan,    [facility]    [address] [facility] [id number] [phone]                 Requested Prescriptions   Pending Prescriptions Disp Refills     atorvastatin (LIPITOR) 40 MG tablet [Pharmacy Med Name: ATORVASTATIN TABS 40MG] 90 tablet 3     Sig: TAKE 1 TABLET DAILY    Statins Protocol Failed    3/28/2018 11:41 PM       Failed - LDL on file in past 12 months    Recent Labs   Lab Test  03/03/17   1012   LDL  87            Failed - Recent (12 mo) or future (30 days) visit within the authorizing provider's specialty    Patient had office visit in the last 12 months or has a visit in the next 30 days with authorizing provider or within the authorizing provider's specialty.  See \"Patient Info\" tab in inbasket, or \"Choose Columns\" in Meds & Orders section of the refill encounter.           Passed - No abnormal creatine kinase in past 12 months    No lab results found.            Passed - Patient is age 18 or older          "

## 2018-04-04 ENCOUNTER — TELEPHONE (OUTPATIENT)
Dept: FAMILY MEDICINE | Facility: CLINIC | Age: 58
End: 2018-04-04

## 2018-04-04 DIAGNOSIS — G47.33 OSA (OBSTRUCTIVE SLEEP APNEA): Primary | Chronic | ICD-10-CM

## 2018-04-04 NOTE — TELEPHONE ENCOUNTER
Called pt and gave him the number to [facility].  Audelia Sosa MA

## 2018-04-04 NOTE — TELEPHONE ENCOUNTER
Referral placed for [facility] - please see number in the orders and give that to Peterson to schedule an appointment with our [facility]. Thanks!

## 2018-04-04 NOTE — TELEPHONE ENCOUNTER
Reason for Call: Request for an order or referral:    Order or referral being requested: Patient is very flustered with [facility]    And him and his wife are fed up with them and is asking for a New Referral for another Sleep  Place, they do have enough equipment for a while but please call with a New place for them to  Go to  Thank you    Date needed: at your convenience    Has the patient been seen by the PCP for this problem? YES    Additional comments: call once a New referral has been put in for another Sleep Place    Phone number Patient can be reached at:  Work number on file:  [phone] (work)    Best Time:  anytime    Can we leave a detailed message on this number?  YES    Call taken on 4/4/2018 at 10:18 AM by Ramos Becerril

## 2018-04-26 NOTE — PROGRESS NOTES
SUBJECTIVE:   CC: Peterson Vazquez is an 57 year old male who presents for preventative health visit.     Healthy Habits:    Do you get at least three servings of calcium containing foods daily (dairy, green leafy vegetables, etc.)? yes    Amount of exercise or daily activities, outside of work: 4 day(s) per week    Problems taking medications regularly No    Medication side effects: No    Have you had an eye exam in the past two years? yes    Do you see a dentist twice per year? yes    Do you have sleep apnea, excessive snoring or daytime drowsiness? Yes - AMERICA on CPAP    Adrian is now done with tax season and has a few long-weekends planned  His dad (who was also my patient) passed away this December so he is grieving that appropriately  Supportive wife and they are both working on weight loss together. Started ketogenic diet a few weeks ago and already seeing weight drop which is positive reinforcement for him to keep going. Planting a garden in their backyard. He doesn't check his blood sugars at all but is compliant with medications.  In process of getting CPAP supplies through [facility].   Also did have f/u with heme last year and can remain off of his anticoagulation and no chronic DVT noted on f/u US.  Denies chest pain, dyspnea, bowel or bladder changes.  He recalls that hs f/u colonoscopy was due about age 58 and wants ordered next year. We are unsure of exact date from outside facility but says first colonoscopy was at age 48 and told to f/u in 10 yrs.    Today's PHQ-2 Score:   PHQ-2 ( 1999 Pf[employer]) 3/3/2017 6/12/2016   Q1: Little interest or pleasure in doing things 0 0   Q2: Feeling down, depressed or hopeless 0 0   PHQ-2 Score 0 0       Abuse: Current or Past(Physical, Sexual or Emotional)- No  Do you feel safe in your environment - Yes    Social History   Substance Use Topics     Smoking status: Never Smoker     Smokeless tobacco: Never Used     Alcohol use 0.0 oz/week     0 Standard drinks or  "equivalent per week      Comment: rarely      If you drink alcohol do you typically have >3 drinks per day or >7 drinks per week? No                      Last PSA: No results found for: PSA    Reviewed orders with patient. Reviewed health maintenance and updated orders accordingly - Yes    ROS:  Comprehensive ROS negative unless as stated above in HPI.    Wt Readings from Last 4 Encounters:   04/27/18 321 lb (145.6 kg)   03/28/17 (!) 333 lb 9.6 oz (151.3 kg)   03/03/17 (!) 341 lb (154.7 kg)       OBJECTIVE:   /67 (BP Location: Right arm, Patient Position: Chair, Cuff Size: Adult Large)  Pulse 77  Temp 97.9  F (36.6  C) (Tympanic)  Ht 6' 2\" (1.88 m)  Wt 321 lb (145.6 kg)  SpO2 93%  BMI 41.21 kg/m2  EXAM:  GENERAL:  alert and no distress, morbidly obese  EYES: Eyes grossly normal to inspection, PERRL and conjunctivae and sclerae normal  HENT: oropharynx clear, oral mucous membranes moist and soft cerumen impaction bilaterally he wants to remove himself at home  NECK: no adenopathy, no asymmetry, masses, or scars and thyroid normal to palpation  RESP: lungs clear to auscultation - no rales, rhonchi or wheezes  CV: regular rate and rhythm, normal S1 S2, no S3 or S4, no murmur, click or rub, no peripheral edema and no carotid bruits  ABDOMEN: obese, soft, nontender, no hepatosplenomegaly, no masses and bowel sounds normal  MS: no gross musculoskeletal defects noted   SKIN: venous stasis hyperpigmentation bilateral lower extremities  with obese legs, no edema  NEURO: Normal strength and tone, mentation intact and speech normal  PSYCH: mentation appears normal, affect normal/bright  Diabetic foot exam: callus right 3rd toe without signs of infection, onychomycosis and thick toenails, diminished sensation    ASSESSMENT/PLAN:   1. Encounter for preventative adult health care exam with abnormal findings  Colonoscopy to be ordered at next year's physical  He'd like to hold off on tetanus booster given plans for " working out in garden this weekend    2. Type 2 diabetes mellitus with diabetic nephropathy & neuropathy, without long-term current use of insulin (H)  New diet sounds like a good start; moderation encouraged  As he looses weight I suggested he check his sugars more regularly and f/u closely with me  Ok to restart ASA 81 mg daily as is off of his anticoagulant  Monitor callus on bottom of right 3rd toe which he didn't even know he had  - FOOT EXAM  NO CHARGE [60276.114]  - HEMOGLOBIN A1C  - Lipid panel reflex to direct LDL Fasting  - Albumin Random Urine Quantitative with Creat Ratio  - metFORMIN (GLUCOPHAGE-XR) 500 MG 24 hr tablet; Take 2 tablets (1,000 mg) by mouth 2 times daily (with meals)  Dispense: 360 tablet; Refill: 3  - atorvastatin (LIPITOR) 40 MG tablet; Take 1 tablet (40 mg) by mouth daily  Dispense: 90 tablet; Refill: 3  - CBC with platelets  - Comprehensive metabolic panel  - aspirin 81 MG EC tablet; Take 1 tablet (81 mg) by mouth daily  Dispense: 90 tablet; Refill: 3    3. Rosacea  Dose working well and no GERD; very well controlled today  - minocycline (MINOCIN/DYNACIN) 100 MG capsule; TAKE 1 CAPSULE DAILY WITH FULL GLASS OF WATER  Dispense: 90 capsule; Refill: 3    4. AMERICA (obstructive sleep apnea)  [facility] helping with supplies    5. Morbid obesity (H)  Diet discussed    6. Essential hypertension; goal < 140/90  At goal  - lisinopril (PRINIVIL/ZESTRIL) 10 MG tablet; Take 1 tablet (10 mg) by mouth daily  Dispense: 90 tablet; Refill: 3    7. History of nephrolithiasis  Monitor for recurrence with changes in diet    8. Deep venous thrombosis (DVT) of right peroneal vein (H)  Resolved and is now off of anticoagulation and repeat US was negative - appreciated hematology assistance  Risk factor was likely the similarly located cellulitis at the time      COUNSELING:  Reviewed preventive health counseling, as reflected in patient instructions       Regular exercise       Healthy  "diet/nutrition   reports that he has never smoked. He has never used smokeless tobacco.  Estimated body mass index is 41.21 kg/(m^2) as calculated from the following:    Height as of this encounter: 6' 2\" (1.88 m).    Weight as of this encounter: 321 lb (145.6 kg).   Weight management plan: Discussed healthy diet and exercise guidelines and patient will follow up in 6 months in clinic to re-evaluate.    Patient Instructions   Meds refilled  Keep an eye on your sugars as you loose weight  Continue with [facility]  Keep an eye on your right middle toe as has a callus developing there (try pumice stone)  Restart aspirin 81 mg daily  Follow up about 4-6 months based on how your diet is going    Yazmin Pradhan, DO  [facility] [place]  "

## 2018-04-26 NOTE — PATIENT INSTRUCTIONS
Meds refilled  Keep an eye on your sugars as you loose weight  Continue with [facility]  Keep an eye on your right middle toe as has a callus developing there (try pumice stone)  Restart aspirin 81 mg daily  Follow up about 4-6 months based on how your diet is going    Preventive Health Recommendations  Male Ages 50   64    Yearly exam:             See your health care provider every year in order to  o   Review health changes.   o   Discuss preventive care.    o   Review your medicines if your doctor has prescribed any.     Have a cholesterol test every 5 years, or more frequently if you are at risk for high cholesterol/heart disease.     Have a diabetes test (fasting glucose) every three years. If you are at risk for diabetes, you should have this test more often.     Have a colonoscopy at age 50, or have a yearly FIT test (stool test). These exams will check for colon cancer.      Talk with your health care provider about whether or not a prostate cancer screening test (PSA) is right for you.    You should be tested each year for STDs (sexually transmitted diseases), if you re at risk.     Shots: Get a flu shot each year. Get a tetanus shot every 10 years.     Nutrition:    Eat at least 5 servings of fruits and vegetables daily.     Eat whole-grain bread, whole-wheat pasta and brown rice instead of white grains and rice.     Talk to your provider about Calcium and Vitamin D.     Lifestyle    Exercise for at least 150 minutes a week (30 minutes a day, 5 days a week). This will help you control your weight and prevent disease.     Limit alcohol to one drink per day.     No smoking.     Wear sunscreen to prevent skin cancer.     See your dentist every six months for an exam and cleaning.     See your eye doctor every 1 to 2 years.

## 2018-04-27 ENCOUNTER — OFFICE VISIT (OUTPATIENT)
Dept: FAMILY MEDICINE | Facility: CLINIC | Age: 58
End: 2018-04-27
Payer: COMMERCIAL

## 2018-04-27 VITALS
BODY MASS INDEX: 40.43 KG/M2 | TEMPERATURE: 97.9 F | HEART RATE: 77 BPM | HEIGHT: 74 IN | OXYGEN SATURATION: 93 % | DIASTOLIC BLOOD PRESSURE: 67 MMHG | SYSTOLIC BLOOD PRESSURE: 107 MMHG | WEIGHT: 315 LBS

## 2018-04-27 DIAGNOSIS — L71.9 ROSACEA: ICD-10-CM

## 2018-04-27 DIAGNOSIS — E11.42 DIABETIC POLYNEUROPATHY ASSOCIATED WITH TYPE 2 DIABETES MELLITUS (H): ICD-10-CM

## 2018-04-27 DIAGNOSIS — I82.451: ICD-10-CM

## 2018-04-27 DIAGNOSIS — Z87.442 HISTORY OF NEPHROLITHIASIS: Chronic | ICD-10-CM

## 2018-04-27 DIAGNOSIS — E11.21 TYPE 2 DIABETES MELLITUS WITH DIABETIC NEPHROPATHY, WITHOUT LONG-TERM CURRENT USE OF INSULIN (H): Chronic | ICD-10-CM

## 2018-04-27 DIAGNOSIS — I10 ESSENTIAL HYPERTENSION: Chronic | ICD-10-CM

## 2018-04-27 DIAGNOSIS — G47.33 OSA (OBSTRUCTIVE SLEEP APNEA): Chronic | ICD-10-CM

## 2018-04-27 DIAGNOSIS — E66.01 MORBID OBESITY (H): Chronic | ICD-10-CM

## 2018-04-27 DIAGNOSIS — Z00.01 ENCOUNTER FOR PREVENTATIVE ADULT HEALTH CARE EXAM WITH ABNORMAL FINDINGS: Primary | ICD-10-CM

## 2018-04-27 LAB
ALBUMIN SERPL-MCNC: 4.2 G/DL (ref 3.4–5)
ALP SERPL-CCNC: 102 U/L (ref 40–150)
ALT SERPL W P-5'-P-CCNC: 48 U/L (ref 0–70)
ANION GAP SERPL CALCULATED.3IONS-SCNC: 9 MMOL/L (ref 3–14)
AST SERPL W P-5'-P-CCNC: 22 U/L (ref 0–45)
BILIRUB SERPL-MCNC: 0.6 MG/DL (ref 0.2–1.3)
BUN SERPL-MCNC: 24 MG/DL (ref 7–30)
CALCIUM SERPL-MCNC: 9.5 MG/DL (ref 8.5–10.1)
CHLORIDE SERPL-SCNC: 109 MMOL/L (ref 94–109)
CHOLEST SERPL-MCNC: 175 MG/DL
CO2 SERPL-SCNC: 22 MMOL/L (ref 20–32)
CREAT SERPL-MCNC: 0.89 MG/DL (ref 0.66–1.25)
CREAT UR-MCNC: 101 MG/DL
ERYTHROCYTE [DISTWIDTH] IN BLOOD BY AUTOMATED COUNT: 12.6 % (ref 10–15)
GFR SERPL CREATININE-BSD FRML MDRD: 88 ML/MIN/1.7M2
GLUCOSE SERPL-MCNC: 158 MG/DL (ref 70–99)
HBA1C MFR BLD: 6.6 % (ref 0–5.6)
HCT VFR BLD AUTO: 43.1 % (ref 40–53)
HDLC SERPL-MCNC: 37 MG/DL
HGB BLD-MCNC: 14.2 G/DL (ref 13.3–17.7)
LDLC SERPL CALC-MCNC: 77 MG/DL
MCH RBC QN AUTO: 31.6 PG (ref 26.5–33)
MCHC RBC AUTO-ENTMCNC: 32.9 G/DL (ref 31.5–36.5)
MCV RBC AUTO: 96 FL (ref 78–100)
MICROALBUMIN UR-MCNC: 18 MG/L
MICROALBUMIN/CREAT UR: 18.32 MG/G CR (ref 0–17)
NONHDLC SERPL-MCNC: 138 MG/DL
PLATELET # BLD AUTO: 185 10E9/L (ref 150–450)
POTASSIUM SERPL-SCNC: 4.3 MMOL/L (ref 3.4–5.3)
PROT SERPL-MCNC: 7.5 G/DL (ref 6.8–8.8)
RBC # BLD AUTO: 4.5 10E12/L (ref 4.4–5.9)
SODIUM SERPL-SCNC: 140 MMOL/L (ref 133–144)
TRIGL SERPL-MCNC: 305 MG/DL
WBC # BLD AUTO: 6.3 10E9/L (ref 4–11)

## 2018-04-27 PROCEDURE — 83036 HEMOGLOBIN GLYCOSYLATED A1C: CPT | Performed by: INTERNAL MEDICINE

## 2018-04-27 PROCEDURE — 36415 COLL VENOUS BLD VENIPUNCTURE: CPT | Performed by: INTERNAL MEDICINE

## 2018-04-27 PROCEDURE — 99396 PREV VISIT EST AGE 40-64: CPT | Mod: 25 | Performed by: INTERNAL MEDICINE

## 2018-04-27 PROCEDURE — 80053 COMPREHEN METABOLIC PANEL: CPT | Performed by: INTERNAL MEDICINE

## 2018-04-27 PROCEDURE — 80061 LIPID PANEL: CPT | Performed by: INTERNAL MEDICINE

## 2018-04-27 PROCEDURE — 99207 C FOOT EXAM  NO CHARGE: CPT | Mod: 25 | Performed by: INTERNAL MEDICINE

## 2018-04-27 PROCEDURE — 85027 COMPLETE CBC AUTOMATED: CPT | Performed by: INTERNAL MEDICINE

## 2018-04-27 PROCEDURE — 82043 UR ALBUMIN QUANTITATIVE: CPT | Performed by: INTERNAL MEDICINE

## 2018-04-27 RX ORDER — VIT C/B6/B5/MAGNESIUM/HERB 173 50-5-6-5MG
1 CAPSULE ORAL DAILY
COMMUNITY
Start: 2018-04-27

## 2018-04-27 RX ORDER — MINOCYCLINE HYDROCHLORIDE 100 MG/1
CAPSULE ORAL
Qty: 90 CAPSULE | Refills: 3 | Status: SHIPPED | OUTPATIENT
Start: 2018-04-27 | End: 2019-02-10

## 2018-04-27 RX ORDER — LISINOPRIL 10 MG/1
10 TABLET ORAL DAILY
Qty: 90 TABLET | Refills: 3 | Status: SHIPPED | OUTPATIENT
Start: 2018-04-27 | End: 2019-03-04

## 2018-04-27 RX ORDER — METFORMIN HCL 500 MG
1000 TABLET, EXTENDED RELEASE 24 HR ORAL 2 TIMES DAILY WITH MEALS
Qty: 360 TABLET | Refills: 3 | Status: SHIPPED | OUTPATIENT
Start: 2018-04-27 | End: 2019-03-04

## 2018-04-27 RX ORDER — ATORVASTATIN CALCIUM 40 MG/1
40 TABLET, FILM COATED ORAL DAILY
Qty: 90 TABLET | Refills: 3 | Status: SHIPPED | OUTPATIENT
Start: 2018-04-27 | End: 2019-03-04

## 2018-04-27 NOTE — LETTER
[facility]  [address] [facility]  [address]  [place]  [address]  Tel: [phone]    May 2, 2018    Peterson MACDONALD George  [address]  [facility] [address]        Dear Mr. VazquezAdrian,  It was nice to see you in clinic last week!  Your hard work is paying off. The glucose and A1c are much improved as is the urine protein level!  Liver testing (AST, ALT, alkaline phosphatase and bilirubin) is normal. Your complete blood count including white blood cells (infection fighting/inflammatory cells), hemoglobin (oxygen carrying) and platelets (which help blood clot) is normal.   Your cholesterol is still a bit high but should improve nicely with your continued healthy habits and remaining on your statin.  Continue your current medications but be aware of your blood pressure and sugar as you continue to loose weight. If you feel either is getting too low please let me know.  Please continue with the plan of care as we discussed and let me know if you have any questions/concerns. Thanks!     If you have any further questions or problems, please contact our office.      Sincerely,    Yazmin Pradhan DO/ Shahla Hernandez, MATTHEW  Results for orders placed or performed in visit on 04/27/18   HEMOGLOBIN A1C   Result Value Ref Range    Hemoglobin A1C 6.6 (H) 0 - 5.6 %   Lipid panel reflex to direct LDL Fasting   Result Value Ref Range    Cholesterol 175 <200 mg/dL    Triglycerides 305 (H) <150 mg/dL    HDL Cholesterol 37 (L) >39 mg/dL    LDL Cholesterol Calculated 77 <100 mg/dL    Non HDL Cholesterol 138 (H) <130 mg/dL   Albumin Random Urine Quantitative with Creat Ratio   Result Value Ref Range    Creatinine Urine 101 mg/dL    Albumin Urine mg/L 18 mg/L    Albumin Urine mg/g Cr 18.32 (H) 0 - 17 mg/g Cr   CBC with platelets   Result Value Ref Range    WBC 6.3 4.0 - 11.0 10e9/L    RBC Count 4.50 4.4 - 5.9 10e12/L    Hemoglobin 14.2 13.3 - 17.7 g/dL    Hematocrit 43.1 40.0 - 53.0 %    MCV 96 78 - 100 fl    MCH 31.6  26.5 - 33.0 pg    MCHC 32.9 31.5 - 36.5 g/dL    RDW 12.6 10.0 - 15.0 %    Platelet Count 185 150 - 450 10e9/L   Comprehensive metabolic panel   Result Value Ref Range    Sodium 140 133 - 144 mmol/L    Potassium 4.3 3.4 - 5.3 mmol/L    Chloride 109 94 - 109 mmol/L    Carbon Dioxide 22 20 - 32 mmol/L    Anion Gap 9 3 - 14 mmol/L    Glucose 158 (H) 70 - 99 mg/dL    Urea Nitrogen 24 7 - 30 mg/dL    Creatinine 0.89 0.66 - 1.25 mg/dL    GFR Estimate 88 >60 mL/min/1.7m2    GFR Estimate If Black >90 >60 mL/min/1.7m2    Calcium 9.5 8.5 - 10.1 mg/dL    Bilirubin Total 0.6 0.2 - 1.3 mg/dL    Albumin 4.2 3.4 - 5.0 g/dL    Protein Total 7.5 6.8 - 8.8 g/dL    Alkaline Phosphatase 102 40 - 150 U/L    ALT 48 0 - 70 U/L    AST 22 0 - 45 U/L               Enclosure: Lab Results

## 2018-04-27 NOTE — MR AVS SNAPSHOT
After Visit Summary   4/27/2018    Peterson Vazquez    MRN: [id number]           Patient Information     Date Of Birth          1960        Visit Information        Provider Department      4/27/2018 9:30 AM Yazmin Pradhan,  [facility]        Today's Diagnoses     Encounter for preventative adult health care exam with abnormal findings    -  1    Type 2 diabetes mellitus with diabetic nephropathy, without long-term current use of insulin (H)        Rosacea        Essential hypertension; goal < 140/90        AMERICA (obstructive sleep apnea)        History of nephrolithiasis        Deep venous thrombosis (DVT) of right peroneal vein (H)        Need for prophylactic vaccination with tetanus-diphtheria (TD)          Care Instructions    Meds refilled  Keep an eye on your sugars as you loose weight  Continue with [facility]  Keep an eye on your right middle toe as has a callus developing there (try pumice stone)  Restart aspirin 81 mg daily  Follow up about 4-6 months based on how your diet is going    Preventive Health Recommendations  Male Ages 50 - 64    Yearly exam:             See your health care provider every year in order to  o   Review health changes.   o   Discuss preventive care.    o   Review your medicines if your doctor has prescribed any.     Have a cholesterol test every 5 years, or more frequently if you are at risk for high cholesterol/heart disease.     Have a diabetes test (fasting glucose) every three years. If you are at risk for diabetes, you should have this test more often.     Have a colonoscopy at age 50, or have a yearly FIT test (stool test). These exams will check for colon cancer.      Talk with your health care provider about whether or not a prostate cancer screening test (PSA) is right for you.    You should be tested each year for STDs (sexually transmitted diseases), if you re at risk.     Shots: Get a flu shot each year. Get a tetanus  "shot every 10 years.     Nutrition:    Eat at least 5 servings of fruits and vegetables daily.     Eat whole-grain bread, whole-wheat pasta and brown rice instead of white grains and rice.     Talk to your provider about Calcium and Vitamin D.     Lifestyle    Exercise for at least 150 minutes a week (30 minutes a day, 5 days a week). This will help you control your weight and prevent disease.     Limit alcohol to one drink per day.     No smoking.     Wear sunscreen to prevent skin cancer.     See your dentist every six months for an exam and cleaning.     See your eye doctor every 1 to 2 years.            Follow-ups after your visit        Who to contact     If you have questions or need follow up information about today's clinic visit or your schedule please contact [facility] directly at [phone].  Normal or non-critical lab and imaging results will be communicated to you by MyChart, letter or phone within 4 business days after the clinic has received the results. If you do not hear from us within 7 days, please contact the clinic through [employer] or phone. If you have a critical or abnormal lab result, we will notify you by phone as soon as possible.  Submit refill requests through [employer] or call your pharmacy and they will forward the refill request to us. Please allow 3 business days for your refill to be completed.          Additional Information About Your Visit        [employer] Information     [employer] lets you send messages to your doctor, view your test results, renew your prescriptions, schedule appointments and more. To sign up, go to [email or web address] . Click on \"Log in\" on the left side of the screen, which will take you to the Welcome page. Then click on \"Sign up Now\" on the right side of the page.     You will be asked to enter the access code listed below, as well as some personal information. Please follow the directions to create your username and password.     Your access " "code is: [email or web address]  Expires: 2018 10:16 AM     Your access code will  in 90 days. If you need help or a new code, please call your [facility] clinic or [phone].        Care EveryWhere ID     This is your Care EveryWhere ID. This could be used by other organizations to access your [facility] medical records  [id number]        Your Vitals Were     Pulse Temperature Height Pulse Oximetry BMI (Body Mass Index)       77 97.9  F (36.6  C) (Tympanic) 6' 2\" (1.88 m) 93% 41.21 kg/m2        Blood Pressure from Last 3 Encounters:   18 107/67   17 156/86   17 144/80    Weight from Last 3 Encounters:   18 321 lb (145.6 kg)   17 (!) 310 lb (140.6 kg)   17 (!) 333 lb 9.6 oz (151.3 kg)              We Performed the Following     Albumin Random Urine Quantitative with Creat Ratio     CBC with platelets     Comprehensive metabolic panel     FOOT EXAM  NO CHARGE [43335.114]     HEMOGLOBIN A1C     Lipid panel reflex to direct LDL Fasting          Today's Medication Changes          These changes are accurate as of 18 10:16 AM.  If you have any questions, ask your nurse or doctor.               Start taking these medicines.        Dose/Directions    aspirin 81 MG EC tablet   Used for:  Type 2 diabetes mellitus with diabetic nephropathy, without long-term current use of insulin (H)   Started by:  Yazmin Pradhan DO        Dose:  81 mg   Take 1 tablet (81 mg) by mouth daily   Quantity:  90 tablet   Refills:  3         These medicines have changed or have updated prescriptions.        Dose/Directions    lisinopril 10 MG tablet   Commonly known as:  PRINIVIL/ZESTRIL   This may have changed:  See the new instructions.   Used for:  Essential hypertension   Changed by:  Yazmin Pradhan DO        Dose:  10 mg   Take 1 tablet (10 mg) by mouth daily   Quantity:  90 tablet   Refills:  3       minocycline 100 MG capsule   Commonly known as:  MINOCIN/DYNACIN   This may have changed:  See " the new instructions.   Used for:  Rosacea   Changed by:  Yazmin Pradhan DO        TAKE 1 CAPSULE DAILY WITH FULL GLASS OF WATER   Quantity:  90 capsule   Refills:  3         Stop taking these medicines if you haven't already. Please contact your care team if you have questions.     ASPIRIN NOT PRESCRIBED   Commonly known as:  INTENTIONAL   Stopped by:  Yazmin Pradhan DO                Where to get your medicines      These medications were sent to [facility] - [address]  [address] [place] [address]     Phone:  [phone]     atorvastatin 40 MG tablet    lisinopril 10 MG tablet    metFORMIN 500 MG 24 hr tablet    minocycline 100 MG capsule         Some of these will need a paper prescription and others can be bought over the counter.  Ask your nurse if you have questions.     You don't need a prescription for these medications     aspirin 81 MG EC tablet                Primary Care Provider Office Phone # Fax #    Yazmin Pradhan [phone] [address] [address]        Equal Access to Services     [facility]: Hadii aad ku hadasho Soomaali, waaxda luqadaha, qaybta kaalmada adeegyada, waxay idiin hayaan cristian torres . So [facility] [phone].    ATENCIÓN: Si habla español, tiene a mcneil disposición servicios gratuitos de asistencia lingüística. [facility] [phone].    We comply with applicable federal civil rights laws and [place] laws. We do not discriminate on the basis of race, color, national origin, age, disability, sex, sexual orientation, or gender identity.            Thank you!     Thank you for choosing [facility]  for your care. Our goal is always to provide you with excellent care. Hearing back from our patients is one way we can continue to improve our services. Please take a few minutes to complete the written survey that you may receive in the mail after your visit  with us. Thank you!             Your Updated Medication List - Protect others around you: Learn how to safely use, store and throw away your medicines at www.disposemymeds.org.          This list is accurate as of 4/27/18 10:16 AM.  Always use your most recent med list.                   Brand Name Dispense Instructions for use Diagnosis    aspirin 81 MG EC tablet     90 tablet    Take 1 tablet (81 mg) by mouth daily    Type 2 diabetes mellitus with diabetic nephropathy, without long-term current use of insulin (H)       atorvastatin 40 MG tablet    LIPITOR    90 tablet    Take 1 tablet (40 mg) by mouth daily    Type 2 diabetes mellitus with diabetic nephropathy, without long-term current use of insulin (H)       B-12 1000 MCG Tbcr     100 tablet    Take 1,000 mcg by mouth daily    Type 2 diabetes mellitus with diabetic nephropathy (H), Polyneuropathy associated with underlying disease (H)       blood glucose monitoring meter device kit    no brand specified    1 kit    1 Device daily    Diabetes mellitus, type 2 (H)       cholecalciferol 1000 UNIT tablet    vitamin D3    100 tablet    Take 1 tablet (1,000 Units) by mouth daily        DAILY MULTIVITAMIN PO      Take  by mouth.        lisinopril 10 MG tablet    PRINIVIL/ZESTRIL    90 tablet    Take 1 tablet (10 mg) by mouth daily    Essential hypertension       LORATADINE TABS 10 MG (RAPID) OR     90    1 tab po QD        metFORMIN 500 MG 24 hr tablet    GLUCOPHAGE-XR    360 tablet    Take 2 tablets (1,000 mg) by mouth 2 times daily (with meals)    Type 2 diabetes mellitus with diabetic nephropathy, without long-term current use of insulin (H)       minocycline 100 MG capsule    MINOCIN/DYNACIN    90 capsule    TAKE 1 CAPSULE DAILY WITH FULL GLASS OF WATER    Rosacea       RA TURMERIC 500 MG Caps   Generic drug:  Turmeric      Take 1 capsule by mouth daily

## 2018-04-27 NOTE — NURSING NOTE
"Chief Complaint   Patient presents with     Physical        Initial /67 (BP Location: Right arm, Patient Position: Chair, Cuff Size: Adult Large)  Pulse 77  Temp 97.9  F (36.6  C) (Tympanic)  Ht 6' 2\" (1.88 m)  Wt 321 lb (145.6 kg)  SpO2 93%  BMI 41.21 kg/m2 Estimated body mass index is 41.21 kg/(m^2) as calculated from the following:    Height as of this encounter: 6' 2\" (1.88 m).    Weight as of this encounter: 321 lb (145.6 kg)..    BP completed using cuff size: large  MEDICATIONS REVIEWED  SOCIAL AND FAMILY HX REVIEWED  Claudine Andrade CMA  "

## 2019-01-18 ENCOUNTER — APPOINTMENT (OUTPATIENT)
Dept: ULTRASOUND IMAGING | Facility: CLINIC | Age: 59
End: 2019-01-18
Payer: COMMERCIAL

## 2019-01-18 ENCOUNTER — HOSPITAL ENCOUNTER (INPATIENT)
Facility: CLINIC | Age: 59
LOS: 5 days | Discharge: HOME | End: 2019-01-23
Attending: PHYSICIAN ASSISTANT | Admitting: STUDENT IN AN ORGANIZED HEALTH CARE EDUCATION/TRAINING PROGRAM
Payer: COMMERCIAL

## 2019-01-18 DIAGNOSIS — E66.01 MORBID OBESITY (H): Chronic | ICD-10-CM

## 2019-01-18 DIAGNOSIS — R89.5 POSITIVE CULTURE FINDING: ICD-10-CM

## 2019-01-18 DIAGNOSIS — L03.116 CELLULITIS OF LEFT LOWER EXTREMITY: ICD-10-CM

## 2019-01-18 DIAGNOSIS — E11.21 TYPE 2 DIABETES MELLITUS WITH DIABETIC NEPHROPATHY, UNSPECIFIED WHETHER LONG TERM INSULIN USE (H): Chronic | ICD-10-CM

## 2019-01-18 DIAGNOSIS — A49.01 STAPH AUREUS INFECTION: ICD-10-CM

## 2019-01-18 DIAGNOSIS — I89.0 CHRONIC ACQUIRED LYMPHEDEMA: Chronic | ICD-10-CM

## 2019-01-18 DIAGNOSIS — I89.0 LYMPHEDEMA OF BOTH LOWER EXTREMITIES: ICD-10-CM

## 2019-01-18 DIAGNOSIS — A49.1 GROUP B STREPTOCOCCAL INFECTION: ICD-10-CM

## 2019-01-18 DIAGNOSIS — S91.302A OPEN WOUND OF LEFT FOOT, INITIAL ENCOUNTER: ICD-10-CM

## 2019-01-18 DIAGNOSIS — L03.116 LEFT LEG CELLULITIS: ICD-10-CM

## 2019-01-18 PROBLEM — G47.33 OSA (OBSTRUCTIVE SLEEP APNEA): Chronic | Status: ACTIVE | Noted: 2017-03-12

## 2019-01-18 PROBLEM — L03.90 CELLULITIS: Status: ACTIVE | Noted: 2019-01-18

## 2019-01-18 LAB
ALBUMIN SERPL-MCNC: 3.3 G/DL (ref 3.4–5)
ALP SERPL-CCNC: 103 U/L (ref 40–150)
ALT SERPL W P-5'-P-CCNC: 44 U/L (ref 0–70)
ANION GAP SERPL CALCULATED.3IONS-SCNC: 8 MMOL/L (ref 3–14)
AST SERPL W P-5'-P-CCNC: 19 U/L (ref 0–45)
BASOPHILS # BLD AUTO: 0 10E9/L (ref 0–0.2)
BASOPHILS NFR BLD AUTO: 0.1 %
BILIRUB SERPL-MCNC: 0.8 MG/DL (ref 0.2–1.3)
BUN SERPL-MCNC: 19 MG/DL (ref 7–30)
CALCIUM SERPL-MCNC: 9.3 MG/DL (ref 8.5–10.1)
CHLORIDE SERPL-SCNC: 104 MMOL/L (ref 94–109)
CO2 SERPL-SCNC: 24 MMOL/L (ref 20–32)
CREAT SERPL-MCNC: 0.9 MG/DL (ref 0.66–1.25)
DIFFERENTIAL METHOD BLD: ABNORMAL
EOSINOPHIL # BLD AUTO: 0.1 10E9/L (ref 0–0.7)
EOSINOPHIL NFR BLD AUTO: 1.2 %
ERYTHROCYTE [DISTWIDTH] IN BLOOD BY AUTOMATED COUNT: 12.9 % (ref 10–15)
GFR SERPL CREATININE-BSD FRML MDRD: >90 ML/MIN/{1.73_M2}
GLUCOSE SERPL-MCNC: 197 MG/DL (ref 70–99)
HCT VFR BLD AUTO: 36.3 % (ref 40–53)
HGB BLD-MCNC: 12.3 G/DL (ref 13.3–17.7)
IMM GRANULOCYTES # BLD: 0 10E9/L (ref 0–0.4)
IMM GRANULOCYTES NFR BLD: 0.5 %
LACTATE BLD-SCNC: 2 MMOL/L (ref 0.7–2)
LYMPHOCYTES # BLD AUTO: 1.2 10E9/L (ref 0.8–5.3)
LYMPHOCYTES NFR BLD AUTO: 15.4 %
MCH RBC QN AUTO: 32.3 PG (ref 26.5–33)
MCHC RBC AUTO-ENTMCNC: 33.9 G/DL (ref 31.5–36.5)
MCV RBC AUTO: 95 FL (ref 78–100)
MONOCYTES # BLD AUTO: 0.7 10E9/L (ref 0–1.3)
MONOCYTES NFR BLD AUTO: 8.9 %
NEUTROPHILS # BLD AUTO: 5.7 10E9/L (ref 1.6–8.3)
NEUTROPHILS NFR BLD AUTO: 73.9 %
NRBC # BLD AUTO: 0 10*3/UL
NRBC BLD AUTO-RTO: 0 /100
PLATELET # BLD AUTO: 177 10E9/L (ref 150–450)
POTASSIUM SERPL-SCNC: 3.8 MMOL/L (ref 3.4–5.3)
PROT SERPL-MCNC: 7.6 G/DL (ref 6.8–8.8)
RBC # BLD AUTO: 3.81 10E12/L (ref 4.4–5.9)
SODIUM SERPL-SCNC: 136 MMOL/L (ref 133–144)
WBC # BLD AUTO: 7.7 10E9/L (ref 4–11)

## 2019-01-18 PROCEDURE — 80053 COMPREHEN METABOLIC PANEL: CPT | Performed by: PHYSICIAN ASSISTANT

## 2019-01-18 PROCEDURE — 25000128 H RX IP 250 OP 636: Performed by: STUDENT IN AN ORGANIZED HEALTH CARE EDUCATION/TRAINING PROGRAM

## 2019-01-18 PROCEDURE — 87077 CULTURE AEROBIC IDENTIFY: CPT | Performed by: PHYSICIAN ASSISTANT

## 2019-01-18 PROCEDURE — 83605 ASSAY OF LACTIC ACID: CPT | Performed by: PHYSICIAN ASSISTANT

## 2019-01-18 PROCEDURE — 85025 COMPLETE CBC W/AUTO DIFF WBC: CPT | Performed by: PHYSICIAN ASSISTANT

## 2019-01-18 PROCEDURE — 96367 TX/PROPH/DG ADDL SEQ IV INF: CPT

## 2019-01-18 PROCEDURE — 87070 CULTURE OTHR SPECIMN AEROBIC: CPT | Performed by: PHYSICIAN ASSISTANT

## 2019-01-18 PROCEDURE — 99223 1ST HOSP IP/OBS HIGH 75: CPT | Mod: AI | Performed by: STUDENT IN AN ORGANIZED HEALTH CARE EDUCATION/TRAINING PROGRAM

## 2019-01-18 PROCEDURE — 99285 EMERGENCY DEPT VISIT HI MDM: CPT | Mod: 25

## 2019-01-18 PROCEDURE — 96366 THER/PROPH/DIAG IV INF ADDON: CPT

## 2019-01-18 PROCEDURE — 25000128 H RX IP 250 OP 636

## 2019-01-18 PROCEDURE — 87186 SC STD MICRODIL/AGAR DIL: CPT | Performed by: PHYSICIAN ASSISTANT

## 2019-01-18 PROCEDURE — 93971 EXTREMITY STUDY: CPT | Mod: LT

## 2019-01-18 PROCEDURE — 12000000 ZZH R&B MED SURG/OB

## 2019-01-18 PROCEDURE — 25000132 ZZH RX MED GY IP 250 OP 250 PS 637: Performed by: STUDENT IN AN ORGANIZED HEALTH CARE EDUCATION/TRAINING PROGRAM

## 2019-01-18 PROCEDURE — 96365 THER/PROPH/DIAG IV INF INIT: CPT

## 2019-01-18 PROCEDURE — 25000128 H RX IP 250 OP 636: Performed by: PHYSICIAN ASSISTANT

## 2019-01-18 RX ORDER — PIPERACILLIN SODIUM, TAZOBACTAM SODIUM 4; .5 G/20ML; G/20ML
4.5 INJECTION, POWDER, LYOPHILIZED, FOR SOLUTION INTRAVENOUS ONCE
Status: COMPLETED | OUTPATIENT
Start: 2019-01-18 | End: 2019-01-18

## 2019-01-18 RX ORDER — UREA 10 %
500 LOTION (ML) TOPICAL AT BEDTIME
COMMUNITY

## 2019-01-18 RX ORDER — SODIUM CHLORIDE 9 MG/ML
INJECTION, SOLUTION INTRAVENOUS CONTINUOUS
Status: DISCONTINUED | OUTPATIENT
Start: 2019-01-18 | End: 2019-01-20

## 2019-01-18 RX ORDER — ASPIRIN 81 MG/1
81 TABLET ORAL AT BEDTIME
Status: DISCONTINUED | OUTPATIENT
Start: 2019-01-18 | End: 2019-01-23 | Stop reason: HOSPADM

## 2019-01-18 RX ORDER — LABETALOL HYDROCHLORIDE 5 MG/ML
10 INJECTION, SOLUTION INTRAVENOUS EVERY 4 HOURS PRN
Status: DISCONTINUED | OUTPATIENT
Start: 2019-01-18 | End: 2019-01-23 | Stop reason: HOSPADM

## 2019-01-18 RX ORDER — LISINOPRIL 10 MG/1
10 TABLET ORAL AT BEDTIME
Status: DISCONTINUED | OUTPATIENT
Start: 2019-01-18 | End: 2019-01-23 | Stop reason: HOSPADM

## 2019-01-18 RX ORDER — PIPERACILLIN SODIUM, TAZOBACTAM SODIUM 3; .375 G/15ML; G/15ML
3.38 INJECTION, POWDER, LYOPHILIZED, FOR SOLUTION INTRAVENOUS EVERY 6 HOURS
Status: DISCONTINUED | OUTPATIENT
Start: 2019-01-19 | End: 2019-01-19

## 2019-01-18 RX ORDER — ATORVASTATIN CALCIUM 40 MG/1
40 TABLET, FILM COATED ORAL AT BEDTIME
Status: DISCONTINUED | OUTPATIENT
Start: 2019-01-18 | End: 2019-01-23 | Stop reason: HOSPADM

## 2019-01-18 RX ORDER — LORATADINE 10 MG/1
10 TABLET ORAL AT BEDTIME
COMMUNITY

## 2019-01-18 RX ORDER — NALOXONE HYDROCHLORIDE 0.4 MG/ML
.1-.4 INJECTION, SOLUTION INTRAMUSCULAR; INTRAVENOUS; SUBCUTANEOUS
Status: DISCONTINUED | OUTPATIENT
Start: 2019-01-18 | End: 2019-01-23 | Stop reason: HOSPADM

## 2019-01-18 RX ADMIN — PIPERACILLIN AND TAZOBACTAM 4.5 G: 4; .5 INJECTION, POWDER, FOR SOLUTION INTRAVENOUS at 18:35

## 2019-01-18 RX ADMIN — VANCOMYCIN HYDROCHLORIDE 2500 MG: 5 INJECTION, POWDER, LYOPHILIZED, FOR SOLUTION INTRAVENOUS at 19:41

## 2019-01-18 RX ADMIN — LISINOPRIL 10 MG: 10 TABLET ORAL at 22:51

## 2019-01-18 RX ADMIN — SODIUM CHLORIDE: 9 INJECTION, SOLUTION INTRAVENOUS at 23:00

## 2019-01-18 RX ADMIN — PIPERACILLIN SODIUM,TAZOBACTAM SODIUM 3.38 G: 3; .375 INJECTION, POWDER, FOR SOLUTION INTRAVENOUS at 23:59

## 2019-01-18 RX ADMIN — ATORVASTATIN CALCIUM 40 MG: 40 TABLET, FILM COATED ORAL at 22:51

## 2019-01-18 RX ADMIN — ASPIRIN 81 MG: 81 TABLET, COATED ORAL at 22:51

## 2019-01-18 RX ADMIN — SODIUM CHLORIDE 500 ML: 9 INJECTION, SOLUTION INTRAVENOUS at 18:16

## 2019-01-18 ASSESSMENT — ENCOUNTER SYMPTOMS
VOMITING: 0
FEVER: 0
SHORTNESS OF BREATH: 0
NUMBNESS: 0
COLOR CHANGE: 1

## 2019-01-18 ASSESSMENT — MIFFLIN-ST. JEOR: SCORE: 2315.39

## 2019-01-18 NOTE — ED PROVIDER NOTES
History     Chief Complaint:  Wound check     HPI   Peterson Vazquez is a 58 year old male, with a history of type 2 diabetes, DVT, hyperlipidemia, and hypertension, who presents with concerns of cellulitis on his left leg. The patient states that he has a history of similar presentation of cellulitis, approximately 2 years ago, and required hospitalization for IV antibiotics. Recently, the patient states that he had developed a sudden fever and vomiting 3 days ago while at work. The patient notes that he felt improved after 2 days with no vomiting or fever, but noticed his left leg was red and swollen. The leg continued to swell, prompting his ED visit. Here, the patient denies any chest pain, shortness of breath, recent vomiting or fever, changes in left leg sensation. The patient states that he does have a history of DVT and is currently on aspirin therapy.     Cardiac/PE/DVT Risk Factors:  The patient has a history of hypertension, hyperlipidemia, diabetes, but no history of smoking. The patient endorses a personal history of DVT. The patient reports no recent travel, surgery, or other immobilizations.      Allergies:  Fluoxetine Hcl      Medications:    Aspirin  Atorvastatin  Lisinopril  Loratadine  Metformin  Minocycline  Turmeric      Past Medical History:    DVT  Depression  Hypertension  Morbid obesity  Hyperlipidemia  Type 2 diabetes   Hypertension    Past Surgical History:    [place] teeth extraction  Tonsillectomy    Family History:    Brain cancer  Lung cancer  CABG  Alzheimer Disease  MI  COPD    Social History:  Presents with his wife.  Positive for alcohol use.   Negative for tobacco use.   Marital Status:   [2]     Review of Systems   Constitutional: Negative for fever (resolved).   Respiratory: Negative for shortness of breath.    Cardiovascular: Positive for leg swelling. Negative for chest pain.   Gastrointestinal: Negative for vomiting (resolved).   Skin: Positive for color change.  "  Neurological: Negative for numbness.   All other systems reviewed and are negative.      Physical Exam   First Vitals:  BP: (!) 184/94  Pulse: 98  Temp: 97.8  F (36.6  C)  Resp: 16  Height: 185.4 cm (6' 1\")  Weight: 145.2 kg (320 lb)  SpO2: 98 %     Physical Exam  General: Resting comfortably.  Alert and oriented.   Head:  The scalp, face, and head appear normal   Eyes:  Conjunctivae and sclerae are normal    ENT:    The oropharynx is normal    Uvula is in the midline     Moist mucous membranes   Neck:  No lymphadenopathy  CV:  Regular rate and rhythm     Normal S1/S2    DP and PT pulses intact in aaron lower extremities.   Resp:  Lungs are clear to auscultation    Non-labored    No rales or wheezing   GI:  Abdomen is soft, non-distended    No abdominal tenderness   MS:  Good ROM in the left ankle and foot. Mild tenderness throughout the left lower leg.   Skin:  Swelling, warmth, and erythema to the entire left anterior lower leg. Fissure to the bottom of the left foot in between the 4th and 5th toe.   Neuro: Sensation is intact throughout.       Emergency Department Course   Imaging:  Radiographic findings were communicated with the patient who voiced understanding of the findings.  US Lower Extremity Venous Duplex, left :  No deep venous thrombosis demonstrated. As per radiology.     Laboratory:  CBC: WBC: 7.7, HGB: 12.3 (L), PLT: 177  CMP: Glucose 197 (H), Albumin: 3.3 (L), o/w WNL (Creatinine: 0.90)    Lactic acid: 2.0    Interventions:  1816 0.9% Sodium Chloride Bolus, 1L, IV   1835 Zosyn, 4.5 g, IV   1941 Vancomycin, 2500 mg, IV     Emergency Department Course:  Nursing notes and vitals reviewed.     1812  I performed an exam of the patient as documented above.     IV inserted. Medicine administered as documented above. Blood drawn. This was sent to the lab for further testing, results above.    The patient was sent for a LE US while in the emergency department, findings above.     1902 I rechecked the patient " and discussed the results of his workup thus far.     1952  I consulted with Dr. Turpin of the hospitalist services. They are in agreement to accept the patient for admission.    Findings and plan explained to the Patient who consents to admission. Discussed the patient with Dr. Turpin, who will admit the patient to a medical bed for further monitoring, evaluation, and treatment.     Impression & Plan      Medical Decision Making:  Peterson Vazquez is a 58 year old diabetic male who presents for evaluation of left leg redness, swelling, and warmth. This is most consistent with infection/cellulitis. He states to me that he has been having fever and vomiting a couple of days ago, which has resolved, but this morning noted a small amount of redness that has rapidly progressed into his entire left lower leg within a matter of 12 hours. Last time, he was admitted and had an associated small DVT. Labs were overall unremarkable. Lactic is at 2.0. Blood cultures pending. Left lower extremities was negative for DVT. Given the rapid de progressing nature of this, IV antibiotics (Zosyn and Vancomycin) were initiated and I feel that admission is needed at this time. Given the admission, Dr. Cooper was asked to staff this patient with me. Please refer to his note for further details. Dr. Turpin of the hospitalist service was contacted and agrees to admission of this patient to the medical floor. All questions were answered prior to admission. Patient consents to admission.     Diagnosis:    ICD-10-CM   1. Left leg cellulitis L03.116       Disposition:  Admitted to Dr. Turpin    I, Laura Enriquez, am serving as a scribe on 1/18/2019 at 5:59 PM to personally document services performed by Gricel Bond PA-C based on my observations and the provider's statements to me.      Laura Enriquez  1/18/2019    EMERGENCY DEPARTMENT       Gricel Bond PA-C  01/18/19 2044

## 2019-01-18 NOTE — ED NOTES
Bed: FT04  Expected date:   Expected time:   Means of arrival:   Comments:  Triage possible cellulitis

## 2019-01-19 PROBLEM — I89.0 CHRONIC ACQUIRED LYMPHEDEMA: Chronic | Status: ACTIVE | Noted: 2019-01-19

## 2019-01-19 PROBLEM — R89.5 POSITIVE CULTURE FINDING: Status: ACTIVE | Noted: 2019-01-19

## 2019-01-19 LAB
ANION GAP SERPL CALCULATED.3IONS-SCNC: 9 MMOL/L (ref 3–14)
BUN SERPL-MCNC: 16 MG/DL (ref 7–30)
CALCIUM SERPL-MCNC: 8.8 MG/DL (ref 8.5–10.1)
CHLORIDE SERPL-SCNC: 109 MMOL/L (ref 94–109)
CO2 SERPL-SCNC: 23 MMOL/L (ref 20–32)
CREAT SERPL-MCNC: 0.95 MG/DL (ref 0.66–1.25)
ERYTHROCYTE [DISTWIDTH] IN BLOOD BY AUTOMATED COUNT: 12.9 % (ref 10–15)
GFR SERPL CREATININE-BSD FRML MDRD: 87 ML/MIN/{1.73_M2}
GLUCOSE BLDC GLUCOMTR-MCNC: 126 MG/DL (ref 70–99)
GLUCOSE BLDC GLUCOMTR-MCNC: 157 MG/DL (ref 70–99)
GLUCOSE BLDC GLUCOMTR-MCNC: 178 MG/DL (ref 70–99)
GLUCOSE SERPL-MCNC: 199 MG/DL (ref 70–99)
HCT VFR BLD AUTO: 33 % (ref 40–53)
HGB BLD-MCNC: 11.2 G/DL (ref 13.3–17.7)
MCH RBC QN AUTO: 32.3 PG (ref 26.5–33)
MCHC RBC AUTO-ENTMCNC: 33.9 G/DL (ref 31.5–36.5)
MCV RBC AUTO: 95 FL (ref 78–100)
PLATELET # BLD AUTO: 150 10E9/L (ref 150–450)
POTASSIUM SERPL-SCNC: 4.1 MMOL/L (ref 3.4–5.3)
RBC # BLD AUTO: 3.47 10E12/L (ref 4.4–5.9)
SODIUM SERPL-SCNC: 141 MMOL/L (ref 133–144)
WBC # BLD AUTO: 6.4 10E9/L (ref 4–11)

## 2019-01-19 PROCEDURE — 00000146 ZZHCL STATISTIC GLUCOSE BY METER IP

## 2019-01-19 PROCEDURE — 12000000 ZZH R&B MED SURG/OB

## 2019-01-19 PROCEDURE — 25000128 H RX IP 250 OP 636: Performed by: INTERNAL MEDICINE

## 2019-01-19 PROCEDURE — 80048 BASIC METABOLIC PNL TOTAL CA: CPT | Performed by: STUDENT IN AN ORGANIZED HEALTH CARE EDUCATION/TRAINING PROGRAM

## 2019-01-19 PROCEDURE — 85027 COMPLETE CBC AUTOMATED: CPT | Performed by: STUDENT IN AN ORGANIZED HEALTH CARE EDUCATION/TRAINING PROGRAM

## 2019-01-19 PROCEDURE — 25000131 ZZH RX MED GY IP 250 OP 636 PS 637: Performed by: INTERNAL MEDICINE

## 2019-01-19 PROCEDURE — 25000128 H RX IP 250 OP 636: Performed by: STUDENT IN AN ORGANIZED HEALTH CARE EDUCATION/TRAINING PROGRAM

## 2019-01-19 PROCEDURE — 25000132 ZZH RX MED GY IP 250 OP 250 PS 637: Performed by: STUDENT IN AN ORGANIZED HEALTH CARE EDUCATION/TRAINING PROGRAM

## 2019-01-19 PROCEDURE — 36415 COLL VENOUS BLD VENIPUNCTURE: CPT | Performed by: STUDENT IN AN ORGANIZED HEALTH CARE EDUCATION/TRAINING PROGRAM

## 2019-01-19 PROCEDURE — 99232 SBSQ HOSP IP/OBS MODERATE 35: CPT | Performed by: INTERNAL MEDICINE

## 2019-01-19 RX ORDER — DEXTROSE MONOHYDRATE 25 G/50ML
25-50 INJECTION, SOLUTION INTRAVENOUS
Status: DISCONTINUED | OUTPATIENT
Start: 2019-01-19 | End: 2019-01-23 | Stop reason: HOSPADM

## 2019-01-19 RX ORDER — NICOTINE POLACRILEX 4 MG
15-30 LOZENGE BUCCAL
Status: DISCONTINUED | OUTPATIENT
Start: 2019-01-19 | End: 2019-01-23 | Stop reason: HOSPADM

## 2019-01-19 RX ORDER — CEFTRIAXONE 2 G/1
2 INJECTION, POWDER, FOR SOLUTION INTRAMUSCULAR; INTRAVENOUS EVERY 24 HOURS
Status: DISCONTINUED | OUTPATIENT
Start: 2019-01-19 | End: 2019-01-23 | Stop reason: HOSPADM

## 2019-01-19 RX ADMIN — ENOXAPARIN SODIUM 40 MG: 40 INJECTION SUBCUTANEOUS at 10:38

## 2019-01-19 RX ADMIN — LISINOPRIL 10 MG: 10 TABLET ORAL at 21:44

## 2019-01-19 RX ADMIN — VANCOMYCIN HYDROCHLORIDE 2000 MG: 5 INJECTION, POWDER, LYOPHILIZED, FOR SOLUTION INTRAVENOUS at 08:43

## 2019-01-19 RX ADMIN — VANCOMYCIN HYDROCHLORIDE 2000 MG: 5 INJECTION, POWDER, LYOPHILIZED, FOR SOLUTION INTRAVENOUS at 20:10

## 2019-01-19 RX ADMIN — INSULIN ASPART 1 UNITS: 100 INJECTION, SOLUTION INTRAVENOUS; SUBCUTANEOUS at 12:42

## 2019-01-19 RX ADMIN — PIPERACILLIN SODIUM,TAZOBACTAM SODIUM 3.38 G: 3; .375 INJECTION, POWDER, FOR SOLUTION INTRAVENOUS at 05:56

## 2019-01-19 RX ADMIN — CEFTRIAXONE SODIUM 2 G: 2 INJECTION, POWDER, FOR SOLUTION INTRAMUSCULAR; INTRAVENOUS at 21:43

## 2019-01-19 RX ADMIN — PIPERACILLIN SODIUM,TAZOBACTAM SODIUM 3.38 G: 3; .375 INJECTION, POWDER, FOR SOLUTION INTRAVENOUS at 12:42

## 2019-01-19 RX ADMIN — ATORVASTATIN CALCIUM 40 MG: 40 TABLET, FILM COATED ORAL at 21:44

## 2019-01-19 RX ADMIN — PIPERACILLIN SODIUM,TAZOBACTAM SODIUM 3.38 G: 3; .375 INJECTION, POWDER, FOR SOLUTION INTRAVENOUS at 19:15

## 2019-01-19 RX ADMIN — ASPIRIN 81 MG: 81 TABLET, COATED ORAL at 21:44

## 2019-01-19 ASSESSMENT — ACTIVITIES OF DAILY LIVING (ADL)
ADLS_ACUITY_SCORE: 10

## 2019-01-19 ASSESSMENT — MIFFLIN-ST. JEOR: SCORE: 2339.88

## 2019-01-19 NOTE — PLAN OF CARE
PT/lymph: Lymph consult received. Pt must be on antibiotics for 48hrs and see improvement of cellulitis before initiating wrapping. Hold wrapping until 1/20.

## 2019-01-19 NOTE — ED PROVIDER NOTES
ED provider note for seeing patient Peterson Vazquez with Gricel Bond PA-C.    Peterson Vazquez is a 58 year old male who presents with redness and swelling of his left leg. A day or two before, he had some fevers and loose stool and nausea. He had a similar episode a few years ago with the right leg and cellulitis requiring admission. On exam, he has significant edema of both legs and redness of the left leg from the knee down. There is a large crack in the planar skin at the base of the 4th and 5th toes. This is likely the source of infection. Ultrasound by Gricel was negative. Labs are relatively unremarkable. Patient had cultures, started on IV antibiotics, and will be admitted.     Impression:   1. Cellulitis, left leg  2. Underlying Lymphedema      Darrell Cooper MD  01/18/19 6021

## 2019-01-19 NOTE — PLAN OF CARE
Pt A/O x4, VSS on RA. Pt denies pain, N/V and SOB. LLE red, swollen, and warm to the touch. Redness does not exceed border. +4 edema in BLE, ulcers healing. L foot wound, WOC consult placed. IV Vanco and Zosyn administered. PIV infusing. +BS in all four quadrants, passing flatus. Mod carb diet, , insulin administered. Independent in room. ID consult pending. Discharge pending.

## 2019-01-19 NOTE — PROGRESS NOTES
[facility]    Hospitalist Progress Note      Assessment & Plan   Peterson Vazquez is a very pleasant 55-year-old obese male with a history of hyperlipidemia, type 2 diabetes and hypertension, hx of DVT, now presents with left lower extremity cellulitis.  The patient was started on Zosyn and vancomycin in the emergency room.     Cellulitis, left lower extremity  Possibly from laceration of sole of left foot, given patient is diabetic and erythema has rapidly expanded. US Doppler of left leg was negative for DVT.  - wound culture sent from the ED  - continue with vanc and zosyn for now  - ID consulted  - leg elevation  - lymphedema consult  - wound RN consult for laceration on sole of left foot    Type 2 diabetes:   Hold metformin for time chuck. His last A1c was 6.6 in 04/2018.   - continue with Sliding scale insulin     History of hypertension:    - continue PTA lisinopril with parameters.   -  labetalol as needed.     Morbid Obesity: encouraged weight loss     Hyperlipidemia: continue statin      DVT Prophylaxis: Enoxaparin (Lovenox) SQ  Code Status: Full Code    Disposition: Expected discharge in 2-3 days pending improvement in cellulitis  Randell Alexander MD  Text Page  (7am to 6pm)    Interval History   He is afebrile overnight.  He feels the erythema in his lower extremity Appears improved.  Denies nausea, vomiting, abdominal pain, or headache    -Data reviewed today: I reviewed all new labs and imaging results over the last 24 hours. I personally reviewed no images or EKG's today.    Physical Exam   Temp: 97.5  F (36.4  C) Temp src: Oral BP: 136/72 Pulse: 91   Resp: 18 SpO2: 94 % O2 Device: None (Room air)    Vitals:    01/18/19 1759 01/19/19 0620   Weight: 145.2 kg (320 lb) 147.6 kg (325 lb 6.4 oz)     Vital Signs with Ranges  Temp:  [97.5  F (36.4  C)-97.8  F (36.6  C)] 97.5  F (36.4  C)  Pulse:  [89-98] 91  Resp:  [16-18] 18  BP: (136-184)/(72-94) 136/72  SpO2:  [94 %-98 %] 94 %  I/O last  3 completed shifts:  In: 1276 [I.V.:1276]  Out: 700 [Urine:700]    Constitutional: Alert, awake and no apparent distress  Respiratory: Clear to auscultation bilaterally, no wheezing  Cardiovascular: Regular rate and rhythm, no audible murmurs  GI: Soft and nontender  Skin/Integumen: erythema and warmth extending from the knee to the foot on the left. Note of laceration on sole of left foot without surrounding erythema or drainage  Other:  bilateral lower extremity lymphedema    Medications     sodium chloride 100 mL/hr at 01/19/19 0600       aspirin  81 mg Oral At Bedtime     atorvastatin  40 mg Oral At Bedtime     influenza vaccine adult (product based on age)  0.5 mL Intramuscular Prior to discharge     lisinopril  10 mg Oral At Bedtime     piperacillin-tazobactam  3.375 g Intravenous Q6H     vancomycin (VANCOCIN) IV  2,000 mg Intravenous Q12H       Data   Recent Labs   Lab 01/18/19  1818   WBC 7.7   HGB 12.3*   MCV 95         POTASSIUM 3.8   CHLORIDE 104   CO2 24   BUN 19   CR 0.90   ANIONGAP 8   NING 9.3   *   ALBUMIN 3.3*   PROTTOTAL 7.6   BILITOTAL 0.8   ALKPHOS 103   ALT 44   AST 19       Recent Results (from the past 24 hour(s))   US Lower Extremity Venous Duplex Left    Narrative    ULTRASOUND VENOUS LOWER EXTREMITY UNILATERAL LEFT  1/18/2019 7:16 PM     HISTORY: Left leg swelling, redness and warmth. History of past deep  venous thrombosis. Rule out deep venous thrombosis.      COMPARISON: None.    TECHNIQUE: Ultrasound gray scale, Color Doppler flow, and spectral  Doppler waveform analysis performed.    FINDINGS: The left common femoral, superficial femoral, popliteal and  posterior tibial veins are patent and fully compressible and  demonstrate normal venous Doppler flow. The visualized greater  saphenous vein is negative for thrombus. For comparison the right  common femoral vein was evaluated and was unremarkable.      Impression    IMPRESSION: No deep venous thrombosis  demonstrated.    HONG HOYT MD

## 2019-01-19 NOTE — PROGRESS NOTES
RECEIVING UNIT ED HANDOFF REVIEW    ED Nurse Handoff Report was reviewed by: Yue Tapia on January 18, 2019 at 9:23 PM

## 2019-01-19 NOTE — PHARMACY-VANCOMYCIN DOSING SERVICE
Pharmacy Vancomycin Initial Note  Date of Service 2019  Patient's  1960  58 year old, male    Indication: Skin and Soft Tissue Infection    Current estimated CrCl = Estimated Creatinine Clearance: 130.9 mL/min (based on SCr of 0.9 mg/dL).    Creatinine for last 3 days  2019:  6:18 PM Creatinine 0.90 mg/dL    Recent Vancomycin Level(s) for last 3 days  No results found for requested labs within last 72 hours.      Vancomycin IV Administrations (past 72 hours)                   vancomycin (VANCOCIN) 2,500 mg in sodium chloride 0.9 % 500 mL intermittent infusion (mg) 2,500 mg New Bag 19 194                Nephrotoxins and other renal medications (From now, onward)    Start     Dose/Rate Route Frequency Ordered Stop    19 0800  vancomycin (VANCOCIN) 2,000 mg in sodium chloride 0.9 % 500 mL intermittent infusion      2,000 mg  over 2 Hours Intravenous EVERY 12 HOURS 19 2240      19 0030  piperacillin-tazobactam (ZOSYN) 3.375 g vial to attach to  mL bag     Comments:  Pharmacy can adjust dose based on renal function.    3.375 g  over 30 Minutes Intravenous EVERY 6 HOURS 19 2215      19 2230  lisinopril (PRINIVIL/ZESTRIL) tablet 10 mg      10 mg Oral AT BEDTIME 19 221            Contrast Orders - past 72 hours (72h ago, onward)    None                Plan:  1.  Continue vancomycin  2000 mg [~15mg/kg]  IV q12h.   2.  Goal Trough Level: 10-15 mg/L   3.  Pharmacy will check trough levels as appropriate with aggressive dosing, concurrent therapy with piperacillin-tazobactam and in a diabetic patient.   4. Serum creatinine levels will be ordered daily for the first week of therapy and at least twice weekly for subsequent weeks.    5. [place] method utilized to dose vancomycin therapy: Method 2    Julee Cohen

## 2019-01-19 NOTE — PLAN OF CARE
[facility] 636-[facility]  Care plan note: Pt A/O x4. VSS on RA. Lungs clear. BS WNL. Started on IVF at 100ml/hr. SBA.. Pt denies pain/SOB. Left foot healing laceration on bottom of foot. Left leg cellulitis-red, warm, 4+ pitting edema knee to foot, 1 quarter sized healing ulcer on shin. 3-4+pitting edema Right leg, knee to foot, 3 quarter sized healing ulcers on shin. Belongings at bedside and in closet. Admission completed. Nursing continue to monitor.    Inpatient nursing criteria listed below were met:    PCD?s Documented: NA  Skin issues/needs documented :yes  Isolation education started/completed Yes  Patient allergies verified with patient: Yes  Verified completion of [place] Risk Assessment Tool: Yes  Verified completion of Guardianship screening tool: Yes  Fall Prevention: Care plan updated, Education given and documented Yes  Care Plan initiated: Yes  Home medications documented in belongings flowsheet: Yes  Patient belongings documented in [employer] flowsheet: Yes  Reminder note (belongings/ medications) placed in discharge instructions:Yes  Admission profile/ required documentation complete: Yes

## 2019-01-19 NOTE — PHARMACY-ADMISSION MEDICATION HISTORY
Admission medication history interview status for the 1/18/2019  admission is complete. See EPIC admission navigator for prior to admission medications     Medication history source reliability:Good, pt interview    Actions taken by pharmacist (provider contacted, etc):added Mg     Additional medication history information not noted on PTA med list :None    Medication reconciliation/reorder completed by provider prior to medication history? No    Time spent in this activity: 10 minutes    Prior to Admission medications    Medication Sig Last Dose Taking? Auth Provider   aspirin 81 MG EC tablet Take 1 tablet (81 mg) by mouth daily 1/17/2019 at hs Yes Yazmin Pradhan DO   atorvastatin (LIPITOR) 40 MG tablet Take 1 tablet (40 mg) by mouth daily 1/17/2019 at hs Yes Yazmin Pradhan DO   cholecalciferol (VITAMIN D) 1000 UNIT tablet Take 1 tablet (1,000 Units) by mouth daily 1/17/2019 at hs Yes Jazzmine Du MD   Cyanocobalamin (B-12) 1000 MCG TBCR Take 1,000 mcg by mouth daily 1/17/2019 at hs Yes Yazmin Pradhan DO   lisinopril (PRINIVIL/ZESTRIL) 10 MG tablet Take 1 tablet (10 mg) by mouth daily 1/17/2019 at hs Yes Yazmin Pradhan DO   loratadine (CLARITIN) 10 MG tablet Take 10 mg by mouth At Bedtime 1/17/2019 at hs Yes Unknown, Entered By History   magnesium gluconate (MAGONATE) 500 (27 Mg) MG tablet Take 500 mg by mouth At Bedtime 1/17/2019 at hs Yes Unknown, Entered By History   metFORMIN (GLUCOPHAGE-XR) 500 MG 24 hr tablet Take 2 tablets (1,000 mg) by mouth 2 times daily (with meals) 1/17/2019 at hs Yes Yazmin Pradhan DO   minocycline (MINOCIN/DYNACIN) 100 MG capsule TAKE 1 CAPSULE DAILY WITH FULL GLASS OF WATER 1/17/2019 at hs Yes Yazmin Pradhan DO   Multiple Vitamin (DAILY MULTIVITAMIN PO) Take 1 tablet by mouth At Bedtime  1/17/2019 at hs Yes Reported, Patient   Turmeric (RA TURMERIC) 500 MG CAPS Take 1 capsule by mouth daily 1/17/2019 at hs Yes Yazmin Pradhan DO

## 2019-01-19 NOTE — ED NOTES
"[facility]  ED Nurse Handoff Report    ED Chief complaint: Wound Check (left leg cellulitis)      ED Diagnosis:   Final diagnoses:   Left leg cellulitis   Lymphedema of both lower extremities       Code Status: Full Code    Allergies:   Allergies   Allergen Reactions     Fluoxetine Hcl      Prozac: anxiety       Activity level - Baseline/Home:  Independent    Activity Level - Current:   Stand with Assist     Needed?: No    Isolation: No  Infection: Not Applicable  Bariatric?: Yes    Vital Signs:   Vitals:    01/18/19 1759 01/18/19 1945   BP: (!) 184/94 (!) 176/93   Pulse: 98 89   Resp: 16    Temp: 97.8  F (36.6  C)    TempSrc: Oral    SpO2: 98% 97%   Weight: 145.2 kg (320 lb)    Height: 1.854 m (6' 1\")        Cardiac Rhythm: ,        Pain level:      Is this patient confused?: No   Does this patient have a guardian?  No         If yes, is there guardianship documents in the Epic \"Code/ACP\" activity?  N/A         Guardian Notified?  N/A  [place] - Suicide Severity Rating Scale Completed?  Yes  If yes, what color did the patient score?  White    Patient Report: Initial Complaint:  Left leg pain, swelling  Focused Assessment:  Patient has severe lymphedema.  Bilateral lower extremity edema.  Left leg is red, warm to touch, swelling.  Feet are dry & cracked appearing.  Open wound to left foot (plantar area; basically an open linear crack in the skin from in between the 1st & 2nd toe extending down the pad of foot).  Wound culture collected & sent.    Tests Performed:  Labs, US  Abnormal Results:    Labs Ordered and Resulted from Time of ED Arrival Up to the Time of Departure from the ED   CBC WITH PLATELETS DIFFERENTIAL - Abnormal; Notable for the following components:       Result Value    RBC Count 3.81 (*)     Hemoglobin 12.3 (*)     Hematocrit 36.3 (*)     All other components within normal limits   COMPREHENSIVE METABOLIC PANEL - Abnormal; Notable for the following components:    " Glucose 197 (*)     Albumin 3.3 (*)     All other components within normal limits   LACTIC ACID WHOLE BLOOD   NURSING DRAW AND HOLD   WOUND CULTURE AEROBIC BACTERIAL       Treatments provided:  IV insertion.  Zosyn, Vancomycin    Family Comments:  Family with patient.     OBS brochure/video discussed/provided to patient/family: N/A              Name of person given brochure if not patient:               Relationship to patient:     ED Medications:   Medications   vancomycin (VANCOCIN) 2,500 mg in sodium chloride 0.9 % 500 mL intermittent infusion (2,500 mg Intravenous New Bag 1/18/19 1941)   0.9% sodium chloride BOLUS (0 mLs Intravenous Stopped 1/18/19 1856)   piperacillin-tazobactam (ZOSYN) 4.5 g vial to attach to  mL bag (0 g Intravenous Stopped 1/18/19 1915)       Drips infusing?:  Vancomycin    For the majority of the shift this patient was Green.   Interventions performed were monitor, assess.    Severe Sepsis OR Septic Shock Diagnosis Present: No    To be done/followed up on inpatient unit:      ED NURSE PHONE NUMBER: *[phone]

## 2019-01-19 NOTE — PLAN OF CARE
A/O x4. VSS on RA. Lungs clear. BS WNL. SBA.  Denies pain. Left foot healing laceration on bottom of foot. Left leg cellulitis-red, warm, 4+ pitting edema knee to foot, 1 quarter sized healing ulcer on shin. 3-4+pitting edema.  Right leg, knee to foot, 3 quarter sized healing ulcers on shin. Both legs elevated. I & D to consult today.

## 2019-01-19 NOTE — H&P
[facility]    History and Physical - Hospitalist Service       Date of Admission:  1/18/2019    Assessment & Plan     Peterson Vazquez is a very pleasant 55-year-old obese male with a history of hyperlipidemia, type 2 diabetes and hypertension, hx of DVT, now presents with left lower extremity cellulitis.  The patient was started on Zosyn and vancomycin in the emergency room.    1.  Cellulitis, left lower extremity  Possibly from laceration of sole of left foot, given patient is diabetic and erythema has rapidly expanded, we will continue with vancomycin and Zosyn.  His left lower extremity does feel hot and definitely has erythema, possibly strep? But cant rule out MRSA.  The patient did have wound culture collected in the emergency room. US Doppler of left leg was negative for DVT.  Plan:  - Admit to inpatient  - We will continue the current IV antibiotics of Vancomycin and Zosyn (expect he will need 2 days of this), and monitor his erythema for if it extends  - ID consulted  - Follow vitals/temp  - Elevate leg    2. Type 2 diabetes: Hold metformin for time chuck, and he will be on sliding scale as needed.  We will have him on a moderate carbohydrate diet.  His last A1c was 6.6 in 04/2018.     3. History of hypertension:  We will continue the patient's lisinopril with parameters. SBP was elevated to 190s in ED, have labetalol as needed.    4. Morbid Obesity: encouraged weight loss    5. Hyperlipidemia: continue statin tomorrow       Diet: Moderate CHO diet  DVT Prophylaxis: Ambulate every shift  Hall Catheter: not present  Code Status: FULL CODE    Disposition Plan   Expected discharge: 2 - 3 days, recommended to prior living arrangement once antibiotic plan established.  Entered: Henok Denise MD 01/18/2019, 8:17 PM     The patient's care was discussed with the Patient and Patient's Family.    Henok Denise MD  [facility]  Hospital    ______________________________________________________________________    Chief Complaint     Left Leg Redness/Swelling    History is obtained from the patient    History of Present Illness      Peterson Vazquez is a 58 year old male with PMH  of hypertension, hyperlipidemia, morbid obesity, DVT who presents for further evaluation of left leg redness.    Patient reports that he developed fevers and vomiting 3 days ago while at work.  He did improve and vomiting subsided and he had no further fevers, however on day of admission he noticed that his left leg became much more red and swollen compared to his right and was significantly worse in the day previous.  No recent injuries to his leg, though he does endorse that there is a laceration on the sole of his foot that he is not sure where and when this occurred.  He does not endorse any calf pain.  He has no chest pain/shortness of breath.  He has no blood in stool, no urinary complaints.  His last episode of cellulitis was 2 years ago, at that time it was his right leg was infected.  He otherwise has no other complaints at this time.  He is no longer on Eliquis for his DVT 2 years ago.    Review of Systems    The 10 point Review of Systems is negative other than noted in the HPI or here.     Past Medical History    I have reviewed this patient's medical history and updated it with pertinent information if needed.   Past Medical History:   Diagnosis Date     Chronic rhinitis      DVT (deep venous thrombosis) (H)     R peroneal vein in 2016     History of depression 2003     HTN (hypertension) 4/30/2013     Morbid obesity (H)      Other and unspecified hyperlipidemia      Type II or unspecified type diabetes mellitus without mention of complication, not stated as uncontrolled 7-05       Past Surgical History   I have reviewed this patient's surgical history and updated it with pertinent information if needed.  Past Surgical History:   Procedure Laterality  Date     HC TOOTH EXTRACTION W/FORCEP      WISDOM TEETH     TONSILLECTOMY      TONSILS       Social History   I have reviewed this patient's social history and updated it with pertinent information if needed.  Social History     Tobacco Use     Smoking status: Never Smoker     Smokeless tobacco: Never Used   Substance Use Topics     Alcohol use: Yes     Alcohol/week: 0.0 oz     Comment: rarely     Drug use: No       Family History   I have reviewed this patient's family history and updated it with pertinent information if needed.   Family History   Problem Relation Age of Onset     Cancer Mother         CA brain, lung     Cardiovascular Father         CABG @ 74, periph. vas. disease     Alzheimer Disease Father      Chronic Obstructive Pulmonary Disease Father      Cardiovascular Brother         MI     Cancer - colorectal No family hx of      Prostate Cancer No family hx of      Prior to Admission Medications   Prior to Admission Medications   Prescriptions Last Dose Informant Patient Reported? Taking?   Blood Glucose Monitoring Suppl (BLOOD GLUCOSE METER) KIT   No No   Si Device daily   Cyanocobalamin (B-12) 1000 MCG TBCR 2019 at hs  No Yes   Sig: Take 1,000 mcg by mouth daily   Multiple Vitamin (DAILY MULTIVITAMIN PO) 2019 at hs  Yes Yes   Sig: Take 1 tablet by mouth At Bedtime    Turmeric (RA TURMERIC) 500 MG CAPS 2019 at hs  Yes Yes   Sig: Take 1 capsule by mouth daily   aspirin 81 MG EC tablet 2019 at hs  No Yes   Sig: Take 1 tablet (81 mg) by mouth daily   atorvastatin (LIPITOR) 40 MG tablet 2019 at hs  No Yes   Sig: Take 1 tablet (40 mg) by mouth daily   cholecalciferol (VITAMIN D) 1000 UNIT tablet 2019 at hs  Yes Yes   Sig: Take 1 tablet (1,000 Units) by mouth daily   lisinopril (PRINIVIL/ZESTRIL) 10 MG tablet 2019 at hs  No Yes   Sig: Take 1 tablet (10 mg) by mouth daily   loratadine (CLARITIN) 10 MG tablet 2019 at hs  Yes Yes   Sig: Take 10 mg by mouth At Bedtime    magnesium gluconate (MAGONATE) 500 (27 Mg) MG tablet 1/17/2019 at hs  Yes Yes   Sig: Take 500 mg by mouth At Bedtime   metFORMIN (GLUCOPHAGE-XR) 500 MG 24 hr tablet 1/17/2019 at hs  No Yes   Sig: Take 2 tablets (1,000 mg) by mouth 2 times daily (with meals)   minocycline (MINOCIN/DYNACIN) 100 MG capsule 1/17/2019 at hs  No Yes   Sig: TAKE 1 CAPSULE DAILY WITH FULL GLASS OF WATER      Facility-Administered Medications: None     Allergies   Allergies   Allergen Reactions     Fluoxetine Hcl      Prozac: anxiety       Physical Exam   Vital Signs: Temp: 97.8  F (36.6  C) Temp src: Oral BP: (!) 176/93 Pulse: 89   Resp: 16 SpO2: 97 % O2 Device: None (Room air)    Weight: 320 lbs 0 oz    Constitutional: awake, alert, cooperative, no apparent distress, and appears stated age  Eyes: Lids and lashes normal, pupils equal, round and reactive to light, extra ocular muscles intact, sclera clear, conjunctiva normal  ENT: Normocephalic, without obvious abnormality, atraumatic, sinuses nontender on palpation, external ears without lesions, oral pharynx with moist mucous membranes, tonsils without erythema or exudates, gums normal and good dentition.  Hematologic / Lymphatic: no cervical lymphadenopathy  Respiratory: No increased work of breathing, good air exchange, clear to auscultation bilaterally, no crackles or wheezing  Cardiovascular: Normal apical impulse, regular rate and rhythm, normal S1 and S2, no S3 or S4, and no murmur noted  GI: No scars, normal bowel sounds, soft, non-distended, non-tender, no masses palpated, no hepatosplenomegally  Skin: There is swelling, warmth and circumferential erythema of his entire left anterior lower leg.  Musculoskeletal: There is no redness, warmth, or swelling of the joints.  Full range of motion noted.  Motor strength is 5 out of 5 all extremities bilaterally.  Tone is normal.  Neurologic: Awake, alert, oriented to name, place and time.  Cranial nerves II-XII are grossly intact.  Motor  is 5 out of 5 bilaterally.  Neuropsychiatric: normal mood and affect    Data   Data reviewed today: I reviewed all medications, new labs and imaging results over the last 24 hours. I personally reviewed the US  image(s) showing IMPRESSION: No deep venous thrombosis demonstrated..    Recent Labs   Lab 01/18/19  1818   WBC 7.7   HGB 12.3*   MCV 95         POTASSIUM 3.8   CHLORIDE 104   CO2 24   BUN 19   CR 0.90   ANIONGAP 8   NING 9.3   *   ALBUMIN 3.3*   PROTTOTAL 7.6   BILITOTAL 0.8   ALKPHOS 103   ALT 44   AST 19     Recent Results (from the past 24 hour(s))   US Lower Extremity Venous Duplex Left    Narrative    ULTRASOUND VENOUS LOWER EXTREMITY UNILATERAL LEFT  1/18/2019 7:16 PM     HISTORY: Left leg swelling, redness and warmth. History of past deep  venous thrombosis. Rule out deep venous thrombosis.      COMPARISON: None.    TECHNIQUE: Ultrasound gray scale, Color Doppler flow, and spectral  Doppler waveform analysis performed.    FINDINGS: The left common femoral, superficial femoral, popliteal and  posterior tibial veins are patent and fully compressible and  demonstrate normal venous Doppler flow. The visualized greater  saphenous vein is negative for thrombus. For comparison the right  common femoral vein was evaluated and was unremarkable.      Impression    IMPRESSION: No deep venous thrombosis demonstrated.    HONG HOYT MD

## 2019-01-20 ENCOUNTER — APPOINTMENT (OUTPATIENT)
Dept: PHYSICAL THERAPY | Facility: CLINIC | Age: 59
End: 2019-01-20
Payer: COMMERCIAL

## 2019-01-20 PROBLEM — A49.01 STAPH AUREUS INFECTION: Status: ACTIVE | Noted: 2019-01-20

## 2019-01-20 PROBLEM — A49.1 GROUP B STREPTOCOCCAL INFECTION: Status: ACTIVE | Noted: 2019-01-20

## 2019-01-20 LAB
CREAT SERPL-MCNC: 0.95 MG/DL (ref 0.66–1.25)
GFR SERPL CREATININE-BSD FRML MDRD: 87 ML/MIN/{1.73_M2}
GLUCOSE BLDC GLUCOMTR-MCNC: 136 MG/DL (ref 70–99)
GLUCOSE BLDC GLUCOMTR-MCNC: 158 MG/DL (ref 70–99)
GLUCOSE BLDC GLUCOMTR-MCNC: 159 MG/DL (ref 70–99)
GLUCOSE BLDC GLUCOMTR-MCNC: 172 MG/DL (ref 70–99)
GLUCOSE BLDC GLUCOMTR-MCNC: 181 MG/DL (ref 70–99)

## 2019-01-20 PROCEDURE — 36415 COLL VENOUS BLD VENIPUNCTURE: CPT | Performed by: STUDENT IN AN ORGANIZED HEALTH CARE EDUCATION/TRAINING PROGRAM

## 2019-01-20 PROCEDURE — 40000193 ZZH STATISTIC PT WARD VISIT: Performed by: PHYSICAL THERAPIST

## 2019-01-20 PROCEDURE — 97140 MANUAL THERAPY 1/> REGIONS: CPT | Mod: GP | Performed by: PHYSICAL THERAPIST

## 2019-01-20 PROCEDURE — 99232 SBSQ HOSP IP/OBS MODERATE 35: CPT | Performed by: INTERNAL MEDICINE

## 2019-01-20 PROCEDURE — 25000132 ZZH RX MED GY IP 250 OP 250 PS 637: Performed by: INTERNAL MEDICINE

## 2019-01-20 PROCEDURE — 12000000 ZZH R&B MED SURG/OB

## 2019-01-20 PROCEDURE — 97161 PT EVAL LOW COMPLEX 20 MIN: CPT | Mod: GP | Performed by: PHYSICAL THERAPIST

## 2019-01-20 PROCEDURE — 25000128 H RX IP 250 OP 636: Performed by: STUDENT IN AN ORGANIZED HEALTH CARE EDUCATION/TRAINING PROGRAM

## 2019-01-20 PROCEDURE — 25000128 H RX IP 250 OP 636: Performed by: INTERNAL MEDICINE

## 2019-01-20 PROCEDURE — 25000132 ZZH RX MED GY IP 250 OP 250 PS 637: Performed by: STUDENT IN AN ORGANIZED HEALTH CARE EDUCATION/TRAINING PROGRAM

## 2019-01-20 PROCEDURE — 00000146 ZZHCL STATISTIC GLUCOSE BY METER IP

## 2019-01-20 PROCEDURE — 82565 ASSAY OF CREATININE: CPT | Performed by: STUDENT IN AN ORGANIZED HEALTH CARE EDUCATION/TRAINING PROGRAM

## 2019-01-20 RX ORDER — METFORMIN HCL 500 MG
1000 TABLET, EXTENDED RELEASE 24 HR ORAL 2 TIMES DAILY WITH MEALS
Status: DISCONTINUED | OUTPATIENT
Start: 2019-01-20 | End: 2019-01-23 | Stop reason: HOSPADM

## 2019-01-20 RX ADMIN — SODIUM CHLORIDE: 9 INJECTION, SOLUTION INTRAVENOUS at 02:13

## 2019-01-20 RX ADMIN — METFORMIN HYDROCHLORIDE 1000 MG: 500 TABLET, EXTENDED RELEASE ORAL at 21:41

## 2019-01-20 RX ADMIN — INSULIN ASPART 1 UNITS: 100 INJECTION, SOLUTION INTRAVENOUS; SUBCUTANEOUS at 13:09

## 2019-01-20 RX ADMIN — ASPIRIN 81 MG: 81 TABLET, COATED ORAL at 21:41

## 2019-01-20 RX ADMIN — LISINOPRIL 10 MG: 10 TABLET ORAL at 21:41

## 2019-01-20 RX ADMIN — CEFTRIAXONE SODIUM 2 G: 2 INJECTION, POWDER, FOR SOLUTION INTRAMUSCULAR; INTRAVENOUS at 21:46

## 2019-01-20 RX ADMIN — INSULIN ASPART 1 UNITS: 100 INJECTION, SOLUTION INTRAVENOUS; SUBCUTANEOUS at 09:41

## 2019-01-20 RX ADMIN — ENOXAPARIN SODIUM 40 MG: 40 INJECTION SUBCUTANEOUS at 09:41

## 2019-01-20 RX ADMIN — ATORVASTATIN CALCIUM 40 MG: 40 TABLET, FILM COATED ORAL at 21:41

## 2019-01-20 RX ADMIN — ENOXAPARIN SODIUM 40 MG: 40 INJECTION SUBCUTANEOUS at 21:42

## 2019-01-20 ASSESSMENT — ACTIVITIES OF DAILY LIVING (ADL)
ADLS_ACUITY_SCORE: 10

## 2019-01-20 ASSESSMENT — MIFFLIN-ST. JEOR: SCORE: 2333.88

## 2019-01-20 NOTE — PLAN OF CARE
Pt A/O x4, VSS on RA. Pt denies pain, N/V and SOB. LLE red, swollen, and warm to the touch. Redness receeding. +4 edema in BLE, ulcers healing. L foot wound, WOC consult placed. PIV SL. +BS in all four quadrants, passing flatus. Mod carb diet, /181, insulin administered. Independent in room. ID following. Discharge pending.

## 2019-01-20 NOTE — PROGRESS NOTES
A/Ox4. VSS. RA. IND. . CHO diet. BLE elevated; red; sandhya, warm; cellulitis marked and receeding; 4 Edema. Pt denies pain, SOB, chest pain, N/V. Lymph and WOC to see pt. Contact precautions maintained. NS @100. CPAP @night. Discharge in 2-3 days pending improved cellulitis.

## 2019-01-20 NOTE — CONSULTS
Note INFECTIOUS DISEASES CONSULTATION NOTE  Covering for Olimpia Gaspar & Alber     Date 2019     Name /  Peterson Vazquez   1960     MRN [id number]       Thank you for asking us to see Peterson MACDONALD George for infectious diseases evaluation    REASON FOR CONSULT Cellulitis L leg  REQUESTING PROVIDER Dr Denise    CHIEF COMPLAINT    Acute onset w rapid progression redness LEFT lower leg  HPI  55-year-old obese male with a history of hyperlipidemia, type 2 diabetes and hypertension, hx of DVT, chronic lymphedema legs and cellulitis RIGHT lower leg in past    now presents with left lower extremity cellulitis.    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    1.15 Short-lived emesis (a coworker in [place] w him prior week ill recently w GI illness)  1.18 Acute onset redness / swelling L lower leg  Rapid progression of redness up leg  Rigors  Tenderness in LEFT groin    To ER => admitted => broad spectrum antibiotics    1.19 Better, no more rigors, some receding of redness, groin no longer tender    No dysuria  No more nausea  No emesis  No diarrhea  No cough  No shortness of breath    Remainder of 17 pt ROS neg     Infection Risk Factors    yes Animal / bird exposure   Bueno retriever dog   no Travel / residence location      no Environmental exposure      no Bites (mosquito/tick)      no Ill contacts        OTHER HISTORY    PFSH  Past Medical History:   Diagnosis Date     Chronic rhinitis      DVT (deep venous thrombosis) (H)     R peroneal vein in 2016     History of depression      HTN (hypertension) 2013     Morbid obesity (H)      Other and unspecified hyperlipidemia      Type II or unspecified type diabetes mellitus without mention of complication, not stated as uncontrolled 7-05     Past Surgical History:   Procedure Laterality Date     HC TOOTH EXTRACTION W/FORCEP      WISDOM  "TEETH     TONSILLECTOMY      TONSILS      Problem (# of Occurrences) Relation (Name,Age of Onset)    Alzheimer Disease (1) Father    Cancer (1) Mother: CA brain, lung    Cardiovascular (2) Father: CABG @ 74, periph. vas. disease, Brother: MI    Chronic Obstructive Pulmonary Disease (1) Father       Negative family history of: Cancer - colorectal, Prostate Cancer        Family History   Problem Relation Age of Onset     Cancer Mother         CA brain, lung     Cardiovascular Father         CABG @ 74, periph. vas. disease     Alzheimer Disease Father      Chronic Obstructive Pulmonary Disease Father      Cardiovascular Brother         MI     Cancer - colorectal No family hx of      Prostate Cancer No family hx of      Social History     Socioeconomic History     Marital status:      Spouse name: elena   Occupational History     Occupation:    Tobacco Use     Smoking status: Never Smoker     Smokeless tobacco: Never Used   Substance and Sexual Activity     Alcohol use: Yes     Alcohol/week: 0.0 oz     Comment: rarely     Drug use: No     Sexual activity: Yes     Partners: Female     Allergies   Allergen Reactions     Fluoxetine Hcl      Prozac: anxiety     Immunization History   Administered Date(s) Administered     Influenza (IIV3) PF 01/30/2013, 10/15/2013     Influenza Vaccine IM 3yrs+ 4 Valent IIV4 10/03/2014, 12/15/2015     Pneumococcal 23 valent 05/07/2015     TD (ADULT, 7+) 02/01/2008     EXAM  /72 (BP Location: Left arm)   Pulse 95   Temp 99.7  F (37.6  C) (Oral)   Resp 17   Ht 1.854 m (6' 1\")   Wt 147.6 kg (325 lb 6.4 oz)   SpO2 96%   BMI 42.93 kg/m      constitution   In bed  no acute distress     lungs   Clear  No cough     neuro   Loquacious  Normal speech / conversation  Moves all limbs     extrem   3+ edema both legs  Laceration / skin crack plantar L foot 4th-5th web     skin   bilat lower leg hyperpigmentation  LEFT lower leg erythema (smaller area now than marked by line " "recently drawn on skin)     abd   Large / soft   CV   No murmers   HEENT   Grossly normal appearance       DATA  Cultures  1-18 L foot  GPC (no ID yet)    LABS  Recent Labs   Lab Test 01/19/19  0923 01/18/19  1818 04/27/18  1027   WBC 6.4 7.7 6.3   AST  --  19 22   ALT  --  44 48   BILITOTAL  --  0.8 0.6   ALKPHOS  --  103 102           ASSESSMENT   SUGGESTIONS    L03.116 Left leg cellulitis  I89.0  Lymphedema of both lower extremities  S91.302A Open wound of left foot, initial encounter    Chronic Acquired Lymphedema   Positive Culture Finding   Story most c/w Streptococcal cellulitis       - acute onset       - rapid progression       - associated w proximal lymphadenitis  No indication systemic infection  May have \"originated\" from skin defect plantar LEFT foot by 4th-5th interdig webspace    I think ceftriaxone       - best for beta-hemolytic strep       - if improves, easy transition to enteral antibiotics       - plan on ~ 10-day course  F/u ID of the Jan 18 culture (suspect Strep, but cannot 100 % Staph ...)       - if beta strep and improves, eventual transition to enteral amoxicillin (1,000 mg po TID)       - if MSSA, then cephalexin        - if MRSA, then iv vanco transition to enteral TMP-S or linezolid   D/w pt future       - minimize edema       - meticulous care skin / toenails       - if fever AND RIGORS - get to ER ASAP         Montrell Torres MD  Covering for Olimpia Harrison & Alber & Hubert  [facility] Infectious Diseases  [phone]    Prescription Medications as of 1/19/2019       Rx Number Disp Refills Start End Last Dispensed Date Next Fill Date Owning Pharmacy    aspirin 81 MG EC tablet  90 tablet 3 4/27/2018    [facility] - [address]    Sig: Take 1 tablet (81 mg) by mouth daily    Class: OTC    Route: Oral    atorvastatin (LIPITOR) 40 MG tablet  90 tablet 3 4/27/2018    [facility] - [address]" [facility]    Sig: Take 1 tablet (40 mg) by mouth daily    Class: E-Prescribe    Route: Oral    Blood Glucose Monitoring Suppl (BLOOD GLUCOSE METER) KIT  1 kit 2 3/19/2014    [facility] - [address]    Si Device daily    Notes to Pharmacy: Give glucose device, strips, lancets etc for 1 year.  Tests daily    Route: Does not apply    cholecalciferol (VITAMIN D) 1000 UNIT tablet  100 tablet 3         Sig: Take 1 tablet (1,000 Units) by mouth daily    Class: Historical    Route: Oral    Cyanocobalamin (B-12) 1000 MCG TBCR  100 tablet 1 2015    [facility] - [address]    Sig: Take 1,000 mcg by mouth daily    Class: OTC    Route: Oral    lisinopril (PRINIVIL/ZESTRIL) 10 MG tablet  90 tablet 3 2018    [facility] [address]    Sig: Take 1 tablet (10 mg) by mouth daily    Class: E-Prescribe    Route: Oral    loratadine (CLARITIN) 10 MG tablet            Sig: Take 10 mg by mouth At Bedtime    Class: Historical    Route: Oral    magnesium gluconate (MAGONATE) 500 (27 Mg) MG tablet            Sig: Take 500 mg by mouth At Bedtime    Class: Historical    Route: Oral    metFORMIN (GLUCOPHAGE-XR) 500 MG 24 hr tablet  360 tablet 3 2018    [facility] - [address]    Sig: Take 2 tablets (1,000 mg) by mouth 2 times daily (with meals)    Class: E-Prescribe    Route: Oral    minocycline (MINOCIN/DYNACIN) 100 MG capsule  90 capsule 3 2018    [facility] - [address]    Sig: TAKE 1 CAPSULE DAILY WITH FULL GLASS OF WATER    Class: E-Prescribe    Multiple Vitamin (DAILY MULTIVITAMIN PO)            Sig: Take 1 tablet by mouth At Bedtime     Class: Historical    Route: Oral    Turmeric (RA TURMERIC) 500 MG CAPS    2018        Sig: Take 1 capsule by mouth daily    Class: Historical  "   Route: Oral      Hospital Medications as of 1/19/2019       Dose Frequency Start End    aspirin EC tablet 81 mg 81 mg AT BEDTIME 1/18/2019     Sig: Take 1 tablet (81 mg) by mouth At Bedtime    Class: E-Prescribe    Route: Oral    atorvastatin (LIPITOR) tablet 40 mg 40 mg AT BEDTIME 1/18/2019     Sig: Take 1 tablet (40 mg) by mouth At Bedtime    Class: E-Prescribe    Route: Oral    dextrose 50 % injection 25-50 mL 25-50 mL EVERY 15 MIN PRN 1/19/2019     Sig: Inject 25-50 mLs into the vein every 15 minutes as needed for low blood sugar    Class: E-Prescribe    Route: Intravenous    Linked Group 1:  \"Or\" Linked Group Details        enoxaparin (LOVENOX) injection 40 mg 40 mg EVERY 24 HOURS 1/19/2019     Sig: Inject 0.4 mLs (40 mg) Subcutaneous every 24 hours    Class: E-Prescribe    Route: Subcutaneous    glucagon injection 1 mg 1 mg EVERY 15 MIN PRN 1/19/2019     Sig: Inject 1 mg Subcutaneous every 15 minutes as needed for low blood sugar (May repeat x 1 only)    Class: E-Prescribe    Route: Subcutaneous    Linked Group 1:  \"Or\" Linked Group Details        glucose gel 15-30 g 15-30 g EVERY 15 MIN PRN 1/19/2019     Sig: Take 15-30 g by mouth every 15 minutes as needed for low blood sugar    Class: E-Prescribe    Route: Oral    Linked Group 1:  \"Or\" Linked Group Details        influenza recomb quadrivalent PF (FLUBLOK) injection 0.5 mL 0.5 mL PRIOR TO DISCHARGE 1/19/2019     Sig: Inject 0.5 mLs into the muscle Prior to discharge    Class: E-Prescribe    Route: Intramuscular    insulin aspart (NovoLOG) inj (RAPID ACTING) 1-7 Units 3 TIMES DAILY BEFORE MEALS 1/19/2019     Sig: Inject 1-7 Units Subcutaneous 3 times daily (before meals)    Class: E-Prescribe    Route: Subcutaneous    insulin aspart (NovoLOG) inj (RAPID ACTING) 1-5 Units AT BEDTIME 1/19/2019     Sig: Inject 1-5 Units Subcutaneous At Bedtime    Class: E-Prescribe    Route: Subcutaneous    labetalol (NORMODYNE/TRANDATE) injection 10 mg 10 mg EVERY 4 HOURS " PRN 1/18/2019     Sig: Inject 2 mLs (10 mg) into the vein every 4 hours as needed for high blood pressure (give for SBP > 180)    Class: E-Prescribe    Route: Intravenous    lisinopril (PRINIVIL/ZESTRIL) tablet 10 mg 10 mg AT BEDTIME 1/18/2019     Sig: Take 1 tablet (10 mg) by mouth At Bedtime    Class: E-Prescribe    Route: Oral    melatonin tablet 1 mg 1 mg AT BEDTIME PRN 1/18/2019     Sig: Take 1 tablet (1 mg) by mouth nightly as needed for sleep    Class: E-Prescribe    Route: Oral    naloxone (NARCAN) injection 0.1-0.4 mg 0.1-0.4 mg EVERY 2 MIN PRN 1/18/2019     Sig: Inject 0.25-1 mLs (0.1-0.4 mg) into the vein every 2 minutes as needed for opioid reversal    Class: E-Prescribe    Route: Intravenous    piperacillin-tazobactam (ZOSYN) 3.375 g vial to attach to  mL bag 3.375 g EVERY 6 HOURS 1/19/2019     Sig: Inject 3.375 g into the vein every 6 hours    Class: E-Prescribe    Notes to Pharmacy: Pharmacy can adjust dose based on renal function.    Route: Intravenous    sodium chloride 0.9% infusion  CONTINUOUS 1/18/2019     Sig: Inject into the vein continuous    Class: E-Prescribe    Route: Intravenous    vancomycin (VANCOCIN) 2,000 mg in sodium chloride 0.9 % 500 mL intermittent infusion 2,000 mg EVERY 12 HOURS 1/19/2019     Sig: Inject 2,000 mg into the vein every 12 hours    Class: E-Prescribe    Route: Intravenous    vancomycin (VANCOCIN) 2,500 mg in sodium chloride 0.9 % 500 mL intermittent infusion 2,500 mg ONCE 1/18/2019 1/18/2019    Sig: Inject 2,500 mg into the vein once    Class: E-Prescribe    Route: Intravenous

## 2019-01-20 NOTE — PROGRESS NOTES
" 01/20/19 1700   Quick Adds   Type of Visit Initial PT Evaluation   Living Environment   Lives With spouse   Living Arrangements house   Home Accessibility stairs within home;stairs to enter home   Living Environment Comment Drives, work full time, no exercise regular, but does like to walk   Self-Care   Usual Activity Tolerance good   Current Activity Tolerance good   Equipment Currently Used at Home raised toilet;grab bar, toilet   Functional Level Prior   Ambulation 0-->independent   Transferring 0-->independent   Toileting 0-->independent   Bathing 0-->independent   Communication 0-->understands/communicates without difficulty   Swallowing 0-->swallows foods/liquids without difficulty   Cognition 0 - no cognition issues reported   Fall history within last six months yes   Number of times patient has fallen within last six months 1   Which of the above functional risks had a recent onset or change? (impaired activity tolerance from baseline)   Prior Functional Level Comment \"I took a header trying to move the  down the yard, I landed on my chest.\"   General Information   Onset of Illness/Injury or Date of Surgery - Date 01/18/19   Referring Physician Randell Alexander MD    Patient/Family Goals Statement Home in the next 1-2 days   Pertinent History of Current Problem (include personal factors and/or comorbidities that impact the POC) 57 yo male adm with fever and chills, and L LE pain. Found to have cellulitis, LLE red \"balloning up\" NOw here 48 hrs with antibiotics. States he has a similar episode in the past with the R LE.    General Info Comments States  \"I will never wait to come in again.\"   Edema General Information   Onset of Edema 01/01/14   Affected Body Part(s) Left LE;Right LE   Edema Etiology Insidious   Etiology Comments States he did have some cellulitis in the summer of 2015. States he did have B LE swelling.    Edema Precautions Acute infection   Edema Precaution Comments " "Cellulitis on the L, per pt MD reports likel y Staph infedtion   General Comments/Previous Edema Treatment/Edema Equipment States the LEs do have reduction in the AM, usually the R LE is larger than the L. However, tis presentation is the L>R.    Edema Examination/Assessment   Skin Condition Pitting;Non-pitting;Temperature;Dryness;Hemosiderin deposits   Skin Condition Comments L LE Red, warm, greater in size than the R. Medial L lower leg, eraser size deep purple spot, blood blister lookiiing. \"deep\" cut over platar surface of the foot, just below the 4th and 5th digets, vertical incision about 1\" in length. Distal 1/3 of the shin down with slight dry, catalino size round dry spot. Pt states it somtimes s\"scabs\".  R LE 3 small red, scabbed areas ranging in dime to catalino width. R lateral distal eraser size blanchable pink spot noted.   Scar Yes  (Just distal to the knee, R, scar from a \"baseball injury.\")   Dorsal Pedal Pulse Decreased   Stemmer Sign Positive   Skin Integrity Dry, thin over the shins, thick course over the plantar surface. thick yellowish nails.    Pitting Assessment Dorsum of the feet to 2/3rds up the shin 2+ pitting, Dorsum of the feet puffy, minimal contours over the malioli   Cognitive Status Examination   Orientation orientation to person, place and time   Level of Consciousness alert   Follows Commands and Answers Questions 100% of the time;able to follow multistep instructions   Personal Safety and Judgment intact   Pain Assessment   Patient Currently in Pain No   Integumentary/Edema   Integumentary/Edema Comments See above.   Posture    Posture Forward head position;Protracted shoulders;Kyphosis   Range of Motion (ROM)   ROM Comment B UEs and LEs WFL    Strength   Strength Comments Demostrates antigravity hands   Bed Mobility   Bed Mobility Comments independent   Transfer Skills   Transfer Comments independent   Gait   Gait Comments independent   Sensory Examination   Sensory Perception " "Comments Impaired B LE sensation secondary to DM neuropathy   General Therapy Interventions   Edema: Planned Interventions Gradient compression bandaging;Edema exercises;Education;Manual therapy;Precautions to prevent infection/exacerbation   Clinical Impression   Criteria for Skilled Therapeutic Intervention yes, treatment indicated   PT Diagnosis Increaead B LE edema from baseline   Edema: Patient Presentation Stage 1 Lymphedema   Influenced by the following impairments In need of gradient compression bandaging and education for implcation   Functional limitations due to impairments Decreased functional mobility   Clinical Presentation Stable/Uncomplicated   Clinical Presentation Rationale see MR   Clinical Decision Making (Complexity) Low complexity   Therapy Frequency` 3 times/week   Predicted Duration of Therapy Intervention (days/wks) 3 dasy   Anticipated Discharge Disposition Home with Outpatient Therapy   Risk & Benefits of therapy have been explained Yes   Patient, Family & other staff in agreement with plan of care Yes   Clinical Impression Comments Needs OP Lymphedema to progress to some type of stocking for longterm use., would benefit from some manual lymph drainage   PT G-Codes   G-code Mobility: Walking and moving around   [facility] AM-PAC  \"6 Clicks\" V.2 Basic Mobility Inpatient Short Form   1. Turning from your back to your side while in a flat bed without using bedrails? 4 - None   2. Moving from lying on your back to sitting on the side of a flat bed without using bedrails? 4 - None   3. Moving to and from a bed to a chair (including a wheelchair)? 4 - None   4. Standing up from a chair using your arms (e.g., wheelchair, or bedside chair)? 4 - None   5. To walk in hospital room? 4 - None   6. Climbing 3-5 steps with a railing? 4 - None   Basic Mobility Raw Score (Score out of 24.Lower scores equate to lower levels of function) 24   Total Evaluation Time   Total Evaluation Time (Minutes) 15 "

## 2019-01-20 NOTE — PLAN OF CARE
Pt A&Ox4. VSS, afebrile with BP in the 140's. On RA. Good appetite and oral intake. , 157. Pt ate food brought in by spouse. Voiding appropriately, reported BM this AM. NS running at 100ml/hr. Independent in room. Calls as needed. Contact precautions maintained, pending cultures. ID saw pt and discontinued Vanco and Zoysn. Started Ceftriaxone Q24. Redness on outlined LLE receeding from borders. Pt denies pain. BLE with +3-4 edema with LLE more firm, good sensations. Calls as needed. Using home Cpap at night.

## 2019-01-20 NOTE — PROGRESS NOTES
[facility]    Hospitalist Progress Note      Assessment & Plan   Peterson Vazquez is a very pleasant 55-year-old obese male with a history of hyperlipidemia, type 2 diabetes and hypertension, hx of DVT, now presents with left lower extremity cellulitis.         Cellulitis, left lower extremity  Possibly from laceration of sole of left foot, given patient is diabetic and erythema has rapidly expanded. US Doppler of left leg was negative for DVT.  - wound culture swabbed from left plantar foot sent from ED-group B strep and staph species   - vanc/zosyn changed to Ceftriaxone per ID--will continue  - ID consulted, appreciate assitance  - leg elevation  - lymphedema consult  - slowly improving, I think he will benefit from more IV antibiotics    Type 2 diabetes:   His last A1c was 6.6 in 04/2018.   - continue with Sliding scale insulin  - resume Metformin     History of hypertension:    - continue PTA lisinopril with parameters.   -  labetalol as needed.     Morbid Obesity: encouraged weight loss     Hyperlipidemia: continue statin      DVT Prophylaxis: Enoxaparin (Lovenox) SQ  Code Status: Full Code    Disposition: Expected discharge in 1-3 days pending improvement in cellulitis    Randell Alexander MD  Text Page  (7am to 6pm)    Interval History   Tmax 99.1.  Erythema still present but appears to be receding the marking.   Denies nausea, vomiting, abdominal pain, or headache    -Data reviewed today: I reviewed all new labs and imaging results over the last 24 hours. I personally reviewed no images or EKG's today.    Physical Exam   Temp: 99.1  F (37.3  C) Temp src: Oral BP: 149/83 Pulse: 89   Resp: 17 SpO2: 95 % O2 Device: None (Room air)    Vitals:    01/18/19 1759 01/19/19 0620 01/20/19 0700   Weight: 145.2 kg (320 lb) 147.6 kg (325 lb 6.4 oz) 147 kg (324 lb 1.2 oz)     Vital Signs with Ranges  Temp:  [98.4  F (36.9  C)-99.7  F (37.6  C)] 99.1  F (37.3  C)  Pulse:  [87-95] 89  Resp:  [17-18]  17  BP: (144-151)/(72-84) 149/83  SpO2:  [94 %-96 %] 95 %  I/O last 3 completed shifts:  In: 1143 [P.O.:720; I.V.:423]  Out: 800 [Urine:800]    Constitutional: Alert, awake and no apparent distress  Respiratory: Clear to auscultation bilaterally, no wheezing  Cardiovascular: Regular rate and rhythm, no audible murmurs  GI: Soft and nontender  Skin/Integumen: erythema and warmth on the left leg has started receeding the marking, but still has significant erythema. Note of laceration on sole of left foot without surrounding erythema or drainage  Other:  bilateral lower extremity lymphedema    Medications       aspirin  81 mg Oral At Bedtime     atorvastatin  40 mg Oral At Bedtime     cefTRIAXone  2 g Intravenous Q24H     enoxaparin  40 mg Subcutaneous Q24H     influenza vaccine adult (product based on age)  0.5 mL Intramuscular Prior to discharge     insulin aspart  1-7 Units Subcutaneous TID AC     insulin aspart  1-5 Units Subcutaneous At Bedtime     lisinopril  10 mg Oral At Bedtime       Data   Recent Labs   Lab 01/20/19  0819 01/19/19  0923 01/18/19  1818   WBC  --  6.4 7.7   HGB  --  11.2* 12.3*   MCV  --  95 95   PLT  --  150 177   NA  --  141 136   POTASSIUM  --  4.1 3.8   CHLORIDE  --  109 104   CO2  --  23 24   BUN  --  16 19   CR 0.95 0.95 0.90   ANIONGAP  --  9 8   NING  --  8.8 9.3   GLC  --  199* 197*   ALBUMIN  --   --  3.3*   PROTTOTAL  --   --  7.6   BILITOTAL  --   --  0.8   ALKPHOS  --   --  103   ALT  --   --  44   AST  --   --  19       No results found for this or any previous visit (from the past 24 hour(s)).

## 2019-01-21 ENCOUNTER — APPOINTMENT (OUTPATIENT)
Dept: PHYSICAL THERAPY | Facility: CLINIC | Age: 59
End: 2019-01-21
Payer: COMMERCIAL

## 2019-01-21 LAB
BACTERIA SPEC CULT: ABNORMAL
CREAT SERPL-MCNC: 0.9 MG/DL (ref 0.66–1.25)
ERYTHROCYTE [DISTWIDTH] IN BLOOD BY AUTOMATED COUNT: 12.6 % (ref 10–15)
GFR SERPL CREATININE-BSD FRML MDRD: >90 ML/MIN/{1.73_M2}
GLUCOSE BLDC GLUCOMTR-MCNC: 141 MG/DL (ref 70–99)
GLUCOSE BLDC GLUCOMTR-MCNC: 152 MG/DL (ref 70–99)
GLUCOSE BLDC GLUCOMTR-MCNC: 167 MG/DL (ref 70–99)
GLUCOSE BLDC GLUCOMTR-MCNC: 172 MG/DL (ref 70–99)
GLUCOSE BLDC GLUCOMTR-MCNC: 204 MG/DL (ref 70–99)
HBA1C MFR BLD: 6.7 % (ref 0–5.6)
HCT VFR BLD AUTO: 33.3 % (ref 40–53)
HGB BLD-MCNC: 11.1 G/DL (ref 13.3–17.7)
Lab: ABNORMAL
MCH RBC QN AUTO: 31.5 PG (ref 26.5–33)
MCHC RBC AUTO-ENTMCNC: 33.3 G/DL (ref 31.5–36.5)
MCV RBC AUTO: 95 FL (ref 78–100)
PLATELET # BLD AUTO: 170 10E9/L (ref 150–450)
RBC # BLD AUTO: 3.52 10E12/L (ref 4.4–5.9)
SPECIMEN SOURCE: ABNORMAL
WBC # BLD AUTO: 8.7 10E9/L (ref 4–11)

## 2019-01-21 PROCEDURE — 25000132 ZZH RX MED GY IP 250 OP 250 PS 637: Performed by: STUDENT IN AN ORGANIZED HEALTH CARE EDUCATION/TRAINING PROGRAM

## 2019-01-21 PROCEDURE — 83036 HEMOGLOBIN GLYCOSYLATED A1C: CPT | Performed by: STUDENT IN AN ORGANIZED HEALTH CARE EDUCATION/TRAINING PROGRAM

## 2019-01-21 PROCEDURE — 97140 MANUAL THERAPY 1/> REGIONS: CPT | Mod: GP | Performed by: PHYSICAL THERAPIST

## 2019-01-21 PROCEDURE — 25000128 H RX IP 250 OP 636: Performed by: INTERNAL MEDICINE

## 2019-01-21 PROCEDURE — 99232 SBSQ HOSP IP/OBS MODERATE 35: CPT | Performed by: PHYSICIAN ASSISTANT

## 2019-01-21 PROCEDURE — 00000146 ZZHCL STATISTIC GLUCOSE BY METER IP

## 2019-01-21 PROCEDURE — 40000193 ZZH STATISTIC PT WARD VISIT: Performed by: PHYSICAL THERAPIST

## 2019-01-21 PROCEDURE — 36415 COLL VENOUS BLD VENIPUNCTURE: CPT | Performed by: STUDENT IN AN ORGANIZED HEALTH CARE EDUCATION/TRAINING PROGRAM

## 2019-01-21 PROCEDURE — G0463 HOSPITAL OUTPT CLINIC VISIT: HCPCS

## 2019-01-21 PROCEDURE — 82565 ASSAY OF CREATININE: CPT | Performed by: STUDENT IN AN ORGANIZED HEALTH CARE EDUCATION/TRAINING PROGRAM

## 2019-01-21 PROCEDURE — 25000132 ZZH RX MED GY IP 250 OP 250 PS 637: Performed by: INTERNAL MEDICINE

## 2019-01-21 PROCEDURE — 40000901 ZZH STATISTIC WOC PT EDUCATION, 0-15 MIN

## 2019-01-21 PROCEDURE — 85027 COMPLETE CBC AUTOMATED: CPT | Performed by: STUDENT IN AN ORGANIZED HEALTH CARE EDUCATION/TRAINING PROGRAM

## 2019-01-21 PROCEDURE — 12000000 ZZH R&B MED SURG/OB

## 2019-01-21 PROCEDURE — 97602 WOUND(S) CARE NON-SELECTIVE: CPT

## 2019-01-21 RX ADMIN — ASPIRIN 81 MG: 81 TABLET, COATED ORAL at 21:15

## 2019-01-21 RX ADMIN — ATORVASTATIN CALCIUM 40 MG: 40 TABLET, FILM COATED ORAL at 21:12

## 2019-01-21 RX ADMIN — ENOXAPARIN SODIUM 40 MG: 40 INJECTION SUBCUTANEOUS at 21:13

## 2019-01-21 RX ADMIN — INSULIN ASPART 1 UNITS: 100 INJECTION, SOLUTION INTRAVENOUS; SUBCUTANEOUS at 13:34

## 2019-01-21 RX ADMIN — ENOXAPARIN SODIUM 40 MG: 40 INJECTION SUBCUTANEOUS at 10:44

## 2019-01-21 RX ADMIN — CEFTRIAXONE SODIUM 2 G: 2 INJECTION, POWDER, FOR SOLUTION INTRAMUSCULAR; INTRAVENOUS at 21:08

## 2019-01-21 RX ADMIN — METFORMIN HYDROCHLORIDE 1000 MG: 500 TABLET, EXTENDED RELEASE ORAL at 21:13

## 2019-01-21 RX ADMIN — METFORMIN HYDROCHLORIDE 1000 MG: 500 TABLET, EXTENDED RELEASE ORAL at 10:44

## 2019-01-21 RX ADMIN — INSULIN ASPART 1 UNITS: 100 INJECTION, SOLUTION INTRAVENOUS; SUBCUTANEOUS at 19:27

## 2019-01-21 RX ADMIN — LISINOPRIL 10 MG: 10 TABLET ORAL at 21:12

## 2019-01-21 RX ADMIN — INSULIN ASPART 1 UNITS: 100 INJECTION, SOLUTION INTRAVENOUS; SUBCUTANEOUS at 09:36

## 2019-01-21 ASSESSMENT — ACTIVITIES OF DAILY LIVING (ADL)
ADLS_ACUITY_SCORE: 10

## 2019-01-21 ASSESSMENT — MIFFLIN-ST. JEOR: SCORE: 2193.88

## 2019-01-21 NOTE — PROGRESS NOTES
[facility] Nurse Inpatient Wound Assessment     }Assessment of wound(s) on pt's:   Lt plantar fissue-type wound (5th met head area)        Data:   Patient History:       Dale Assessment and sub scores:  Dale Risk Assessment    Sensory Perception: 4-->no impairment    Moisture: 4-->rarely moist   Activity: 4-->walks frequently     Mobility: 4-->no limitation   Nutrition: 3-->adequate     Dale Score: 22       Positioning: per self    Mattress:  Atmos air      Moisture Management:  Independent, no drainage from BLE noted    Catheter secured? NA      Current Diet / Nutrition:     Moderate Consistent CHO Diet    Labs:   Recent Labs   Lab Test 01/21/19  0831  01/18/19  1818 04/27/18  1027   ALBUMIN  --   --  3.3* 4.2   HGB 11.1*   < > 12.3* 14.2   WBC 8.7   < > 7.7 6.3   A1C  --   --   --  6.6*    < > = values in this interval not displayed.       Wound Assessment (location):   Lt plantar fissure-type wound  Wound History:  Pt denies trauma to area but admits to diabetic nueropathy. Culture results noted  BLE skin intact.  LLE edema and erythema > RLE. Lymphedema wraps removed for inspection    Specific Dimensions (length x width x depth, in cm) linear 1.5cm x .4cm x .2cm with 100%pink base    Tunneling :NA    Undermining: NA    Palpation of the wound bed: normal    Slough appearance:  NA    Eschar appearance:  NA    Periwound Skin: intact, slightly calloused wound edges.    Color: normal and consistent with surrounding tissue    Temperature  normal     Drainage:  none    Odor: none    Pain: denies          Intervention:     Patient's chart evaluated.      Wound(s) assessed    Wound Care: completed    Orders  In Epic    Supplies  In floor supply     Discussed plan of care with patient and nursing.             All patient questions answered:  yes          Assessment:        Lt Plantar foot wound: fissure-like in appearance. No drainage, no odor, no erythema.  Wound bed clean.        Plan:      Nursing to notify the Provider(s) and re-consult the WOC Nurse if wound(s) deteriorate(s) or if the wound care plan needs reevaluation.        Lt plantar wound care: Daily       1 complete wound care prior to re-wrapping legs       2. Clean wound with MicroKlenz. Pat skin dry       3. Apply Iodosorb gel to wound bed       4. Cover with Mepilex border       5. Wrap per routine      WOC Nurse will return: weekly and prn

## 2019-01-21 NOTE — PLAN OF CARE
Pt A&Ox4. VSS. On RA. Good appetite and oral intake. Spouse brought in food from home. , 159. Metformin restarted this evening. Voiding appropriately and reports BM today. Lymph wraps applied by PT, pt instructed on goals of care. To be removed Monday at 1000 for rest period and reassessment. ID saw pt again today, will cont IV abx with discharge possible in the next couple of days. Pt up and moving in room and halls. Keeping legs elevated. Calls as needed. Maintaining contact precautions, awaiting sensitivities.

## 2019-01-21 NOTE — PROGRESS NOTES
[facility]    Hospitalist Progress Note      Assessment & Plan   Peterson Vazquez is a very pleasant 55-year-old obese male with a history DVT, HLD, HTN, DM2, and chronic lymphedema who presented with left lower extremity cellulitis.     Cellulitis, left lower extremity.  Possibly from laceration of sole of left foot, given patient is diabetic and erythema has rapidly expanded. US Doppler of left leg was negative for DVT.  - Wound culture left plantar foot growing group B strep and staph species.  - Evaluated by ID.  - Empirically treated with vanc/zosyn and transitioned to Ceftriaxone 1/19. Plan Augmentin 875mg BID x 10 days at discharge.  - Elevate legs.  - PT consulted for lymphedema cares and recommending discharge to home with OP edema services.  - Slowly improving, likely 1-2 more days IV antibiotics.    Type 2 diabetes. A1C 6.6 (4/2018). -180s.  - Continue with Sliding scale insulin  - Continues on PTA Metformin.  - Check A1C.  - Encourage dietary changes and BG monitoring at home.     HTN, HLD.  - Continue PTA statin and lisinopril with parameters.   - Labetalol as needed.     Morbid Obesity. Encouraged weight loss     DVT Prophylaxis: Enoxaparin (Lovenox) SQ  Code Status: Full Code    Disposition: Expected discharge in 1-2 days pending improvement in cellulitis    JoAnna K. Barthell, MD  Text Page  (7am to 6pm)    Interval History   Tmax 100 . Erythema within demarcated area. No cp, difficulty breathing. Last bm this AM. Does not routinely follow with primary or check blood sugars.    -Data reviewed today: I reviewed all new labs and imaging results over the last 24 hours. I personally reviewed no images or EKG's today.    Physical Exam   Temp: 98.3  F (36.8  C) Temp src: Oral BP: 145/79 Pulse: 87   Resp: 18 SpO2: 96 % O2 Device: None (Room air)    Vitals:    01/19/19 0620 01/20/19 0700 01/21/19 0700   Weight: 147.6 kg (325 lb 6.4 oz) 147 kg (324 lb 1.2 oz) 133 kg (293 lb 3.4 oz)    Constitutional: Appears stated age, no acute distress.  Respiratory: Breath sounds CTA. No increased work of breathing.  Cardiovascular: RRR, no rub or murmur. Bilateral LE lymphedema. Dorsalis pedis pulses detected and symmetric.  GI: Soft, non-tender, non-distended. Bowel sounds present.  Skin: Warm, dry. Large lower extremities with chronic appearing discoloration right LE. LLE with warmth and erythema that appears receded compared to prior demarcation.    Medications       aspirin  81 mg Oral At Bedtime     atorvastatin  40 mg Oral At Bedtime     cefTRIAXone  2 g Intravenous Q24H     enoxaparin  40 mg Subcutaneous Q12H     influenza vaccine adult (product based on age)  0.5 mL Intramuscular Prior to discharge     insulin aspart  1-7 Units Subcutaneous TID AC     insulin aspart  1-5 Units Subcutaneous At Bedtime     lisinopril  10 mg Oral At Bedtime     metFORMIN  1,000 mg Oral BID w/meals       Data   Recent Labs   Lab 01/21/19  0831 01/20/19  0819 01/19/19  0923 01/18/19  1818   WBC 8.7  --  6.4 7.7   HGB 11.1*  --  11.2* 12.3*   MCV 95  --  95 95     --  150 177   NA  --   --  141 136   POTASSIUM  --   --  4.1 3.8   CHLORIDE  --   --  109 104   CO2  --   --  23 24   BUN  --   --  16 19   CR 0.90 0.95 0.95 0.90   ANIONGAP  --   --  9 8   NING  --   --  8.8 9.3   GLC  --   --  199* 197*   ALBUMIN  --   --   --  3.3*   PROTTOTAL  --   --   --  7.6   BILITOTAL  --   --   --  0.8   ALKPHOS  --   --   --  103   ALT  --   --   --  44   AST  --   --   --  19       No results found for this or any previous visit (from the past 24 hour(s)).

## 2019-01-21 NOTE — PROGRESS NOTES
[facility]    Infectious Disease Progress Note    Date of Service (when I saw the patient): 01/21/2019     Assessment & Plan   Peterson Vazquez is a 58 year old male who was admitted on 1/18/2019.     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     Recommendations:   I think the Group B Strep is the pathogen - history most compatible w strep infection  Clinically much better  Continue leg elevation / ceftriaxone  No isolation needed  When ready for discharge( another 24- 48 hours) can be transitioned to oral Augmtnin 875 mg BID for 10 days.     Susanna Gaspar MD    Interval History   Less redness   No fever    Physical Exam   Temp: 98.3  F (36.8  C) Temp src: Oral BP: 145/79 Pulse: 87   Resp: 18 SpO2: 96 % O2 Device: None (Room air)    Vitals:    01/19/19 0620 01/20/19 0700 01/21/19 0700   Weight: 147.6 kg (325 lb 6.4 oz) 147 kg (324 lb 1.2 oz) 133 kg (293 lb 3.4 oz)     Vital Signs with Ranges  Temp:  [98.3  F (36.8  C)-100  F (37.8  C)] 98.3  F (36.8  C)  Pulse:  [87-93] 87  Resp:  [16-18] 18  BP: (145-150)/(78-84) 145/79  SpO2:  [93 %-96 %] 96 %    Constitutional: Awake, alert, cooperative, no apparent distress  Lungs: Clear to auscultation bilaterally, no crackles or wheezing  Cardiovascular: Regular rate and rhythm, normal S1 and S2, and no murmur noted  Abdomen: Normal bowel sounds, soft, non-distended, non-tender  Skin: No rashes, no cyanosis, no edema  Other:    Medications       aspirin  81 mg Oral At Bedtime     atorvastatin  40 mg Oral At Bedtime      cefTRIAXone  2 g Intravenous Q24H     enoxaparin  40 mg Subcutaneous Q12H     influenza vaccine adult (product based on age)  0.5 mL Intramuscular Prior to discharge     insulin aspart  1-7 Units Subcutaneous TID AC     insulin aspart  1-5 Units Subcutaneous At Bedtime     lisinopril  10 mg Oral At Bedtime     metFORMIN  1,000 mg Oral BID w/meals       Data   All microbiology laboratory data reviewed.  Recent Labs   Lab Test 01/21/19  0831 01/19/19  0923 01/18/19  1818   WBC 8.7 6.4 7.7   HGB 11.1* 11.2* 12.3*   HCT 33.3* 33.0* 36.3*   MCV 95 95 95    150 177     Recent Labs   Lab Test 01/21/19  0831 01/20/19  0819 01/19/19  0923   CR 0.90 0.95 0.95     No lab results found.  Recent Labs   Lab Test 01/18/19  1942 06/12/16  1929 06/12/16  1829 05/02/16  0557 01/20/14  1239   CULT Moderate growth  Streptococcus agalactiae sero group B  Susceptibility testing in progress  *  Moderate growth  Staphylococcus pseudintermedius  *  Moderate growth  Corynebacterium striatum  Identification obtained by MALDI-TOF mass spectrometry research use only database. Test   characteristics determined and verified by the [facility]   ([facility]) [place], [place].  Susceptibility testing not routinely done  * No growth No growth No growth No Beta Streptococcus isolated

## 2019-01-21 NOTE — PLAN OF CARE
"Discharge Planner PT /Lymphedema  Patient plan for discharge: Home with OP edema services  Current status: Lymphedema and PT evaluation completed. 57 yo male adm with fever and chills, and L LE pain. Found to have cellulitis. States the L LE was quite red \"balloning up.\" Now 48 hrs with antibiotics. States he has a similar episode in the past with the R LE. Reports edema in B LEs began \"about 5 years ago.\"  Resides in house with his wife, works as a  full time. Independent with all mobility and ADLs at baseline. Reports his wife is willing and able bodied to assist with wrap placement at home.  Presents alert and oriented. Independent with all mobility. Edema presentation as follows: LLE greater in volume by appearance from the foot through the thigh in comparison to the R. Pt states usually the R is larger. L LE is red, warm and presents with a 2+ pitting. Dorsum of the B feet puffy with minimal contours of the malleoli. No pain, admits to some numbness in the feet. \"I just don't know when I have sores.\" See PT eval for skin assessment and notations. Edu pt on lymph services, wraps placed. Pt in agreement for follow up at discharge. OP Lymph eval placed.   PT/Lymph to see tomorrow at 11. RN please have wraps removed, skin assessment completed, LEs washed, dried and lotion applied prior to PT arrival.   Barriers to return to prior living situation: None.  Recommendations for discharge: Home with OP Edema services  Rationale for recommendations: Pt will benefit from continued edema services at discharge to assist with skin integrity, wound healing and volume management for optimal functional mobility at discharge.       Entered by: Louann Sheldon 01/20/2019 6:27 PM       "

## 2019-01-21 NOTE — PLAN OF CARE
Pt A/O x4, VSS on RA. Pt denies pain, N/V and SOB. LLE red, swollen, and warm to the touch. Additional area marked on LLE, mild increased redness and warmth. +4 edema in BLE, ulcers healing. L foot wound, WOC consult complete, wound care orders placed. Lymph wraps removed, BLE cleansed, lotion applied and Lymph wraps replaced. PIV SL. +BS in all four quadrants, passing flatus. Mod carb diet, /152, insulin administered. Independent in room. ID following. Discharge pending.

## 2019-01-21 NOTE — PLAN OF CARE
Discharge Planner PT   Patient plan for discharge: Return home with OP edema services   Current status: Patient in supine upon arrival of therapist. RN completed skin cares and application of lotion prior to therapist arrival. Donned medium TG soft prior to application of short stretch bandages from base of toes to below knee crease. Reviewed signs/symptoms of intolerance of wraps. Plan to transition management of lymphedema wraps to nursing starting 1/22 at 1PM. See patient care order for details.   Barriers to return to prior living situation: None indicated.   Recommendations for discharge: Return home with OP edema services   Rationale for recommendations: Pt is at baseline in regards to mobility, pt will benefit from OP services to continue to manage LE edema.        Entered by: Paige Elizondo 01/21/2019 2:27 PM     Physical Therapy Discharge Summary    Reason for therapy discharge:    All goals and outcomes met, no further needs identified.    Progress towards therapy goal(s). [facility] for goal details.  Goals met    Therapy recommendation(s):    Continued therapy is recommended.  Rationale/Recommendations:  Pt will benefit from OP edema services to continue to progress/manage LE edema..

## 2019-01-21 NOTE — PROGRESS NOTES
"Note Infectious Disease Consult Service Progress Note  Covering for [facility]   Pt Name Peterson MACDONALD George   Date 01/20/2019   MRN [id number]       Notes / labs / imaging test results and other data were reviewed    CHIEF COMPLAINT: REASON FOR VISIT    L leg cellulitis      HPI    55-year-old obese male with a history of hyperlipidemia, type 2 diabetes and hypertension, hx of DVT, chronic lymphedema legs and cellulitis RIGHT lower leg in past     now presents with left lower extremity cellulitis.     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     1.15  Short-lived emesis (a coworker in [place] w him prior week ill recently w GI illness)  1.18  Acute onset redness / swelling L lower leg  Rapid progression of redness up leg / Rigors / Tenderness in LEFT groin  To ER => admitted => broad spectrum antibiotics     1.19  Better, no more rigors, some receding of redness, groin no longer tender  1.20 Better / no pain in leg    Remainder of 14-point ROS was negative except as listed above    PFSH:  Personal / Family / Social Histories were reviewed and updated  Nothing  new    Vital Signs: /84 (BP Location: Left arm)   Pulse 91   Temp 98.7  F (37.1  C) (Oral)   Resp 16   Ht 1.854 m (6' 1\")   Wt 147 kg (324 lb 1.2 oz)   SpO2 96%   BMI 42.76 kg/m    EXAM    constitution   In bed / leg up / no acute distress     lungs   clear     neuro   Normal conversation     L leg   Compression wrap on  Above wrap, redness appears smaller in area than line marked on skin     abd   Big / soft     psyche   In good spirits     Data  Cultures    1.18 Wound Grp B strep & S aureus     LABS  Lab Results   Component Value Date/Time    WBC 6.4 01/19/2019 09:23 AM    WBC 7.7 01/18/2019 06:18 PM    WBC 6.3 04/27/2018 10:27 AM    WBC 7.6 06/21/2017 11:28 AM    AST 19 01/18/2019 06:18 PM    AST 22 04/27/2018 10:27 AM "    AST 25 03/03/2017 10:12 AM    AST 38 05/02/2016 05:01 AM    ALT 44 01/18/2019 06:18 PM    ALT 48 04/27/2018 10:27 AM    ALT 47 03/03/2017 10:12 AM    ALT 96 (H) 05/02/2016 05:01 AM    ALKPHOS 103 01/18/2019 06:18 PM    ALKPHOS 102 04/27/2018 10:27 AM    ALKPHOS 95 03/03/2017 10:12 AM    ALKPHOS 105 05/02/2016 05:01 AM         ASSESSMENT & SUGGESTIONS    (L03.116) Cellulitis of left lower extremity  (primary encounter diagnosis)  (L03.116) Left leg cellulitis  (I89.0) Lymphedema of both lower extremities  (S91.302A) Open wound of left foot, initial encounter  (I89.0) Chronic acquired lymphedema  (E66.01) Morbid obesity (H)  (E11.21) Type 2 diabetes mellitus with diabetic nephropathy, unspecified whether long term insulin  (R89.5) Positive culture finding  (A49.1) Group B streptococcal infection  (A49.01) Staph aureus infection    I think the Group B Strep is the pathogen - history most compatible w strep infection  Clinically much better  Continue leg elevation / ceftriaxone  F/u on MICs of the S aureus isolate -- if MRSA, could add TMP-S         Montrell Torres MD  Covering for [facility]  [employer] Infectious Diseases  [phone]      CURRENT MED REVIEWD  Current Facility-Administered Medications   Medication     aspirin EC tablet 81 mg     atorvastatin (LIPITOR) tablet 40 mg     cefTRIAXone (ROCEPHIN) 2 g vial to attach to  ml bag for ADULTS or NS 50 ml bag for PEDS     glucose gel 15-30 g    Or     dextrose 50 % injection 25-50 mL    Or     glucagon injection 1 mg     enoxaparin (LOVENOX) injection 40 mg     influenza recomb quadrivalent PF (FLUBLOK) injection 0.5 mL     insulin aspart (NovoLOG) inj (RAPID ACTING)     insulin aspart (NovoLOG) inj (RAPID ACTING)     labetalol (NORMODYNE/TRANDATE) injection 10 mg     lisinopril (PRINIVIL/ZESTRIL) tablet 10 mg     melatonin tablet 1 mg     metFORMIN (GLUCOPHAGE-XR) 24 hr tablet 1,000 mg     naloxone (NARCAN) injection  0.1-0.4 mg

## 2019-01-21 NOTE — PLAN OF CARE
A&Ox4. VSS, except Temp at 0200 was 100.4. Decreased at 0400 to 98.6. Lungs clear & diminished.  BS WNL.  BGM at 0200 WNL.  Lymph wraps applied by PT, to be removed Monday at 1000.  Up indep.  Legs elevated.  Contact precautions, awaiting sensitivities.

## 2019-01-22 LAB
CREAT SERPL-MCNC: 0.95 MG/DL (ref 0.66–1.25)
GFR SERPL CREATININE-BSD FRML MDRD: 87 ML/MIN/{1.73_M2}
GLUCOSE BLDC GLUCOMTR-MCNC: 135 MG/DL (ref 70–99)
GLUCOSE BLDC GLUCOMTR-MCNC: 145 MG/DL (ref 70–99)
GLUCOSE BLDC GLUCOMTR-MCNC: 153 MG/DL (ref 70–99)
GLUCOSE BLDC GLUCOMTR-MCNC: 156 MG/DL (ref 70–99)
GLUCOSE BLDC GLUCOMTR-MCNC: 160 MG/DL (ref 70–99)

## 2019-01-22 PROCEDURE — 99232 SBSQ HOSP IP/OBS MODERATE 35: CPT | Performed by: PHYSICIAN ASSISTANT

## 2019-01-22 PROCEDURE — 25000128 H RX IP 250 OP 636: Performed by: INTERNAL MEDICINE

## 2019-01-22 PROCEDURE — 25000132 ZZH RX MED GY IP 250 OP 250 PS 637: Performed by: STUDENT IN AN ORGANIZED HEALTH CARE EDUCATION/TRAINING PROGRAM

## 2019-01-22 PROCEDURE — 99207 ZZC CDG-MDM COMPONENT: MEETS MODERATE - UP CODED: CPT | Performed by: PHYSICIAN ASSISTANT

## 2019-01-22 PROCEDURE — 82565 ASSAY OF CREATININE: CPT | Performed by: STUDENT IN AN ORGANIZED HEALTH CARE EDUCATION/TRAINING PROGRAM

## 2019-01-22 PROCEDURE — 25000132 ZZH RX MED GY IP 250 OP 250 PS 637: Performed by: INTERNAL MEDICINE

## 2019-01-22 PROCEDURE — 00000146 ZZHCL STATISTIC GLUCOSE BY METER IP

## 2019-01-22 PROCEDURE — 36415 COLL VENOUS BLD VENIPUNCTURE: CPT | Performed by: STUDENT IN AN ORGANIZED HEALTH CARE EDUCATION/TRAINING PROGRAM

## 2019-01-22 PROCEDURE — 90682 RIV4 VACC RECOMBINANT DNA IM: CPT | Performed by: INTERNAL MEDICINE

## 2019-01-22 PROCEDURE — 12000000 ZZH R&B MED SURG/OB

## 2019-01-22 RX ADMIN — INFLUENZA A VIRUS A/MICHIGAN/45/2015 (H1N1) RECOMBINANT HEMAGGLUTININ ANTIGEN, INFLUENZA A VIRUS A/SINGAPORE/INFIMH-16-0019/2016 (H3N2) RECOMBINANT HEMAGGLUTININ ANTIGEN, INFLUENZA B VIRUS B/MARYLAND/15/2016 RECOMBINANT HEMAGGLUTININ ANTIGEN, AND INFLUENZA B VIRUS B/PHUKET/3073/2013 RECOMBINANT HEMAGGLUTININ ANTIGEN 0.5 ML: 45; 45; 45; 45 INJECTION INTRAMUSCULAR at 08:39

## 2019-01-22 RX ADMIN — CEFTRIAXONE SODIUM 2 G: 2 INJECTION, POWDER, FOR SOLUTION INTRAMUSCULAR; INTRAVENOUS at 21:10

## 2019-01-22 RX ADMIN — ENOXAPARIN SODIUM 40 MG: 40 INJECTION SUBCUTANEOUS at 21:10

## 2019-01-22 RX ADMIN — INSULIN ASPART 1 UNITS: 100 INJECTION, SOLUTION INTRAVENOUS; SUBCUTANEOUS at 19:18

## 2019-01-22 RX ADMIN — ASPIRIN 81 MG: 81 TABLET, COATED ORAL at 21:10

## 2019-01-22 RX ADMIN — ATORVASTATIN CALCIUM 40 MG: 40 TABLET, FILM COATED ORAL at 21:10

## 2019-01-22 RX ADMIN — METFORMIN HYDROCHLORIDE 1000 MG: 500 TABLET, EXTENDED RELEASE ORAL at 08:35

## 2019-01-22 RX ADMIN — LISINOPRIL 10 MG: 10 TABLET ORAL at 21:10

## 2019-01-22 RX ADMIN — METFORMIN HYDROCHLORIDE 1000 MG: 500 TABLET, EXTENDED RELEASE ORAL at 21:10

## 2019-01-22 RX ADMIN — INSULIN ASPART 1 UNITS: 100 INJECTION, SOLUTION INTRAVENOUS; SUBCUTANEOUS at 08:37

## 2019-01-22 RX ADMIN — ENOXAPARIN SODIUM 40 MG: 40 INJECTION SUBCUTANEOUS at 10:46

## 2019-01-22 ASSESSMENT — ACTIVITIES OF DAILY LIVING (ADL)
ADLS_ACUITY_SCORE: 10

## 2019-01-22 ASSESSMENT — MIFFLIN-ST. JEOR: SCORE: 2191.88

## 2019-01-22 NOTE — PLAN OF CARE
A/O x4. VSS on ra, sbp in the 150s, baseline for pt. Ind. Lymph wraps to legs, to be removed at 1300 and rewrapped. Redness to lle receding from borders. No c/o pain overnight. Mod carb diet, wife brings in food. Discharge pending progress of cellulitis.

## 2019-01-22 NOTE — PROGRESS NOTES
[facility]    Hospitalist Progress Note      Assessment & Plan   Peterson Vazquez is a very pleasant 55-year-old obese male with a history DVT, HLD, HTN, DM2, and chronic lymphedema who presented with left lower extremity cellulitis.     Cellulitis, left lower extremity.  Possibly from laceration of sole of left foot, given patient is diabetic and erythema has rapidly expanded. US Doppler of left leg was negative for DVT.  - Wound culture left plantar foot growing group B strep and staph species.  - Evaluated by ID.  - Empirically treated with vanc/zosyn and transitioned to Ceftriaxone 1/19. Plan Augmentin 875mg BID x 10 days at discharge.  - Elevate legs.  - PT consulted for lymphedema cares and recommending discharge to home with OP edema services.  - Slowly improving, likely 1 more day IV antibiotics.    Type 2 diabetes. A1C 6.7 (1/22/2019). -180s.  - Continue with sliding scale insulin.  - Continues on PTA Metformin.  - Encourage dietary changes and BG monitoring at home.     HTN, HLD.  - Continue PTA statin and lisinopril with parameters.   - Labetalol as needed.     Morbid Obesity. Encouraged weight loss. Plans to start keto diet.     DVT Prophylaxis: Enoxaparin (Lovenox) SQ  Code Status: Full Code    This patient was discussed with Dr. Haskins of the Hospitalist Service who agrees with current plans as outlined above.    Disposition: Expected discharge tmrw if cellulitis continues to improve.  JoAnna K. Barthell, MD  Text Page  (7am to 6pm)    Interval History   Tmax 100 . Erythema less than yesterday, most notable distal extremity. No cp, difficulty breathing. Last bm this AM.    -Data reviewed today: I reviewed all new labs and imaging results over the last 24 hours. I personally reviewed no images or EKG's today.    Physical Exam   Temp: 98.4  F (36.9  C) Temp src: Oral BP: 133/75 Pulse: 83   Resp: 16 SpO2: 93 % O2 Device: None (Room air)    Vitals:    01/20/19 0700 01/21/19 0700  01/22/19 0549   Weight: 147 kg (324 lb 1.2 oz) 133 kg (293 lb 3.4 oz) 132.8 kg (292 lb 12.3 oz)   Constitutional: Appears stated age, no acute distress.  Respiratory: Breath sounds CTA. No increased work of breathing.  Cardiovascular: RRR, no rub or murmur. Bilateral LE lymphedema. Dorsalis pedis pulses detected and symmetric.  GI: Soft, non-tender, non-distended. Bowel sounds present.  Skin: Warm, dry. Large lower extremities with chronic appearing discoloration right LE. LLE with warmth and erythema that appears receded compared to prior demarcation.    Medications       aspirin  81 mg Oral At Bedtime     atorvastatin  40 mg Oral At Bedtime     cefTRIAXone  2 g Intravenous Q24H     enoxaparin  40 mg Subcutaneous Q12H     insulin aspart  1-7 Units Subcutaneous TID AC     insulin aspart  1-5 Units Subcutaneous At Bedtime     lisinopril  10 mg Oral At Bedtime     metFORMIN  1,000 mg Oral BID w/meals       Data   Recent Labs   Lab 01/22/19  0805 01/21/19  0831 01/20/19  0819 01/19/19  0923 01/18/19  1818   WBC  --  8.7  --  6.4 7.7   HGB  --  11.1*  --  11.2* 12.3*   MCV  --  95  --  95 95   PLT  --  170  --  150 177   NA  --   --   --  141 136   POTASSIUM  --   --   --  4.1 3.8   CHLORIDE  --   --   --  109 104   CO2  --   --   --  23 24   BUN  --   --   --  16 19   CR 0.95 0.90 0.95 0.95 0.90   ANIONGAP  --   --   --  9 8   NING  --   --   --  8.8 9.3   GLC  --   --   --  199* 197*   ALBUMIN  --   --   --   --  3.3*   PROTTOTAL  --   --   --   --  7.6   BILITOTAL  --   --   --   --  0.8   ALKPHOS  --   --   --   --  103   ALT  --   --   --   --  44   AST  --   --   --   --  19       No results found for this or any previous visit (from the past 24 hour(s)).

## 2019-01-22 NOTE — PROGRESS NOTES
[facility]    Infectious Disease Progress Note    Date of Service (when I saw the patient): 01/22/2019     Assessment & Plan   Peterson Vazquez is a 58 year old male who was admitted on 1/18/2019.     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     Recommendations:   I think the Group B Strep is the pathogen - history most compatible w strep infection  Clinically much better  Continue leg elevation / ceftriaxone  No isolation needed  When ready for discharge( another 24) can be transitioned to oral Augmtnin 875 mg BID for 10 days.     Susanna Gaspar MD    Interval History   Less swelling, slightly more redness on the left lower leg   No fever    Physical Exam   Temp: 98.4  F (36.9  C) Temp src: Oral BP: 133/75 Pulse: 83   Resp: 16 SpO2: 93 % O2 Device: None (Room air)    Vitals:    01/20/19 0700 01/21/19 0700 01/22/19 0549   Weight: 147 kg (324 lb 1.2 oz) 133 kg (293 lb 3.4 oz) 132.8 kg (292 lb 12.3 oz)     Vital Signs with Ranges  Temp:  [98.4  F (36.9  C)-99.3  F (37.4  C)] 98.4  F (36.9  C)  Pulse:  [83-95] 83  Resp:  [16-18] 16  BP: (133-154)/(75-80) 133/75  SpO2:  [93 %-95 %] 93 %    Constitutional: Awake, alert, cooperative, no apparent distress  Lungs: Clear to auscultation bilaterally, no crackles or wheezing  Cardiovascular: Regular rate and rhythm, normal S1 and S2, and no murmur noted  Abdomen: Normal bowel sounds, soft, non-distended, non-tender  Skin: No rashes, no cyanosis, no edema  Other:    Medications       aspirin  81 mg Oral At Bedtime      atorvastatin  40 mg Oral At Bedtime     cefTRIAXone  2 g Intravenous Q24H     enoxaparin  40 mg Subcutaneous Q12H     insulin aspart  1-7 Units Subcutaneous TID AC     insulin aspart  1-5 Units Subcutaneous At Bedtime     lisinopril  10 mg Oral At Bedtime     metFORMIN  1,000 mg Oral BID w/meals       Data   All microbiology laboratory data reviewed.  Recent Labs   Lab Test 01/21/19  0831 01/19/19  0923 01/18/19  1818   WBC 8.7 6.4 7.7   HGB 11.1* 11.2* 12.3*   HCT 33.3* 33.0* 36.3*   MCV 95 95 95    150 177     Recent Labs   Lab Test 01/22/19  0805 01/21/19  0831 01/20/19  0819   CR 0.95 0.90 0.95     No lab results found.  Recent Labs   Lab Test 01/18/19  1942 06/12/16  1929 06/12/16  1829 05/02/16  0557 01/20/14  1239   CULT Moderate growth  Streptococcus agalactiae sero group B  *  Moderate growth  Staphylococcus pseudintermedius  *  Moderate growth  Corynebacterium striatum  Identification obtained by MALDI-TOF mass spectrometry research use only database. Test   characteristics determined and verified by the Infectious Diseases Diagnostic Laboratory   ([facility]) [place], [place].  Susceptibility testing not routinely done  * No growth No growth No growth No Beta Streptococcus isolated

## 2019-01-22 NOTE — PLAN OF CARE
Pt A&Ox4. VSS with BP in the 140-150's. On RA. Dry cough. Good appetite and oral intake. Pt's spouse is bringing in meals. , 204. Up independently and walked halls. Legs wrapped with lymph wraps. To be removed at 1300 tomorrow and rewrapped. Some redness noted above premarked lines, but more defused in color, light pink vs red. No pain or discomfort. IV SL. Calls as needed. Rounded on freq.

## 2019-01-22 NOTE — PLAN OF CARE
Pt A/O x4, VSS on RA. Pt denies pain, N/V and SOB. LLE red, swollen, warm to the touch, does not exceed border. +3 edema in BLE, ulcers healing. L foot wound, cleansed per plan of care and redressed. Lymph wraps removed for one hour, BLE cleansed, lotion applied and Lymph wraps replaced. PIV SL. +BS in all four quadrants, passing flatus. Mod carb diet, /135, insulin administered. Independent in room. ID following. Plan to discharge tomorrow evening. Will continue to monitor.

## 2019-01-23 VITALS
HEART RATE: 86 BPM | WEIGHT: 315 LBS | BODY MASS INDEX: 41.75 KG/M2 | OXYGEN SATURATION: 93 % | TEMPERATURE: 98.4 F | RESPIRATION RATE: 16 BRPM | SYSTOLIC BLOOD PRESSURE: 133 MMHG | HEIGHT: 73 IN | DIASTOLIC BLOOD PRESSURE: 72 MMHG

## 2019-01-23 LAB
GLUCOSE BLDC GLUCOMTR-MCNC: 122 MG/DL (ref 70–99)
GLUCOSE BLDC GLUCOMTR-MCNC: 128 MG/DL (ref 70–99)
GLUCOSE BLDC GLUCOMTR-MCNC: 147 MG/DL (ref 70–99)

## 2019-01-23 PROCEDURE — 25000128 H RX IP 250 OP 636: Performed by: INTERNAL MEDICINE

## 2019-01-23 PROCEDURE — 99238 HOSP IP/OBS DSCHRG MGMT 30/<: CPT | Performed by: INTERNAL MEDICINE

## 2019-01-23 PROCEDURE — 25000132 ZZH RX MED GY IP 250 OP 250 PS 637: Performed by: INTERNAL MEDICINE

## 2019-01-23 PROCEDURE — 00000146 ZZHCL STATISTIC GLUCOSE BY METER IP

## 2019-01-23 RX ADMIN — METFORMIN HYDROCHLORIDE 1000 MG: 500 TABLET, EXTENDED RELEASE ORAL at 08:40

## 2019-01-23 RX ADMIN — INSULIN ASPART 1 UNITS: 100 INJECTION, SOLUTION INTRAVENOUS; SUBCUTANEOUS at 08:41

## 2019-01-23 RX ADMIN — ENOXAPARIN SODIUM 40 MG: 40 INJECTION SUBCUTANEOUS at 08:40

## 2019-01-23 ASSESSMENT — ACTIVITIES OF DAILY LIVING (ADL)
ADLS_ACUITY_SCORE: 8
ADLS_ACUITY_SCORE: 8
ADLS_ACUITY_SCORE: 10
ADLS_ACUITY_SCORE: 8
ADLS_ACUITY_SCORE: 8

## 2019-01-23 ASSESSMENT — MIFFLIN-ST. JEOR: SCORE: 2292.71

## 2019-01-23 NOTE — PROGRESS NOTES
[facility]    Infectious Disease Progress Note    Date of Service (when I saw the patient): 01/23/2019     Assessment & Plan   Peterson Vazquez is a 58 year old male who was admitted on 1/18/2019.     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     Recommendations:   Ok to discharge on oral Augmtnin 875 mg BID for 10 days.     Susanna Gaspar MD    Interval History   Less swelling and redness on the left lower leg   No fever    Physical Exam   Temp: 98.5  F (36.9  C) Temp src: Oral BP: 147/80 Pulse: 87   Resp: 18 SpO2: 94 % O2 Device: None (Room air)    Vitals:    01/21/19 0700 01/22/19 0549 01/23/19 0657   Weight: 133 kg (293 lb 3.4 oz) 132.8 kg (292 lb 12.3 oz) 142.9 kg (315 lb)     Vital Signs with Ranges  Temp:  [98.4  F (36.9  C)-98.8  F (37.1  C)] 98.5  F (36.9  C)  Pulse:  [85-95] 87  Resp:  [16-18] 18  BP: (136-165)/(76-83) 147/80  SpO2:  [94 %-96 %] 94 %    Constitutional: Awake, alert, cooperative, no apparent distress  Lungs: Clear to auscultation bilaterally, no crackles or wheezing  Cardiovascular: Regular rate and rhythm, normal S1 and S2, and no murmur noted  Abdomen: Normal bowel sounds, soft, non-distended, non-tender  Skin: No rashes, no cyanosis, no edema  Other:    Medications       aspirin  81 mg Oral At Bedtime     atorvastatin  40 mg Oral At Bedtime     cefTRIAXone  2 g Intravenous Q24H     enoxaparin  40 mg Subcutaneous Q12H     insulin aspart  1-7 Units Subcutaneous TID AC     insulin aspart  1-5 Units Subcutaneous At Bedtime      lisinopril  10 mg Oral At Bedtime     metFORMIN  1,000 mg Oral BID w/meals       Data   All microbiology laboratory data reviewed.  Recent Labs   Lab Test 01/21/19  0831 01/19/19  0923 01/18/19  1818   WBC 8.7 6.4 7.7   HGB 11.1* 11.2* 12.3*   HCT 33.3* 33.0* 36.3*   MCV 95 95 95    150 177     Recent Labs   Lab Test 01/22/19  0805 01/21/19  0831 01/20/19  0819   CR 0.95 0.90 0.95     No lab results found.  Recent Labs   Lab Test 01/18/19  1942 06/12/16  1929 06/12/16  1829 05/02/16  0557 01/20/14  1239   CULT Moderate growth  Streptococcus agalactiae sero group B  *  Moderate growth  Staphylococcus pseudintermedius  *  Moderate growth  Corynebacterium striatum  Identification obtained by MALDI-TOF mass spectrometry research use only database. Test   characteristics determined and verified by the Infectious Diseases Diagnostic Laboratory   ([facility]) [place], [place].  Susceptibility testing not routinely done  * No growth No growth No growth No Beta Streptococcus isolated

## 2019-01-23 NOTE — DISCHARGE SUMMARY
[facility]  Discharge Summary        Peterson Vazquez MRN# [id number]   YOB: 1960 Age: 58 year old     Date of Admission:  1/18/2019  Date of Discharge:  1/23/2019  Admitting Physician:  Henok Denise MD  Discharge Physician: Chidi Haskins MD  Discharging Service: Hospitalist     Primary Provider:  Yazmin Pradhan  Primary Care Physician Phone Number: [phone]         Discharge Diagnoses/Problem Oriented Hospital Course (Providers):    Peterson Vazquez was admitted on 1/18/2019 by Henok Denise MD and I would refer you to their history and physical.  The following problems were addressed during his hospitalization:    Peterson Vazquez is a very pleasant 55-year-old obese male with a history DVT, HLD, HTN, DM2, and chronic lymphedema who presented with left lower extremity cellulitis.     Cellulitis, left lower extremity.  Possibly from laceration of sole of left foot, given patient is diabetic and erythema has rapidly expanded. US Doppler of left leg was negative for DVT.  - Wound culture left plantar foot growing group B strep and staph species.  - Evaluated by ID.  - Empirically treated with vanc/zosyn and transitioned to Ceftriaxone 1/19. Plan Augmentin 875mg BID x 10 days at discharge.  - Elevate legs, get custom jobst stockings.  - PT consulted for lymphedema cares and recommending discharge to home with OP edema services.  - improved on IV antibiotics, change to po augmentin for 10 days     Type 2 diabetes. A1C 6.7 (1/22/2019). -180s.  - Continue with sliding scale insulin.  - Continues on PTA Metformin.  - Encourage dietary changes and BG monitoring at home.     HTN, HLD.  - Continue PTA statin and lisinopril with parameters.   - Labetalol as needed.     Morbid Obesity. Encouraged weight loss. Plans to start keto diet.              Code Status:      Full Code         Important Results:      NAD  LUNGS CTA  CV RRR  ABD soft  EXT decreased LLE erythyema and warmth,  left foot wound  Neuro non-focal         Pending Results:        Unresulted Labs Ordered in the Past 30 Days of this Admission     No orders found from 11/19/2018 to 1/19/2019.               Discharge Instructions and Follow-Up:      Follow-up Appointments     Follow-up and recommended labs and tests       Follow up with primary care provider, Yazmin Pradhan,10-14 days for   follow up.                  Discharge Disposition:      Discharged to home         Discharge Medications:        Current Discharge Medication List      START taking these medications    Details   amoxicillin-clavulanate (AUGMENTIN) 875-125 MG tablet Take 1 tablet by mouth 2 times daily for 10 days  Qty: 20 tablet, Refills: 0    Associated Diagnoses: Cellulitis of left lower extremity         CONTINUE these medications which have NOT CHANGED    Details   aspirin 81 MG EC tablet Take 1 tablet (81 mg) by mouth daily  Qty: 90 tablet, Refills: 3    Associated Diagnoses: Type 2 diabetes mellitus with diabetic nephropathy, without long-term current use of insulin (H)      atorvastatin (LIPITOR) 40 MG tablet Take 1 tablet (40 mg) by mouth daily  Qty: 90 tablet, Refills: 3    Associated Diagnoses: Type 2 diabetes mellitus with diabetic nephropathy, without long-term current use of insulin (H)      cholecalciferol (VITAMIN D) 1000 UNIT tablet Take 1 tablet (1,000 Units) by mouth daily  Qty: 100 tablet, Refills: 3      Cyanocobalamin (B-12) 1000 MCG TBCR Take 1,000 mcg by mouth daily  Qty: 100 tablet, Refills: 1    Associated Diagnoses: Type 2 diabetes mellitus with diabetic nephropathy (H); Polyneuropathy associated with underlying disease (H)      lisinopril (PRINIVIL/ZESTRIL) 10 MG tablet Take 1 tablet (10 mg) by mouth daily  Qty: 90 tablet, Refills: 3    Associated Diagnoses: Essential hypertension      loratadine (CLARITIN) 10 MG tablet Take 10 mg by mouth At Bedtime      magnesium gluconate (MAGONATE) 500 (27 Mg) MG tablet Take 500 mg by mouth At  Bedtime      metFORMIN (GLUCOPHAGE-XR) 500 MG 24 hr tablet Take 2 tablets (1,000 mg) by mouth 2 times daily (with meals)  Qty: 360 tablet, Refills: 3    Associated Diagnoses: Type 2 diabetes mellitus with diabetic nephropathy, without long-term current use of insulin (H)      minocycline (MINOCIN/DYNACIN) 100 MG capsule TAKE 1 CAPSULE DAILY WITH FULL GLASS OF WATER  Qty: 90 capsule, Refills: 3    Associated Diagnoses: Rosacea      Multiple Vitamin (DAILY MULTIVITAMIN PO) Take 1 tablet by mouth At Bedtime       Turmeric (RA TURMERIC) 500 MG CAPS Take 1 capsule by mouth daily      Blood Glucose Monitoring Suppl (BLOOD GLUCOSE METER) KIT 1 Device daily  Qty: 1 kit, Refills: 2    Comments: Give glucose device, strips, lancets etc for 1 year.  Tests daily  Associated Diagnoses: Diabetes mellitus, type 2 (H)                  Allergies:         Allergies   Allergen Reactions     Fluoxetine Hcl      Prozac: anxiety            Consultations This Hospital Stay:      Consultation during this admission received from infectious disease          Discharge Orders for Skilled Facility (from Discharge Orders):        After Care Instructions     Activity      Your activity upon discharge: activity as tolerated         Diet      Follow this diet upon discharge: Orders Placed This Encounter      Moderate Consistent CHO Diet                    Rehab orders for Skilled Facility (from Discharge Orders):      Referrals     Future Labs/Procedures    LYMPHEDEMA THERAPY REFERRAL     Process Instructions:    *This therapy referral will be filtered to a centralized scheduling   office at [facility] and the patient will receive a   call to schedule an appointment at a [place] location most convenient for   them. *    Comments:    If you have not heard from the scheduling office within 2 business days,   please call [phone] for all locations, with the exception of [place],   please call [phone] and [place]  please call [phone].    Please be aware that coverage of these services is subject to the terms   and limitations of your health insurance plan.  Call member services at   your health plan with any benefit or coverage questions.             Discharge Time:      Less than 30 minutes.        Image Results From This Hospital Stay (For Non-EPIC Providers):        Results for orders placed or performed during the hospital encounter of 01/18/19   US Lower Extremity Venous Duplex Left    Narrative    ULTRASOUND VENOUS LOWER EXTREMITY UNILATERAL LEFT  1/18/2019 7:16 PM     HISTORY: Left leg swelling, redness and warmth. History of past deep  venous thrombosis. Rule out deep venous thrombosis.      COMPARISON: None.    TECHNIQUE: Ultrasound gray scale, Color Doppler flow, and spectral  Doppler waveform analysis performed.    FINDINGS: The left common femoral, superficial femoral, popliteal and  posterior tibial veins are patent and fully compressible and  demonstrate normal venous Doppler flow. The visualized greater  saphenous vein is negative for thrombus. For comparison the right  common femoral vein was evaluated and was unremarkable.      Impression    IMPRESSION: No deep venous thrombosis demonstrated.    HONG HOYT MD           Most Recent Lab Results In EPIC (For Non-EPIC Providers):    Most Recent 3 CBC's:  Recent Labs   Lab Test 01/21/19  0831 01/19/19  0923 01/18/19 1818   WBC 8.7 6.4 7.7   HGB 11.1* 11.2* 12.3*   MCV 95 95 95    150 177      Most Recent 3 BMP's:  Recent Labs   Lab Test 01/22/19  0805 01/21/19  0831 01/20/19  0819 01/19/19  0923 01/18/19  1818 04/27/18  1027   NA  --   --   --  141 136 140   POTASSIUM  --   --   --  4.1 3.8 4.3   CHLORIDE  --   --   --  109 104 109   CO2  --   --   --  23 24 22   BUN  --   --   --  16 19 24   CR 0.95 0.90 0.95 0.95 0.90 0.89   ANIONGAP  --   --   --  9 8 9   NING  --   --   --  8.8 9.3 9.5   GLC  --   --   --  199* 197* 158*     Most Recent 3  Troponin's:No lab results found.    Invalid input(s): TROP, TROPONINIES  Most Recent 3 INR's:No lab results found.  Most Recent 2 LFT's:  Recent Labs   Lab Test 01/18/19  1818 04/27/18  1027   AST 19 22   ALT 44 48   ALKPHOS 103 102   BILITOTAL 0.8 0.6     Most Recent Cholesterol Panel:  Recent Labs   Lab Test 04/27/18  1027   CHOL 175   LDL 77   HDL 37*   TRIG 305*     Most Recent 6 Bacteria Isolates From Any Culture (See EPIC Reports for Culture Details):  Recent Labs   Lab Test 01/18/19  1942 06/12/16  1929 06/12/16  1829 05/02/16  0557 01/20/14  1239   CULT Moderate growth  Streptococcus agalactiae sero group B  *  Moderate growth  Staphylococcus pseudintermedius  *  Moderate growth  Corynebacterium striatum  Identification obtained by MALDI-TOF mass spectrometry research use only database. Test   characteristics determined and verified by the [facility]   ([facility]) [place], [place].  Susceptibility testing not routinely done  * No growth No growth No growth No Beta Streptococcus isolated     Most Recent TSH, T4 and HgbA1c:  Recent Labs   Lab Test 01/21/19  0831  03/03/17  1012   TSH  --   --  1.29   A1C 6.7*   < > 7.5*    < > = values in this interval not displayed.

## 2019-01-23 NOTE — PROGRESS NOTES
SPIRITUAL HEALTH SERVICES Progress Note  [facility]    Visited pt Peterson RUIZ. Pt's wife was present and packing pt's stuff.  Pt reports that he feels fine and is ready to go home today.   Had a simple conversation with pt and pt's wife.      provided presence in care and blessings.       Lorene Hall  Chaplain Resident

## 2019-01-23 NOTE — PLAN OF CARE
Pt states his right leg feels better, continues to have quite a bit of swelling in aaron LE's, 3+, redness is dissipating from outlined area. BP slightly elevated, other VSS. Blood sugars stable. Appetite good. Up walking independent. Continues rocephin IV.

## 2019-01-23 NOTE — PLAN OF CARE
Pt A&O x4, up independently, VSS on RA. Denies pain, SOB, N/V. LS clear, BS active x4. Lymphedema wraps in place. On mod carb diet, . IV SL. +3-4 BLE edema noted. Plan to switch to oral abx and discharge home today, will continue to monitor.

## 2019-01-23 NOTE — PROGRESS NOTES
Discharge    Patient discharged to Home via Ambulatory with wife  Care plan note: Pt states his right leg feels better, continues to have quite a bit of swelling in aaron LE's, 3+, redness is dissipating from outlined area. BP slightly elevated, other VSS. Blood sugars stable. Appetite good. Up walking independent. Continues rocephin IV.        Listed belongings gathered and returned to patient. Yes  Care Plan and Patient education resolved: Yes  Prescriptions if needed, hard copies sent with patient  NA  Home and hospital acquired medications returned to patient: NA  Medication Bin checked and emptied on discharge Yes  Follow up appointment made for patient: Yes

## 2019-01-23 NOTE — PLAN OF CARE
Pt A/O#4, pleasant, independent, VSS on RA, afibrile, denies pain, unable to assess LLE-lymph wraps on, BLE 3-4+ edematous, good po, adequate UOP, ID following, continues on Abx, bg 145/153, discharge to home tomorrow, will monitor.

## 2019-01-24 ENCOUNTER — TELEPHONE (OUTPATIENT)
Dept: FAMILY MEDICINE | Facility: CLINIC | Age: 59
End: 2019-01-24

## 2019-01-24 NOTE — TELEPHONE ENCOUNTER
"    Hospital/TCU/ED for chronic condition Discharge Protocol    \"Hi, my name is Therese Willett, a registered nurse, and I am calling from [facility].  I am calling to follow up and see how things are going for you after your recent emergency visit/hospital/TCU stay.\"    Tell me how you are doing now that you are home?\" I'm not going to lose the leg.       Discharge Instructions    \"Let's review your discharge instructions.  What is/are the follow-up recommendations?  Pt. Response: Lymphedema        \"Has an appointment with your primary care provider been scheduled?\"   No (schedule appointment)    \"When you see the provider, I would recommend that you bring your medications with you.\"    Medications    \"Tell me what changed about your medicines when you discharged?\"    Changes to chronic meds?    0-1    \"What questions do you have about your medications?\"    None     New diagnoses of heart failure, COPD, diabetes, or MI?    No              Medication reconciliation completed? Yes  Was MTM referral placed (*Make sure to put transitions as reason for referral)?   No    Call Summary    \"What questions or concerns do you have about your recent visit and your follow-up care?\"     none    \"If you have questions or things don't continue to improve, we encourage you contact us through the main clinic number (give number).  Even if the clinic is not open, triage nurses are available 24/7 to help you.     We would like you to know that our clinic has extended hours (provide information).  We also have urgent care (provide details on closest location and hours/contact info)\"      \"Thank you for your time and take care!\"             "

## 2019-01-28 ENCOUNTER — OFFICE VISIT (OUTPATIENT)
Dept: FAMILY MEDICINE | Facility: CLINIC | Age: 59
End: 2019-01-28
Payer: COMMERCIAL

## 2019-01-28 VITALS
BODY MASS INDEX: 41.75 KG/M2 | WEIGHT: 315 LBS | SYSTOLIC BLOOD PRESSURE: 118 MMHG | DIASTOLIC BLOOD PRESSURE: 63 MMHG | HEIGHT: 73 IN | HEART RATE: 95 BPM | TEMPERATURE: 98.5 F

## 2019-01-28 DIAGNOSIS — L71.9 ROSACEA: ICD-10-CM

## 2019-01-28 DIAGNOSIS — L03.116 CELLULITIS OF LEFT LOWER EXTREMITY: Primary | ICD-10-CM

## 2019-01-28 PROCEDURE — 99495 TRANSJ CARE MGMT MOD F2F 14D: CPT | Performed by: INTERNAL MEDICINE

## 2019-01-28 RX ORDER — DOXYCYCLINE 100 MG/1
100 CAPSULE ORAL 2 TIMES DAILY
Qty: 60 CAPSULE | Refills: 0 | Status: SHIPPED | OUTPATIENT
Start: 2019-01-28 | End: 2019-04-24

## 2019-01-28 ASSESSMENT — MIFFLIN-ST. JEOR: SCORE: 2313.58

## 2019-01-28 NOTE — PATIENT INSTRUCTIONS
Complete Augmentin antibiotic treatment.  Call doctor if you develop any side effects from the medication or if your leg redness persists/worsens.    Seek immediate medical attention if you develop fever, nausea/vomiting, diarrhea, increasing weakness.    Examine you feet everyday.    Follow up in 1 month for your full physical exam (please come in fasting).

## 2019-01-28 NOTE — PROGRESS NOTES
HPI      SUBJECTIVE:   Peterson Vazquez is a 58 year old male who presents to clinic today for the following health issues:      Hospital Follow-up Visit:    Hospital/Nursing Home/IP Rehab Facility: [facility]  Date of Admission: 1-  Date of Discharge: 1-  Reason(s) for Admission: Cellulitis of left lower extremity            Problems taking medications regularly:  None       Medication changes since discharge: None       Problems adhering to non-medication therapy:  None    Summary of hospitalization:  [facility] discharge summary reviewed  Diagnostic Tests/Treatments reviewed.  Follow up needed: none  Other Healthcare Providers Involved in Patient s Care:         Homecare  Update since discharge: improved.     Post Discharge Medication Reconciliation: discharge medications reconciled and changed, per note/orders (see AVS).  Plan of care communicated with patient     Coding guidelines for this visit:  Type of Medical   Decision Making Face-to-Face Visit       within 7 Days of discharge Face-to-Face Visit        within 14 days of discharge   Moderate Complexity [id number]   High Complexity [id number]            Since the patient's discharge from the hospital, he has noted steady improvement in the redness and swelling in his left lower leg.  He denies fever/chills or any significant pain at his leg.  Continues to be on Augmentin and is tolerating the medication well.  He is concerned that he is about to complete his antibiotic treatment and the erythema is not close to being completely resolved.    As a second concern, he also complains of rosacea for which he was previously taking minocycline.      Past Medical History:   Diagnosis Date     Cellulitis of left lower extremity 1/18/2019     Chronic acquired lymphedema 1/19/2019     Chronic rhinitis      DVT (deep venous thrombosis) (H)     R peroneal vein in 2016     History of depression 2003     HTN (hypertension) 4/30/2013  "    Morbid obesity (H)      Other and unspecified hyperlipidemia      Type II or unspecified type diabetes mellitus without mention of complication, not stated as uncontrolled 7-05       Review of Systems   Constitutional: Negative for chills, fever and malaise/fatigue.   Respiratory: Negative for shortness of breath.    Gastrointestinal: Negative for abdominal pain, diarrhea, nausea and vomiting.       /63 (BP Location: Right arm, Cuff Size: Adult Large)   Pulse 95   Temp 98.5  F (36.9  C) (Oral)   Ht 1.854 m (6' 1\")   Wt 145 kg (319 lb 9.6 oz)   BMI 42.17 kg/m        Physical Exam   Constitutional: He is oriented to person, place, and time. No distress.   Pulmonary/Chest: Effort normal. No respiratory distress.   Musculoskeletal: Normal range of motion. He exhibits edema (mild at left lower leg). He exhibits no tenderness.   Neurological: He is alert and oriented to person, place, and time.   Skin: Rash (facial erythematous maculopapular lesions distributed at the forehead and nasal area) noted. There is erythema (mild at left lower leg).   Vitals reviewed.        ICD-10-CM    1. Cellulitis of left lower extremity L03.116 doxycycline hyclate (VIBRAMYCIN) 100 MG capsule   2. Rosacea L71.9 doxycycline hyclate (VIBRAMYCIN) 100 MG capsule       Patient Instructions   Complete Augmentin antibiotic treatment.  Call doctor if you develop any side effects from the medication or if your leg redness persists/worsens.    Seek immediate medical attention if you develop fever, nausea/vomiting, diarrhea, increasing weakness.    Examine you feet everyday.    Follow up in 1 month for your full physical exam (please come in fasting).          "

## 2019-01-31 ENCOUNTER — HOSPITAL ENCOUNTER (OUTPATIENT)
Dept: OCCUPATIONAL THERAPY | Facility: CLINIC | Age: 59
Setting detail: THERAPIES SERIES
End: 2019-01-31
Attending: INTERNAL MEDICINE
Payer: COMMERCIAL

## 2019-01-31 DIAGNOSIS — I89.0 CHRONIC ACQUIRED LYMPHEDEMA: Chronic | ICD-10-CM

## 2019-01-31 DIAGNOSIS — I89.0 LYMPHEDEMA OF BOTH LOWER EXTREMITIES: ICD-10-CM

## 2019-01-31 DIAGNOSIS — L03.116 CELLULITIS OF LEFT LOWER EXTREMITY: ICD-10-CM

## 2019-01-31 DIAGNOSIS — L03.116 LEFT LEG CELLULITIS: ICD-10-CM

## 2019-01-31 PROCEDURE — 97165 OT EVAL LOW COMPLEX 30 MIN: CPT | Mod: GO

## 2019-01-31 PROCEDURE — 97535 SELF CARE MNGMENT TRAINING: CPT | Mod: GO

## 2019-02-01 ENCOUNTER — TELEPHONE (OUTPATIENT)
Dept: LAB | Facility: CLINIC | Age: 59
End: 2019-02-01

## 2019-02-01 DIAGNOSIS — L03.116 CELLULITIS OF LEFT LOWER EXTREMITY: ICD-10-CM

## 2019-02-01 NOTE — TELEPHONE ENCOUNTER
Pending Prescriptions:                       Disp   Refills    amoxicillin-clavulanate (AUGMENTIN) 875-1*20 tab*0            Sig: Take 1 tablet by mouth 2 times daily    Last Written Prescription Date:  1/23/2019 Written by   The patient said that he still has some redness and Warmth and he was told that he could get this RX extended    Last Fill Quantity: na,  # refills: na   Last office visit:  01/28/2019  Future Office Visit:

## 2019-02-01 NOTE — TELEPHONE ENCOUNTER
Reason for Call:  Medication or medication refill:    Do you use a [facility]?  Name of the pharmacy and phone number for the current request:      [facility] [address] [place] [address] AT [address] [place] [address]      Name of the medication requested: amoxicillin-clavulanate (AUGMENTIN) 875-125 MG tablet    Other request: The patient said that he still has some redness and Warmth and he was told that he could get this RX extended  Please send New RX    Can we leave a detailed message on this number? YES    Phone number patient can be reached at: Home number on file [phone] (home)    Best Time: anytime    Call taken on 2/1/2019 at 2:55 PM by Sabra Mercado

## 2019-02-02 ENCOUNTER — NURSE TRIAGE (OUTPATIENT)
Dept: NURSING | Facility: CLINIC | Age: 59
End: 2019-02-02

## 2019-02-02 DIAGNOSIS — L03.116 CELLULITIS OF LEFT LOWER EXTREMITY: ICD-10-CM

## 2019-02-02 NOTE — TELEPHONE ENCOUNTER
Clinic Action Needed:No  Reason for Call: Pt calling as he only has one Augmentin left and Dr Silva ask him to call if there was still redness on his leg from the cellulitis at the end of the Augmentin. Pt says the leg is much improved, but there is still some redness and heat. He is also on doxycycline 100 mg BID for rosacea. 11:54 am Via Smart web, I paged Dr Coles, on call for [facility] clinic today. She advised to stay on doxycycline 100 mg BID and not to fill the Augmentin today. She will send Dr Silva a note to discussed this. Pt to call back if leg symptoms increase or new symptoms such as fever over 100 appear. Pt sounding very capable to follow directions and will call [facility] back 24/7 this weekend if any concerns or questions.   Routed to: None    Blank Tapia RN, [facility]

## 2019-02-04 ENCOUNTER — TELEPHONE (OUTPATIENT)
Dept: LAB | Facility: CLINIC | Age: 59
End: 2019-02-04

## 2019-02-04 NOTE — TELEPHONE ENCOUNTER
Reason for Call:  Other call back    Detailed comments: Please send RX for amoxicillin-clavulanate (AUGMENTIN) 875-1*20 tab*0          Please call PT back     Phone Number Patient can be reached at: Home number on file [phone] (home)    Best Time: any    Can we leave a detailed message on this number? YES    Call taken on 2/4/2019 at 2:00 PM by Anushka Delong

## 2019-02-04 NOTE — TELEPHONE ENCOUNTER
"Called patient.   Patient reports completed RX Augmentin for lower leg cellulitis on Sat night.    States he was to contact Dr Silva if symptoms not completely resolved at end of abx treatment.   Patient tried to request new RX Augmentin Sat from on-call provider, however was advised to contact Dr Silva on Monday.     Current symptoms:  Ongoing redness and warmth.   Patient states symptoms \"definetly better, but not cleared up\".   No fever or drainage.    Area has lymphedema wrap.     Patient is taking Doxycycline 100gm caps: 1 cap BID for different problem, but doesn't think it will cover for patient's current strep cellulitis infection.     Rx Augmentin pended for provider review and approval, if OK.     Please advise.     Tanja JOSÉ RN,BSN    "

## 2019-02-10 ENCOUNTER — TELEPHONE (OUTPATIENT)
Dept: FAMILY MEDICINE | Facility: CLINIC | Age: 59
End: 2019-02-10

## 2019-02-10 ENCOUNTER — MYC MEDICAL ADVICE (OUTPATIENT)
Dept: FAMILY MEDICINE | Facility: CLINIC | Age: 59
End: 2019-02-10

## 2019-02-10 ASSESSMENT — ENCOUNTER SYMPTOMS
CHILLS: 0
VOMITING: 0
NAUSEA: 0
ABDOMINAL PAIN: 0
FEVER: 0
SHORTNESS OF BREATH: 0
DIARRHEA: 0

## 2019-02-11 ENCOUNTER — HOSPITAL ENCOUNTER (OUTPATIENT)
Dept: OCCUPATIONAL THERAPY | Facility: CLINIC | Age: 59
Setting detail: THERAPIES SERIES
End: 2019-02-11
Attending: INTERNAL MEDICINE
Payer: COMMERCIAL

## 2019-02-11 PROCEDURE — 97140 MANUAL THERAPY 1/> REGIONS: CPT | Mod: GO

## 2019-02-11 NOTE — TELEPHONE ENCOUNTER
Dr Silva,     Please see [employer] message and provide advise.    Thank you,  Tanja JOSÉ RN,BSN

## 2019-02-12 ENCOUNTER — HOSPITAL ENCOUNTER (OUTPATIENT)
Dept: OCCUPATIONAL THERAPY | Facility: CLINIC | Age: 59
Setting detail: THERAPIES SERIES
End: 2019-02-12
Attending: INTERNAL MEDICINE
Payer: COMMERCIAL

## 2019-02-12 PROCEDURE — 97140 MANUAL THERAPY 1/> REGIONS: CPT | Mod: GO

## 2019-02-12 NOTE — TELEPHONE ENCOUNTER
Dr. Silva,    Please see [employer] message below. Pt believes he was to ask you for an extension of the Augmentin if the redness is not gone. He has requested the refill/ extension. See other encounter and [employer] message below.  Please advise.    Thank you,  Catrachito FAGAN RN

## 2019-02-13 ENCOUNTER — OFFICE VISIT (OUTPATIENT)
Dept: FAMILY MEDICINE | Facility: CLINIC | Age: 59
End: 2019-02-13
Payer: COMMERCIAL

## 2019-02-13 ENCOUNTER — HOSPITAL ENCOUNTER (OUTPATIENT)
Dept: OCCUPATIONAL THERAPY | Facility: CLINIC | Age: 59
Setting detail: THERAPIES SERIES
End: 2019-02-13
Attending: INTERNAL MEDICINE
Payer: COMMERCIAL

## 2019-02-13 VITALS
HEART RATE: 96 BPM | BODY MASS INDEX: 41.75 KG/M2 | TEMPERATURE: 97.4 F | SYSTOLIC BLOOD PRESSURE: 133 MMHG | HEIGHT: 73 IN | DIASTOLIC BLOOD PRESSURE: 79 MMHG | WEIGHT: 315 LBS | OXYGEN SATURATION: 95 %

## 2019-02-13 DIAGNOSIS — E66.01 MORBID OBESITY (H): ICD-10-CM

## 2019-02-13 DIAGNOSIS — E11.42 DIABETIC POLYNEUROPATHY ASSOCIATED WITH TYPE 2 DIABETES MELLITUS (H): ICD-10-CM

## 2019-02-13 DIAGNOSIS — L03.116 CELLULITIS OF LEFT LOWER EXTREMITY: Primary | ICD-10-CM

## 2019-02-13 PROCEDURE — 99214 OFFICE O/P EST MOD 30 MIN: CPT | Performed by: INTERNAL MEDICINE

## 2019-02-13 PROCEDURE — 97140 MANUAL THERAPY 1/> REGIONS: CPT | Mod: GO

## 2019-02-13 PROCEDURE — 97535 SELF CARE MNGMENT TRAINING: CPT | Mod: GO

## 2019-02-13 RX ORDER — CEPHALEXIN 500 MG/1
500 CAPSULE ORAL 2 TIMES DAILY
Qty: 20 CAPSULE | Refills: 0 | Status: SHIPPED | OUTPATIENT
Start: 2019-02-13 | End: 2019-02-22

## 2019-02-13 ASSESSMENT — MIFFLIN-ST. JEOR: SCORE: 2314.48

## 2019-02-13 NOTE — TELEPHONE ENCOUNTER
Patient called this morning very upset that he still has gotten NO response from Dr. Silva regarding the Cellulitis in his leg.      Patient is very frustrated that there does seem to be some infection in the leg, but nothing has been done about it.  It is causing issues with his therapy.    Please call patient TODAY!    Cell:  [phone] if calling before noon    OR    Work:   [phone] this afternoon.    OK to leave VM     Patient will be asking to speak to a supervisor if he does not get a call back today.

## 2019-02-13 NOTE — PATIENT INSTRUCTIONS
Start Cephalexin.    Call doctor if your leg redness worsens, or if you develop new symptoms such as fever, nausea/vomiting, diarrhea.    Follow up in 1 week.

## 2019-02-13 NOTE — PROGRESS NOTES
"HPI      SUBJECTIVE:   Peterson Vazquez is a 58 year old male who presents to clinic today for the following health issues:      I last saw the patient at the clinic on 1/28/2019 due to cellulitis of left lower extremity.  Advised patient to complete his Augmentin antibiotic treatment and to start doxycycline thereafter.  Also referred patient to occupational therapy for lymphedema treatment.    Since his last clinic visit, he has noted improvement of the left lower leg cellulitis; however, he has noted residual redness and increased warmth.  Denies severe pain.      Past Medical History:   Diagnosis Date     Cellulitis of left lower extremity 1/18/2019     Chronic acquired lymphedema 1/19/2019     Chronic rhinitis      DVT (deep venous thrombosis) (H)     R peroneal vein in 2016     History of depression 2003     HTN (hypertension) 4/30/2013     Morbid obesity (H)      Other and unspecified hyperlipidemia      Type II or unspecified type diabetes mellitus without mention of complication, not stated as uncontrolled 7-05       Review of Systems   Constitutional: Negative for chills, fever and malaise/fatigue.   Respiratory: Negative for shortness of breath.    Cardiovascular: Positive for leg swelling.   Gastrointestinal: Negative for abdominal pain, diarrhea, nausea and vomiting.       /79 (BP Location: Right arm, Cuff Size: Adult Large)   Pulse 96   Temp 97.4  F (36.3  C) (Oral)   Ht 1.854 m (6' 1\")   Wt 145.1 kg (319 lb 12.8 oz)   SpO2 95%   BMI 42.19 kg/m        Physical Exam   Constitutional: He is oriented to person, place, and time. No distress.   Pulmonary/Chest: Effort normal. No respiratory distress.   Musculoskeletal: He exhibits edema.   Neurological: He is oriented to person, place, and time.   Skin: There is erythema.   Vitals reviewed.        ICD-10-CM    1. Cellulitis of left lower extremity L03.116 cephALEXin (KEFLEX) 500 MG capsule   2. Diabetic polyneuropathy associated with type 2 " diabetes mellitus (H) E11.42 continue control with metformin   3. Morbid obesity (H) E66.01 diet and exercise       Patient Instructions   Start Cephalexin.    Call doctor if your leg redness worsens, or if you develop new symptoms such as fever, nausea/vomiting, diarrhea.    Follow up in 1 week.

## 2019-02-13 NOTE — TELEPHONE ENCOUNTER
Bridger Silva MD   to [facility] Triage   Me        9:33 AM   Note      I need to re-examine the leg.  May double book 1, 2:30, or 4pm slots to squeeze patient in today

## 2019-02-13 NOTE — TELEPHONE ENCOUNTER
Don't close encounter. PT.WAITING FOR NEW RX AUGMENTIN for persistent cellulitis(there is an open myChart encounter also.).  He has been waiting for a few days.  Thank you,  Jess Healy RN

## 2019-02-14 ENCOUNTER — HOSPITAL ENCOUNTER (OUTPATIENT)
Dept: OCCUPATIONAL THERAPY | Facility: CLINIC | Age: 59
Setting detail: THERAPIES SERIES
End: 2019-02-14
Attending: INTERNAL MEDICINE
Payer: COMMERCIAL

## 2019-02-14 PROCEDURE — 97140 MANUAL THERAPY 1/> REGIONS: CPT | Mod: GO | Performed by: OCCUPATIONAL THERAPIST

## 2019-02-15 ENCOUNTER — HOSPITAL ENCOUNTER (OUTPATIENT)
Dept: OCCUPATIONAL THERAPY | Facility: CLINIC | Age: 59
Setting detail: THERAPIES SERIES
End: 2019-02-15
Attending: INTERNAL MEDICINE
Payer: COMMERCIAL

## 2019-02-15 PROCEDURE — 97140 MANUAL THERAPY 1/> REGIONS: CPT | Mod: GO | Performed by: OCCUPATIONAL THERAPIST

## 2019-02-18 ENCOUNTER — HOSPITAL ENCOUNTER (OUTPATIENT)
Dept: OCCUPATIONAL THERAPY | Facility: CLINIC | Age: 59
Setting detail: THERAPIES SERIES
End: 2019-02-18
Attending: INTERNAL MEDICINE
Payer: COMMERCIAL

## 2019-02-18 PROCEDURE — 97140 MANUAL THERAPY 1/> REGIONS: CPT | Mod: GO | Performed by: OCCUPATIONAL THERAPIST

## 2019-02-18 NOTE — ADDENDUM NOTE
Encounter addended by: Ana Fernandez, OT on: 2/18/2019 10:38 AM   Actions taken: Flowsheet data copied forward, Flowsheet accepted, Charge Capture section accepted

## 2019-02-19 ENCOUNTER — HOSPITAL ENCOUNTER (OUTPATIENT)
Dept: OCCUPATIONAL THERAPY | Facility: CLINIC | Age: 59
Setting detail: THERAPIES SERIES
End: 2019-02-19
Attending: INTERNAL MEDICINE
Payer: COMMERCIAL

## 2019-02-19 PROCEDURE — 97140 MANUAL THERAPY 1/> REGIONS: CPT | Mod: GO

## 2019-02-21 ENCOUNTER — HOSPITAL ENCOUNTER (OUTPATIENT)
Dept: OCCUPATIONAL THERAPY | Facility: CLINIC | Age: 59
Setting detail: THERAPIES SERIES
End: 2019-02-21
Attending: INTERNAL MEDICINE
Payer: COMMERCIAL

## 2019-02-21 PROCEDURE — 97140 MANUAL THERAPY 1/> REGIONS: CPT | Mod: GO

## 2019-02-22 ENCOUNTER — TELEPHONE (OUTPATIENT)
Dept: FAMILY MEDICINE | Facility: CLINIC | Age: 59
End: 2019-02-22

## 2019-02-22 ENCOUNTER — HOSPITAL ENCOUNTER (OUTPATIENT)
Dept: OCCUPATIONAL THERAPY | Facility: CLINIC | Age: 59
Setting detail: THERAPIES SERIES
End: 2019-02-22
Attending: INTERNAL MEDICINE
Payer: COMMERCIAL

## 2019-02-22 DIAGNOSIS — L03.116 CELLULITIS OF LEFT LOWER EXTREMITY: ICD-10-CM

## 2019-02-22 DIAGNOSIS — R60.0 PEDAL EDEMA: Primary | ICD-10-CM

## 2019-02-22 PROCEDURE — 97140 MANUAL THERAPY 1/> REGIONS: CPT | Mod: GO

## 2019-02-22 RX ORDER — CEPHALEXIN 500 MG/1
500 CAPSULE ORAL 2 TIMES DAILY
Qty: 20 CAPSULE | Refills: 0 | Status: SHIPPED | OUTPATIENT
Start: 2019-02-22 | End: 2019-04-24

## 2019-02-22 NOTE — TELEPHONE ENCOUNTER
Reason for Call:  Medication or medication refill:    Do you use a [facility]?  Name of the pharmacy and phone number for the current request:       .  [facility] [address] [place] [address] AT [address] [place] [address]    Name of the medication requested: cephALEXin (KEFLEX) 500 MG capsule    Other request: pt still has infection in leg/ has made follow up appt also    Can we leave a detailed message on this number? YES    Phone number patient can be reached at: Home number on file [phone] (home)    Best Time:     Call taken on 2/22/2019 at 12:03 PM by Dax Aquino

## 2019-02-22 NOTE — TELEPHONE ENCOUNTER
Spoke with patient.      Requesting RX Keflex refill for cellulitis.     Reports symptoms improved, however not resolved.  Still has redness and tenderness.  Confirmed by wound care this morning that patient needs ongoing Rx abx.     Patient has 1-2 days remaining of the Keflex, however wants refill sent to pharmacy today so that he doesn't have a gap in antibiotic treatment.     Pended.  Please review and complete.     Tanja JOSÉ RN,BSN

## 2019-02-22 NOTE — TELEPHONE ENCOUNTER
Routing to Dr Silva.     Pended order for compression socks for this patient.      Please review and complete.      (Not sure of quantity you'd want patient to have with this order).     Tanja JOSÉ RN,BSN

## 2019-02-22 NOTE — TELEPHONE ENCOUNTER
Reason for Call: Request for an order or referral:    Order or referral being requested:   BBK 20-30 MM COMPRESSION SOCKS/ BBK FARROW CLASSIC VELCRO STRAP BINDERS WITH FOOT PIECES AND SOCK LINERS/ HYBRID SOCK    Date needed: as soon as possible    Has the patient been seen by the PCP for this problem? YES    Additional comments: PT NEEDS SCRIPT FOR THESE ITEMS  [facility] [phone]    Phone number Patient can be reached at:  Work number on file:  [phone] (work)    Best Time:      Can we leave a detailed message on this number?  YES    Call taken on 2/22/2019 at 11:58 AM by Dax Aquino

## 2019-02-25 ASSESSMENT — ENCOUNTER SYMPTOMS
ABDOMINAL PAIN: 0
FEVER: 0
DIARRHEA: 0
VOMITING: 0
NAUSEA: 0
CHILLS: 0
SHORTNESS OF BREATH: 0

## 2019-03-04 ENCOUNTER — OFFICE VISIT (OUTPATIENT)
Dept: FAMILY MEDICINE | Facility: CLINIC | Age: 59
End: 2019-03-04
Payer: COMMERCIAL

## 2019-03-04 VITALS
TEMPERATURE: 98.1 F | HEART RATE: 85 BPM | SYSTOLIC BLOOD PRESSURE: 131 MMHG | HEIGHT: 73 IN | OXYGEN SATURATION: 95 % | WEIGHT: 315 LBS | BODY MASS INDEX: 41.75 KG/M2 | DIASTOLIC BLOOD PRESSURE: 73 MMHG

## 2019-03-04 DIAGNOSIS — L71.9 ROSACEA: ICD-10-CM

## 2019-03-04 DIAGNOSIS — I87.2 VENOUS STASIS DERMATITIS OF LEFT LOWER EXTREMITY: Primary | ICD-10-CM

## 2019-03-04 DIAGNOSIS — Z12.11 COLON CANCER SCREENING: ICD-10-CM

## 2019-03-04 DIAGNOSIS — E11.21 TYPE 2 DIABETES MELLITUS WITH DIABETIC NEPHROPATHY, WITHOUT LONG-TERM CURRENT USE OF INSULIN (H): Chronic | ICD-10-CM

## 2019-03-04 DIAGNOSIS — I10 ESSENTIAL HYPERTENSION: Chronic | ICD-10-CM

## 2019-03-04 LAB
BASOPHILS # BLD AUTO: 0 10E9/L (ref 0–0.2)
BASOPHILS NFR BLD AUTO: 0.2 %
DIFFERENTIAL METHOD BLD: ABNORMAL
EOSINOPHIL # BLD AUTO: 0.1 10E9/L (ref 0–0.7)
EOSINOPHIL NFR BLD AUTO: 0.9 %
ERYTHROCYTE [DISTWIDTH] IN BLOOD BY AUTOMATED COUNT: 13.3 % (ref 10–15)
HCT VFR BLD AUTO: 40 % (ref 40–53)
HGB BLD-MCNC: 13 G/DL (ref 13.3–17.7)
LYMPHOCYTES # BLD AUTO: 1.6 10E9/L (ref 0.8–5.3)
LYMPHOCYTES NFR BLD AUTO: 18.6 %
MCH RBC QN AUTO: 30.4 PG (ref 26.5–33)
MCHC RBC AUTO-ENTMCNC: 32.5 G/DL (ref 31.5–36.5)
MCV RBC AUTO: 94 FL (ref 78–100)
MONOCYTES # BLD AUTO: 0.7 10E9/L (ref 0–1.3)
MONOCYTES NFR BLD AUTO: 8.2 %
NEUTROPHILS # BLD AUTO: 6.1 10E9/L (ref 1.6–8.3)
NEUTROPHILS NFR BLD AUTO: 72.1 %
PLATELET # BLD AUTO: 204 10E9/L (ref 150–450)
RBC # BLD AUTO: 4.27 10E12/L (ref 4.4–5.9)
WBC # BLD AUTO: 8.5 10E9/L (ref 4–11)

## 2019-03-04 PROCEDURE — 85025 COMPLETE CBC W/AUTO DIFF WBC: CPT | Performed by: INTERNAL MEDICINE

## 2019-03-04 PROCEDURE — 99214 OFFICE O/P EST MOD 30 MIN: CPT | Performed by: INTERNAL MEDICINE

## 2019-03-04 PROCEDURE — 36415 COLL VENOUS BLD VENIPUNCTURE: CPT | Performed by: INTERNAL MEDICINE

## 2019-03-04 PROCEDURE — 83721 ASSAY OF BLOOD LIPOPROTEIN: CPT | Performed by: INTERNAL MEDICINE

## 2019-03-04 RX ORDER — LISINOPRIL 10 MG/1
10 TABLET ORAL DAILY
Qty: 90 TABLET | Refills: 1 | Status: SHIPPED | OUTPATIENT
Start: 2019-03-04 | End: 2019-06-13

## 2019-03-04 RX ORDER — ATORVASTATIN CALCIUM 40 MG/1
40 TABLET, FILM COATED ORAL DAILY
Qty: 90 TABLET | Refills: 1 | Status: SHIPPED | OUTPATIENT
Start: 2019-03-04 | End: 2019-06-13

## 2019-03-04 RX ORDER — METRONIDAZOLE 10 MG/G
GEL TOPICAL DAILY
Qty: 60 G | Refills: 11 | Status: SHIPPED | OUTPATIENT
Start: 2019-03-04

## 2019-03-04 RX ORDER — TRIAMCINOLONE ACETONIDE 1 MG/G
CREAM TOPICAL 3 TIMES DAILY
Qty: 453.6 G | Refills: 1 | Status: SHIPPED | OUTPATIENT
Start: 2019-03-04 | End: 2020-10-27

## 2019-03-04 RX ORDER — METFORMIN HCL 500 MG
1000 TABLET, EXTENDED RELEASE 24 HR ORAL 2 TIMES DAILY WITH MEALS
Qty: 360 TABLET | Refills: 1 | Status: SHIPPED | OUTPATIENT
Start: 2019-03-04 | End: 2019-10-20

## 2019-03-04 ASSESSMENT — MIFFLIN-ST. JEOR: SCORE: 2339.88

## 2019-03-04 NOTE — PROGRESS NOTES
"HPI    SUBJECTIVE:   Peterson Vazquez is a 58 year old male who presents to clinic today for the following health issues:      I last saw the patient at the clinic on 1/28/2019 as follow-up for his left lower leg cellulitis which has improved after treatment with Augmentin followed by doxycycline.  However, he still had residual redness so I gave him a prescription for cephalexin.    Since his last clinic visit, he still has residual erythema particularly at the anterior aspect of the left lower leg.  He states that he feels better overall and is not experiencing any severe pain at the affected area.  No fever/chills or malaise.    Patient also needs refills for his medications:  --Metformin and atorvastatin for his diabetes  --Lisinopril for his hypertension    He has been taking oral minocycline (and recently, doxycycline) for his rosacea which appears to be doing well at this time.  Has not tried topical agents before.      Past Medical History:   Diagnosis Date     Cellulitis of left lower extremity 1/18/2019     Chronic acquired lymphedema 1/19/2019     Chronic rhinitis      DVT (deep venous thrombosis) (H)     R peroneal vein in 2016     History of depression 2003     HTN (hypertension) 4/30/2013     Morbid obesity (H)      Other and unspecified hyperlipidemia      Type II or unspecified type diabetes mellitus without mention of complication, not stated as uncontrolled 7-05       Review of Systems   Constitutional: Negative for chills, fever, malaise/fatigue and weight loss.   Respiratory: Negative for shortness of breath.    Cardiovascular: Positive for leg swelling. Negative for chest pain and palpitations.   Gastrointestinal: Negative for diarrhea, nausea and vomiting.   Neurological: Negative for dizziness.       /73 (BP Location: Left arm, Cuff Size: Adult Large)   Pulse 85   Temp 98.1  F (36.7  C) (Oral)   Ht 1.854 m (6' 1\")   Wt 147.6 kg (325 lb 6.4 oz)   SpO2 95%   BMI 42.93 kg/m  "       Physical Exam   Constitutional: He is oriented to person, place, and time. No distress.   Pulmonary/Chest: Effort normal. No respiratory distress.   Musculoskeletal: Normal range of motion. He exhibits edema and tenderness (mild at anterior of left lower leg).   Neurological: He is alert and oriented to person, place, and time. No sensory deficit.   Skin: Skin is warm.   No abscess or bullae   Nursing note and vitals reviewed.        ICD-10-CM    1. Venous stasis dermatitis of left lower extremity -- causing persistent inflammation; patient has had adequate antibiotic treatment and has not been exhibiting any systemic symptoms of infection I87.2 triamcinolone (KENALOG) 0.1 % external cream     CBC with platelets differential   2. Type 2 diabetes mellitus with diabetic nephropathy, without long-term current use of insulin (H) E11.21 atorvastatin (LIPITOR) 40 MG tablet     metFORMIN (GLUCOPHAGE-XR) 500 MG 24 hr tablet     LDL cholesterol direct   3. Essential hypertension; goal < 140/90 I10 lisinopril (PRINIVIL/ZESTRIL) 10 MG tablet   4. Colon cancer screening Z12.11 Fecal colorectal cancer screen FIT   5. Rosacea L71.9 metroNIDAZOLE (METROGEL) 1 % external gel       Patient Instructions   Apply prescribed steroid cream thinly over affected area at left lower leg.    Call doctor if the area of redness worsens or becomes more painful.    Seek immediate medical attention if you develop fever/chills, nausea/vomiting, increasing weakness.    Don't forget to return your stool sample.    Follow-up in 2 weeks.

## 2019-03-04 NOTE — PATIENT INSTRUCTIONS
Apply prescribed steroid cream thinly over affected area at left lower leg.    Call doctor if the area of redness worsens or becomes more painful.    Seek immediate medical attention if you develop fever/chills, nausea/vomiting, increasing weakness.    Don't forget to return your stool sample.    Follow-up in 2 weeks.

## 2019-03-05 LAB — LDLC SERPL DIRECT ASSAY-MCNC: 99 MG/DL

## 2019-03-19 ENCOUNTER — HOSPITAL ENCOUNTER (OUTPATIENT)
Dept: OCCUPATIONAL THERAPY | Facility: CLINIC | Age: 59
Setting detail: THERAPIES SERIES
End: 2019-03-19
Attending: INTERNAL MEDICINE
Payer: COMMERCIAL

## 2019-03-19 PROCEDURE — 97140 MANUAL THERAPY 1/> REGIONS: CPT | Mod: GO

## 2019-03-19 PROCEDURE — 97535 SELF CARE MNGMENT TRAINING: CPT | Mod: GO

## 2019-03-19 ASSESSMENT — ENCOUNTER SYMPTOMS
SHORTNESS OF BREATH: 0
WEIGHT LOSS: 0
PALPITATIONS: 0
NAUSEA: 0
DIZZINESS: 0
VOMITING: 0

## 2019-03-19 NOTE — PROGRESS NOTES
"Addendum to discharge note:  Patient presents 8/14/2019 with exacerbation of BBK lymphedema, 3+ pitting BL pre-tibial, 2+ BL dorsum of feet; additional week of CDT recommended, patient unable to schedule at this time.  Recommended improved compliance with home program, follow up in one month.   03/19/19 0840   Signing Clinician's Name / Credentials   Signing clinician's name / credentials Kapil Johnson, OTR/L, CLT   Session Number   Session Number DISCHARGE NOTE   Goal 1   Goal identifier volume   Goal description For decreased risk of infection, skin breakdown/wounds & progressive soft-tissue fibrosis and improved fit of footwear, volume will be reduced by 400 mL in LBK, 250 mL RBK.   Target date 03/29/19   Date met 03/19/19  (GOAL NOT MET RBK, EXCEEDED LBK)   Goal 2   Goal identifier GCB   Goal description To reduce volume of lymphedema and risk of soft tissue fibrosis, pt will tolerate up to 23hr/day wear gradient compression bandaging (GCB) of BBK.   Target date 03/29/19   Date met 02/22/19  (GOAL MET)   Goal 3   Goal identifier home program   Goal description For long-term home mgmt chronic lymphedema pt/caregiver independent in home program a. GCB/GCB alternative garment for night wear b. compression garment for day wear c. ex to incr lymph flow/self-MLD.   Target date 03/29/19   Date met 03/19/19  (GOAL MET)   Goal 4   Goal identifier precautions   Goal description Pt will independently verbalize the signs/symptoms of lymphedema, precautions and how to obtain a future lymphedema referral if edema persists to preserve skin integrity, prevent infection and preserve functional mobility.      Target date 03/29/19   Date met 01/31/19  (GOAL MET)   Subjective Report   Subjective Report \"I didn't get both velcro binders until about a weeke ago, so I had to swap out legs each day.  I really like them, though, I think I've got it down.  And best yet, the infection is officially gone!\"   Objective Measure 1   Objective " Measure BBK volume   Details see attached flowsheet: vs. initial measurements, LBK has reduced in volume by 710 mL, difference BBK now 7% vs. 33%   System Outcome Measures   Lymphedema Life Impact Scale (score range 0-72). A higher score indicates greater impairment. 7   Self Care/Home Management   Treatment Detail instructed patient in care/wear and replacement schedule of garments, instructed in proper donning/doffing, ed re: s/s of infection, lymphedema exacerbation, how to obtain future referral if needed   Self-Care/Home Mgmt/ADL, Compensatory, Meal Prep Minutes (55913) 15 Minutes   Skilled Intervention ED   Patient Response patient verbalized understaninding, feels confident re: HP   Progress ALL GOALS MET   Manual Therapy   Manual Therapy Minutes (84392) 15   Skilled Intervention measurements   Patient Response patient pleased with volume loss LBK, increase RBK likely due to insufficient compression over past several weeks (patient received only one velcro binder from , did not bandage)   Treatment Detail BBK measurements   Progress ALL GOALS MET   Plan   Home program BBK compression socks for day wear, night time Farrow classic compression binders with footpiece with option for hybrid socks with binders for day time   Updates to plan of care DISCHARGE THIS VISIT   Comments   Comments patient verbalized understanding of s/s of infection, lymphedema precautions, feels confident re: HP, in agreement with POC, discharge this visit   Total Session Time   Timed Code Treatment Minutes 30   Total Treatment Time (sum of timed and untimed services) 30

## 2019-03-20 ASSESSMENT — ENCOUNTER SYMPTOMS
DIARRHEA: 0
CHILLS: 0
FEVER: 0

## 2019-04-23 NOTE — PROGRESS NOTES
SUBJECTIVE:   Peterson Vazquez is a 58 year old male who presents to clinic today for the following health issues:    Pt is here to establish care with Dr. Layla Murillo. New PT.     Abnormal Mood Symptoms    Duration: last 2 weeks     Description:  Depression: YES  Anxiety: YES  Panic attacks: no      Accompanying signs and symptoms: see PHQ-9 and NETTIE scores    PHQ-9 SCORE 4/25/2019   PHQ-9 Total Score 21     NETTIE-7 SCORE 4/25/2019   Total Score 15       History (similar episodes/previous evaluation): hx depression and anxiety     Precipitating or alleviating factors: relationship problems     Therapies tried and outcome: Lexapro (Escitalopram) and going to therapy     Here with wife Sandra. New to me & this clinic. Reports 15 yrs ago was depressed &  Put on fluoxetine, which he felt made him suicidal and paranoid, it was then switched to Lexapro Was on 10 mg for a while then, weaned off when better. Seen by psychologist felt was doing ok off it, then [place] out of [place] never hospitalized. Did see a psychiatrist in the past who prescribed his meds. Has been off  It a while. History vague, often saying its all in his chart but history dates back many years & in the short time I have with this new patient difficult to review epic for details.     Currently thinks been feeling like this last 6 months If not longer. He did start counseling last week & encouraged to start medication again.  Used to go to [facility], seen by dr perry, there. cellulitis e.G., told follow up doctor, PCP on maternity , assigned to another doctor, didn't like, So changed. Prefers female provider. Reports in past seen by psyche dr brasher. Notes to this name not found in epic.     Seen first by Dr Duffy in 1/2003 as a hospital follow up from [facility] & noted. On 2/2003 noted bad wave of anxiety , felt wanted to be dead & stopped Prozac & felt better. a 3/11/03 note stated He was seen on 10-3-02 with symptoms of depression and was  started on Prozac 10 mg daily. Very quickly he felt anxious and at one point felt was walking through a tunnel and felt as though someone was stalking him. He realized this was not true. At another time he felt almost suicidal. He believed both of these events were related to the Prozac and stopped the med. Since then he felt definitely less anxious though still depressed. The multiple stressors with which he presented in Oct. had escalated. He and his then wife had both been in counseling and had since , planning a divorce. At that point was started on Lexapro 10 mg & was also being evaluated for recurrent abdominal pain. Continued on Lexapro 10 mg all through 2004 & 2005. No mention of depression med in notes until 2007 note when mentioned to continue citalopram ( ? Typo) & was moving to [place].   Next seen 8/2010 at [facility] by nickie mota for a physical & noted then on tetracycline for rosacea & no longer noted to be on any antidepressant. Was just on a statin. In 2011 ad umbilical hernia surgery. In nov 2012 returned to dr Duffy for back pain & 1/30/13 reestablished care with dr Duffy. TCN no longer made/ covered & given Metrogel then. Noted also had a girlfriend at the time. In 9/2013 referred to sleep for snoring & fatigue. Seen by sleep Dr Montaño in 10/2013 & soon after that started on a CPAP. On 3/19/14 started on minocycline for rosacea. On 5/2015 seen by Dr Du for a physical & then seen 12/2015 transfer care to Dr Pradhan who was also primary for  his dad (brought in from [place] who had dementia & second wife was his primary care giver). On metformin by then. Noted care giver stress. In er 5/2/16 for kidney stones. In er admitted 6/12 to 6/14 for cellulitis right leg & associated DVT right peroneal vein. Seen for a physical 3/2017 by dr garcia. Seen by hematology Dr Chowdhury 3/2017, u/S was negative 4/2017 & eliquis discontinued as had taken for about 9 months by then. In er 6/2017 for kidney  stones. Seen by min lung for his sleep concerns,     Hx of morbid obesity, DM on asa & metformin, DM nephropathy DM polyneuropathy, HLD on Lipitor, HTN on lisinopril, AMERICA on CPAP, chronic acquired lymphedema on compression socks, hx of prior DVT right peroneal proximal calf in 2016 on eliquis then 3 months as secondary to cellulitis at that time. Seen by hematology & recheck u/S after 3 months anticoagulation showed no DVT & advised no long term anticoagulation at that time. Hx of most recent cellulitis was left lower leg in 1/2019, with prior strep & staph, hx of rosacea on minocycline in the past & on Metrogel, chronic rhinitis on Claritin, hx of prior kidney stone noted stones in bladder & left kidney an ct 2017, prior tooth extraction, tonsillectomy, on vit d, B 12, magnesium, turmeric, intolerant to fluoxetine, normal stress test in 2011, seen by prior PCP Dr Pradhan 4/26/18 for a physical & noted then was on a keto diet. Admitted then 1/18 to 1/23/19 for cellulitis secondary O a laceration in left foot sole. U/S negative for DVT, Cx grew staph  GBS strep, evaluated by ID, HBA1c was 6.7 at the time, given vanc & zosyn then ceftriaxone then discharged on Augmentin & lymphedema treatment. Seen by Dr Woods 1/28 hospital follow up & doxycycline given. On 2/13 given keflex on patients insistence that cellulitis still there, had mostly venous stasis dermatitis. Seen 3/4 & no infection sen. given Metrogel for rosacea, LDL was elevated at 99 & cbc was normal. Did lymphedema treatment with good results & discharged wearing compressions socks from OT on 3/19.    Would like his lungs checked as felt fluid left side. No fever or chills, no headache or dizziness, no double or blurry vision, no facial pain, earache, sore throat, runny nose, post nasal drip, no trouble hearing, smelling, tasting or swallowing, no cough, no chest pain, trouble breathing or palpitations, No abdominal pain, heart burn, reflux, nausea or vomiting  or diarrhea or constipation, no blood in stools or black stools, no weight loss or night sweats. No dysuria, hematuria, frequency, urgency, hesitancy, incontinence, No pelvic complaints. No leg swelling or joint pain. No rash.    Dm: not been checking sugars, ok with labs, over due eye apt.no skin sores noted.     Declines tdap & shingrex today    Using compression socks & lymphedema stable    Using CPAP under care of sleep.   On loratadine for allergies year round but taking 2 a day.     Rosacea topical tried ten yrs ago got worse & put on minocycline. Felt minocycline worked great but has had changes since had cellulitis & minocycline not renewed. Wants to get back on it.    PHQ-9 ([employer]) 4/25/2019   1.  Little interest or pleasure in doing things 3   2.  Feeling down, depressed, or hopeless 3   3.  Trouble falling or staying asleep, or sleeping too much 0   4.  Feeling tired or having little energy 3   5.  Poor appetite or overeating 3   6.  Feeling bad about yourself 3   7.  Trouble concentrating 3   8.  Moving slowly or restless 3   9.  Suicidal or self-harm thoughts 0   PHQ-9 Total Score 21   Difficulty at work, home, or with people Very difficult   NETTIE-7   [employer], 2002; Used with Permission) 4/25/2019   1. Feeling nervous, anxious, or on edge 3   2. Not being able to stop or control worrying 3   3. Worrying too much about different things 3   4. Trouble relaxing 3   5. Being so restless that it is hard to sit still 0   6. Becoming easily annoyed or irritable 0   7. Feeling afraid, as if something awful might happen 3   NETTIE-7 Total Score 15   If you checked any problems, how difficult have they made it for you to do your work, take care of things at home, or get along with other people? Very difficult     Additional history: as documented    Reviewed  and updated as needed this visit by clinical staff  Tobacco  Allergies  Meds  Problems  Med Hx  Surg Hx  Fam Hx  Soc Hx          Reviewed and updated  as needed this visit by Provider  Tobacco  Allergies  Meds  Problems  Med Hx  Surg Hx  Fam Hx  Soc Hx          Patient Active Problem List   Diagnosis     Hyperlipidemia     Chronic rhinitis     Morbid obesity (H)     Rosacea     Type 2 diabetes mellitus with diabetic nephropathy; goal HgbA1c < 7%     Essential hypertension; goal < 140/90     History of nephrolithiasis     AMERICA (obstructive sleep apnea)     Diabetic polyneuropathy associated with type 2 diabetes mellitus (H)     History of deep venous thrombosis right peroneal vein provoked by cellulitis     Chronic acquired lymphedema     Venous stasis dermatitis of both lower extremities     Past Surgical History:   Procedure Laterality Date     HC TOOTH EXTRACTION W/FORCEP      WISDOM TEETH     HERNIA REPAIR      mesh     TONSILLECTOMY      TONSILS       Social History     Tobacco Use     Smoking status: Never Smoker     Smokeless tobacco: Never Used   Substance Use Topics     Alcohol use: Yes     Alcohol/week: 0.0 oz     Comment: 1 drink monthly     Family History   Problem Relation Age of Onset     Cancer Mother         CA brain, lung, starte din tear duct,  age 52     Cardiovascular Father         CABG @ 74, periph. vas. disease     Alzheimer Disease Father      Chronic Obstructive Pulmonary Disease Father      Other - See Comments Father         GBS     Cardiovascular Brother         MI, chf dx 40,  mi age 50      Cancer - colorectal No family hx of          Current Outpatient Medications   Medication Sig Dispense Refill     aspirin 81 MG EC tablet Take 1 tablet (81 mg) by mouth daily 90 tablet 3     atorvastatin (LIPITOR) 40 MG tablet Take 1 tablet (40 mg) by mouth daily 90 tablet 1     Blood Glucose Monitoring Suppl (BLOOD GLUCOSE METER) KIT 1 Device daily 1 kit 2     cholecalciferol (VITAMIN D) 1000 UNIT tablet Take 1 tablet (1,000 Units) by mouth daily 100 tablet 3     Cyanocobalamin (B-12) 1000 MCG TBCR Take 1,000 mcg by mouth daily 100  tablet 1     escitalopram (LEXAPRO) 10 MG tablet 5 mg daily 1 week then increase to 10 mg daily 30 tablet 0     lisinopril (PRINIVIL/ZESTRIL) 10 MG tablet Take 1 tablet (10 mg) by mouth daily 90 tablet 1     loratadine (CLARITIN) 10 MG tablet Take 10 mg by mouth At Bedtime       magnesium gluconate (MAGONATE) 500 (27 Mg) MG tablet Take 500 mg by mouth At Bedtime       metFORMIN (GLUCOPHAGE-XR) 500 MG 24 hr tablet Take 2 tablets (1,000 mg) by mouth 2 times daily (with meals) 360 tablet 1     metroNIDAZOLE (METROGEL) 1 % external gel Apply topically daily 60 g 11     Multiple Vitamin (DAILY MULTIVITAMIN PO) Take 1 tablet by mouth At Bedtime        order for DME BBK 20-30 MM COMPRESSION SOCKS/ BBK FARROW CLASSIC VELCRO STRAP BINDERS WITH FOOT PIECES AND SOCK LINERS/ HYBRID SOCK 1 each 1     triamcinolone (KENALOG) 0.1 % external cream Apply topically 3 times daily 453.6 g 1     Turmeric (RA TURMERIC) 500 MG CAPS Take 1 capsule by mouth daily       Allergies   Allergen Reactions     Fluoxetine Hcl      Prozac: anxiety     Recent Labs   Lab Test 04/25/19  0923 03/04/19  1257 01/22/19  0805 01/21/19  0831  01/19/19  0923 01/18/19  1818 04/27/18  1027  03/03/17  1012  12/15/15  1334   A1C 7.5*  --   --  6.7*  --   --   --  6.6*  --  7.5*   < > 7.1*   LDL  --  99  --   --   --   --   --  77  --  87  --  98   HDL  --   --   --   --   --   --   --  37*  --  44  --  44   TRIG  --   --   --   --   --   --   --  305*  --  294*  --  288*   ALT 43  --   --   --   --   --  44 48  --  47   < > 78*   CR 0.92  --  0.95 0.90   < > 0.95 0.90 0.89   < > 0.91   < > 0.80   GFRESTIMATED 90  --  87 >90   < > 87 >90 88   < > 85   < > >90  Non  GFR Calc     GFRESTBLACK >90  --  >90 >90   < > >90 >90 >90   < > >90  African American GFR Calc     < > >90   GFR Calc     POTASSIUM 5.1  --   --   --   --  4.1 3.8 4.3   < > 5.1   < > 4.7   TSH 1.08  --   --   --   --   --   --   --   --  1.29  --   --     < > = values  "in this interval not displayed.      BP Readings from Last 3 Encounters:   04/25/19 135/69   03/04/19 131/73   02/13/19 133/79    Wt Readings from Last 3 Encounters:   04/25/19 145.7 kg (321 lb 4 oz)   03/04/19 147.6 kg (325 lb 6.4 oz)   02/13/19 145.1 kg (319 lb 12.8 oz)                    ROS:  Constitutional, HEENT, cardiovascular, pulmonary, GI, , musculoskeletal, neuro, skin, endocrine and psych systems are negative, except as otherwise noted.    OBJECTIVE:     /69 (BP Location: Left arm, Patient Position: Chair, Cuff Size: Adult Large)   Pulse 75   Temp 99.2  F (37.3  C) (Tympanic)   Resp 17   Ht 1.854 m (6' 1\")   Wt 145.7 kg (321 lb 4 oz)   SpO2 93%   BMI 42.38 kg/m    Body mass index is 42.38 kg/m .  GENERAL: alert, no distress, obese, fatigued and appears older than stated age  EYES: Eyes grossly normal to inspection, PERRL and conjunctivae and sclerae normal  HENT: ear canals and TM's normal, nose and mouth without ulcers or lesions  NECK: no adenopathy, no asymmetry, masses, or scars and thyroid normal to palpation  RESP: lungs clear to auscultation - no rales, rhonchi or wheezes  CV: regular rate and rhythm, normal S1 S2, no S3 or S4, no murmur, click or rub, no peripheral edema and peripheral pulses strong  ABDOMEN: soft, non tender, no hepatosplenomegaly, no masses and bowel sounds normal  MS: extremities normal- no gross deformities noted. Has B/l venous stasis with hyperpigmentation. No active dermatitis or cellulitis noted. No significant edema currently. Compression socks on so detailed exam of feet couldn't be done  SKIN: no suspicious lesions or rashes and has post inflammation hyperpigmentation & venous stasis stigmata on B/l shins  NEURO: Normal strength and tone, mentation intact and speech normal  PSYCH: mentation appears normal, concentration poor, affect flat, tearful, fatigued, judgement and insight intact and appearance well groomed    Diagnostic Test Results:  No results " found for this or any previous visit (from the past 24 hour(s)).    ASSESSMENT/PLAN:     1. Severe episode of recurrent major depressive disorder, without psychotic features (H)  Discussed the pathophysiology of anxiety/depression episodes and the various symptoms seen associated with anxiety episodes. Discussed possible triggers including fatigue, depression, stress, and chemicals such as alcohol, caffeine and certain drugs. Discussed the treatment including an aerobic exercise program, adequate rest, and both rescue meds and maintenance meds. We discussed the treatment for anxiety and depression in detail.  The importance of a multi faceted approach in controlling symptoms was reviewed.  The benefits of cognitive behavioral therapy reviewed, benefits of exercise, and stress reduction also discussed.      Duration of treatment is at least 9 months with medication. It may take 3-4 weeks before symptom improvement happens.  Do not stop medication suddenly, medication will need to be tapered off.  Side effects include decreased sex drive, drowsiness, weight gain, insomnia, anxiety, dizziness, headache, and nausea.  Some of these get better after the first 2-4 weeks.  We may have to try several different medications before we find the one that's right for you.  Some patients experience worsening of mood and can even have suicidal thoughts when they start these medications.  If that is the case go to the ER.  Touch base with me in 2 weeks in office visit. Slight increased risk of suicide with SSRI group of medications discussed    Restart Lexapro 5 mg daily then 10 mg after 1 week. Do counseling. Referral to psyche made. Go to the Er if worse. Stay active as exercise helps. Labs today . Continue CBT - cognitive behavioral therapy. Vitamin D 2000 IU daily , also recommend a multivitamin, and fish oil daily. Avoid Caffeine and alcohol. Valerian root extract for relaxation and sleep OR Melatonin at bedtime.. Books by [place]  "doctor [place]. Check out his website. Https://[email or web address].[employer]/. \"The Chemistry of Calm\" by Jose Alejandro Finn . Also \"Chemistry of Blank\" book and Work book by same author. \"Hope and Help for your Nerves\" by Abi Taylor. If experiencing a mental health crisis call the crisis response provider in his community: [place]ank: Adult [phone] children [phone], Eliezer: Adult [phone] children [phone]. In a life threatening emergency , call 911. Otherwise if needs help for urgent mental health please go to the Behavioral emergency Center at [facility]     Reminded to make an eye apt check sugars & Feet check daily. To consider Tdap and shingles vaccines. Use compression socks and elevate legs, see OT for lymphedema treatment as needed. Due to venous stasis legs may look red when dependant and better on elevation Continue with sleep specialists & their mx of his use of CPAP. Due for a colonoscopy or to do yearly stool test for colon cancer screen. Chronic minocycline / doxycycline use not recommended given risk of resistance, super gut infections and current mood disorder as known to worsen mood. Use Metrogel for rosacea instead and can see derm for other options if feels that is ineffective.   - TSH with free T4 reflex  - Escitalopram (LEXAPRO) 10 MG tablet; 5 mg daily 1 week then increase to 10 mg daily  Dispense: 30 tablet; Refill: 0  - MENTAL HEALTH REFERRAL  - Adult; Psychiatry and Medication Management; Psychiatry; Other: [facility] [phone]; We will contact you to schedule the appointment or please call with any questions  - MENTAL HEALTH REFERRAL  - Adult; Outpatient Treatment; Day Treatment/Dual Disorder/Partial Hospitalization Program; [facility]: 55+ Day Treatment (Provider Order Required to Assess & Treat) [phone]; We will contact you to schedule the appointment O...  - DEPRESSION ACTION PLAN (DAP)    2. NETTIE (generalized anxiety " disorder)  - TSH with free T4 reflex  - Escitalopram (LEXAPRO) 10 MG tablet; 5 mg daily 1 week then increase to 10 mg daily  Dispense: 30 tablet; Refill: 0  - MENTAL HEALTH REFERRAL  - Adult; Psychiatry and Medication Management; Psychiatry; Other: [facility] [phone]; We will contact you to schedule the appointment or please call with any questions  - MENTAL HEALTH REFERRAL  - Adult; Outpatient Treatment; Day Treatment/Dual Disorder/Partial Hospitalization Program; [facility]: 55+ Day Treatment (Provider Order Required to Assess & Treat) [phone]; We will contact you to schedule the appointment O...  - DEPRESSION ACTION PLAN (DAP)    3. Type 2 diabetes mellitus with diabetic nephropathy, without long-term current use of insulin (H)  - Albumin Random Urine Quantitative with Creat Ratio  - CBC with platelets differential  - Comprehensive metabolic panel  - C FOOT EXAM  NO CHARGE  - Hemoglobin A1c    4. Diabetic polyneuropathy associated with type 2 diabetes mellitus (H)  - Albumin Random Urine Quantitative with Creat Ratio  - CBC with platelets differential  - Comprehensive metabolic panel  - C FOOT EXAM  NO CHARGE  - Hemoglobin A1c    5. Morbid obesity (H)  Exercise & lifestyle changes will help.   - CBC with platelets differential  - Comprehensive metabolic panel    6. Essential hypertension; goal < 140/90  Controlled on lisinopril.   - Albumin Random Urine Quantitative with Creat Ratio  - CBC with platelets differential  - Comprehensive metabolic panel    7. AMERICA (obstructive sleep apnea)  Continue use of CPAP under care of sleep.   - CBC with platelets differential  - Comprehensive metabolic panel    8. Hyperlipidemia, unspecified hyperlipidemia type  Continue statin use.   - CBC with platelets differential  - Comprehensive metabolic panel    9. Chronic acquired lymphedema  Elevate, use compression socks, check feet daily.back to OT if gets worse  - CBC with platelets differential  -  Comprehensive metabolic panel  - C FOOT EXAM  NO CHARGE    10. Venous stasis dermatitis of both lower extremities  Currently not active. Use kenalog prn. Treat as in #9.  - CBC with platelets differential  - Comprehensive metabolic panel  - DERMATOLOGY REFERRAL    11. History of deep venous thrombosis right peroneal vein provoked by cellulitis  Resolved no longer on chronic anticoagulation since 2016.   - CBC with platelets differential  - Comprehensive metabolic panel    12. Rosacea  Chronic minocycline / doxycycline use not recommended given risk of resistance, super gut infections and current mood disorder as known to worsen mood. Use Metrogel for rosacea instead and can see derm for other options if feels that is ineffective.   - CBC with platelets differential  - Comprehensive metabolic panel  - DERMATOLOGY REFERRAL    13. History of nephrolithiasis  Asymptomatic. Stay well hydrated.   - CBC with platelets differential  - Comprehensive metabolic panel    14. Special screening for malignant neoplasms, colon  Reports has FIT at home & will do that instead of a colonoscopy.     15. Need for diphtheria-tetanus-pertussis (Tdap) vaccine  Declines today.     16. Need for shingles vaccine  Declined today.     Work on weight loss  Regular exercise  Total time spent was 40 minutes, and more than 50% of face to face time was spent in counseling and/or coordination of care regarding principles of care, medication management, and appropriate interventional options.      See Patient Instructions    Layla Murillo MD  [facility]

## 2019-04-24 PROBLEM — A49.1 GROUP B STREPTOCOCCAL INFECTION: Status: RESOLVED | Noted: 2019-01-20 | Resolved: 2019-04-24

## 2019-04-24 PROBLEM — R89.5 POSITIVE CULTURE FINDING: Status: RESOLVED | Noted: 2019-01-19 | Resolved: 2019-04-24

## 2019-04-24 PROBLEM — L03.116 CELLULITIS OF LEFT LOWER EXTREMITY: Status: RESOLVED | Noted: 2019-01-18 | Resolved: 2019-04-24

## 2019-04-24 PROBLEM — A49.01 STAPH AUREUS INFECTION: Status: RESOLVED | Noted: 2019-01-20 | Resolved: 2019-04-24

## 2019-04-24 PROBLEM — L03.90 CELLULITIS: Status: RESOLVED | Noted: 2019-01-18 | Resolved: 2019-04-24

## 2019-04-25 ENCOUNTER — OFFICE VISIT (OUTPATIENT)
Dept: FAMILY MEDICINE | Facility: CLINIC | Age: 59
End: 2019-04-25
Payer: COMMERCIAL

## 2019-04-25 VITALS
RESPIRATION RATE: 17 BRPM | HEART RATE: 75 BPM | SYSTOLIC BLOOD PRESSURE: 135 MMHG | DIASTOLIC BLOOD PRESSURE: 69 MMHG | TEMPERATURE: 99.2 F | HEIGHT: 73 IN | OXYGEN SATURATION: 93 % | WEIGHT: 315 LBS | BODY MASS INDEX: 41.75 KG/M2

## 2019-04-25 DIAGNOSIS — Z87.442 HISTORY OF NEPHROLITHIASIS: Chronic | ICD-10-CM

## 2019-04-25 DIAGNOSIS — I10 ESSENTIAL HYPERTENSION: Chronic | ICD-10-CM

## 2019-04-25 DIAGNOSIS — Z86.718 HISTORY OF DEEP VENOUS THROMBOSIS: Chronic | ICD-10-CM

## 2019-04-25 DIAGNOSIS — L71.9 ROSACEA: Chronic | ICD-10-CM

## 2019-04-25 DIAGNOSIS — G47.33 OSA (OBSTRUCTIVE SLEEP APNEA): Chronic | ICD-10-CM

## 2019-04-25 DIAGNOSIS — F33.2 SEVERE EPISODE OF RECURRENT MAJOR DEPRESSIVE DISORDER, WITHOUT PSYCHOTIC FEATURES (H): Primary | ICD-10-CM

## 2019-04-25 DIAGNOSIS — I87.2 VENOUS STASIS DERMATITIS OF BOTH LOWER EXTREMITIES: ICD-10-CM

## 2019-04-25 DIAGNOSIS — Z12.11 SPECIAL SCREENING FOR MALIGNANT NEOPLASMS, COLON: ICD-10-CM

## 2019-04-25 DIAGNOSIS — E11.42 DIABETIC POLYNEUROPATHY ASSOCIATED WITH TYPE 2 DIABETES MELLITUS (H): Chronic | ICD-10-CM

## 2019-04-25 DIAGNOSIS — F41.1 GAD (GENERALIZED ANXIETY DISORDER): ICD-10-CM

## 2019-04-25 DIAGNOSIS — I89.0 CHRONIC ACQUIRED LYMPHEDEMA: Chronic | ICD-10-CM

## 2019-04-25 DIAGNOSIS — E66.01 MORBID OBESITY (H): Chronic | ICD-10-CM

## 2019-04-25 DIAGNOSIS — E11.21 TYPE 2 DIABETES MELLITUS WITH DIABETIC NEPHROPATHY, WITHOUT LONG-TERM CURRENT USE OF INSULIN (H): Chronic | ICD-10-CM

## 2019-04-25 DIAGNOSIS — E78.5 HYPERLIPIDEMIA, UNSPECIFIED HYPERLIPIDEMIA TYPE: Chronic | ICD-10-CM

## 2019-04-25 DIAGNOSIS — Z23 NEED FOR DIPHTHERIA-TETANUS-PERTUSSIS (TDAP) VACCINE: ICD-10-CM

## 2019-04-25 DIAGNOSIS — Z23 NEED FOR SHINGLES VACCINE: ICD-10-CM

## 2019-04-25 LAB
ALBUMIN SERPL-MCNC: 4 G/DL (ref 3.4–5)
ALP SERPL-CCNC: 99 U/L (ref 40–150)
ALT SERPL W P-5'-P-CCNC: 43 U/L (ref 0–70)
ANION GAP SERPL CALCULATED.3IONS-SCNC: 6 MMOL/L (ref 3–14)
AST SERPL W P-5'-P-CCNC: 23 U/L (ref 0–45)
BASOPHILS # BLD AUTO: 0 10E9/L (ref 0–0.2)
BASOPHILS NFR BLD AUTO: 0.3 %
BILIRUB SERPL-MCNC: 0.7 MG/DL (ref 0.2–1.3)
BUN SERPL-MCNC: 20 MG/DL (ref 7–30)
CALCIUM SERPL-MCNC: 9.3 MG/DL (ref 8.5–10.1)
CHLORIDE SERPL-SCNC: 107 MMOL/L (ref 94–109)
CO2 SERPL-SCNC: 25 MMOL/L (ref 20–32)
CREAT SERPL-MCNC: 0.92 MG/DL (ref 0.66–1.25)
CREAT UR-MCNC: 126 MG/DL
DIFFERENTIAL METHOD BLD: NORMAL
EOSINOPHIL # BLD AUTO: 0.1 10E9/L (ref 0–0.7)
EOSINOPHIL NFR BLD AUTO: 1.9 %
ERYTHROCYTE [DISTWIDTH] IN BLOOD BY AUTOMATED COUNT: 13.8 % (ref 10–15)
GFR SERPL CREATININE-BSD FRML MDRD: 90 ML/MIN/{1.73_M2}
GLUCOSE SERPL-MCNC: 174 MG/DL (ref 70–99)
HBA1C MFR BLD: 7.5 % (ref 0–5.6)
HCT VFR BLD AUTO: 42.2 % (ref 40–53)
HGB BLD-MCNC: 14 G/DL (ref 13.3–17.7)
LYMPHOCYTES # BLD AUTO: 1.5 10E9/L (ref 0.8–5.3)
LYMPHOCYTES NFR BLD AUTO: 21.6 %
MCH RBC QN AUTO: 30.7 PG (ref 26.5–33)
MCHC RBC AUTO-ENTMCNC: 33.2 G/DL (ref 31.5–36.5)
MCV RBC AUTO: 93 FL (ref 78–100)
MICROALBUMIN UR-MCNC: 228 MG/L
MICROALBUMIN/CREAT UR: 180.95 MG/G CR (ref 0–17)
MONOCYTES # BLD AUTO: 0.7 10E9/L (ref 0–1.3)
MONOCYTES NFR BLD AUTO: 9.4 %
NEUTROPHILS # BLD AUTO: 4.6 10E9/L (ref 1.6–8.3)
NEUTROPHILS NFR BLD AUTO: 66.8 %
PLATELET # BLD AUTO: 195 10E9/L (ref 150–450)
POTASSIUM SERPL-SCNC: 5.1 MMOL/L (ref 3.4–5.3)
PROT SERPL-MCNC: 7.2 G/DL (ref 6.8–8.8)
RBC # BLD AUTO: 4.56 10E12/L (ref 4.4–5.9)
SODIUM SERPL-SCNC: 138 MMOL/L (ref 133–144)
TSH SERPL DL<=0.005 MIU/L-ACNC: 1.08 MU/L (ref 0.4–4)
WBC # BLD AUTO: 6.9 10E9/L (ref 4–11)

## 2019-04-25 PROCEDURE — 83036 HEMOGLOBIN GLYCOSYLATED A1C: CPT | Performed by: FAMILY MEDICINE

## 2019-04-25 PROCEDURE — 84443 ASSAY THYROID STIM HORMONE: CPT | Performed by: FAMILY MEDICINE

## 2019-04-25 PROCEDURE — 85025 COMPLETE CBC W/AUTO DIFF WBC: CPT | Performed by: FAMILY MEDICINE

## 2019-04-25 PROCEDURE — 99207 C FOOT EXAM  NO CHARGE: CPT | Performed by: FAMILY MEDICINE

## 2019-04-25 PROCEDURE — 99215 OFFICE O/P EST HI 40 MIN: CPT | Performed by: FAMILY MEDICINE

## 2019-04-25 PROCEDURE — 80053 COMPREHEN METABOLIC PANEL: CPT | Performed by: FAMILY MEDICINE

## 2019-04-25 PROCEDURE — 82043 UR ALBUMIN QUANTITATIVE: CPT | Performed by: FAMILY MEDICINE

## 2019-04-25 PROCEDURE — 36415 COLL VENOUS BLD VENIPUNCTURE: CPT | Performed by: FAMILY MEDICINE

## 2019-04-25 RX ORDER — ESCITALOPRAM OXALATE 10 MG/1
TABLET ORAL
Qty: 30 TABLET | Refills: 0 | Status: SHIPPED | OUTPATIENT
Start: 2019-04-25 | End: 2019-05-09

## 2019-04-25 ASSESSMENT — ANXIETY QUESTIONNAIRES
7. FEELING AFRAID AS IF SOMETHING AWFUL MIGHT HAPPEN: NEARLY EVERY DAY
1. FEELING NERVOUS, ANXIOUS, OR ON EDGE: NEARLY EVERY DAY
5. BEING SO RESTLESS THAT IT IS HARD TO SIT STILL: NOT AT ALL
2. NOT BEING ABLE TO STOP OR CONTROL WORRYING: NEARLY EVERY DAY
6. BECOMING EASILY ANNOYED OR IRRITABLE: NOT AT ALL
GAD7 TOTAL SCORE: 15
3. WORRYING TOO MUCH ABOUT DIFFERENT THINGS: NEARLY EVERY DAY
IF YOU CHECKED OFF ANY PROBLEMS ON THIS QUESTIONNAIRE, HOW DIFFICULT HAVE THESE PROBLEMS MADE IT FOR YOU TO DO YOUR WORK, TAKE CARE OF THINGS AT HOME, OR GET ALONG WITH OTHER PEOPLE: VERY DIFFICULT

## 2019-04-25 ASSESSMENT — MIFFLIN-ST. JEOR: SCORE: 2321.06

## 2019-04-25 ASSESSMENT — PATIENT HEALTH QUESTIONNAIRE - PHQ9
5. POOR APPETITE OR OVEREATING: NEARLY EVERY DAY
SUM OF ALL RESPONSES TO PHQ QUESTIONS 1-9: 21

## 2019-04-25 NOTE — RESULT ENCOUNTER NOTE
Ninijeronimo Mr. Vazquez,  Your results came back and are within acceptable limits. -Liver and gallbladder tests (ALT,AST, Alk phos,bilirubin) are normal.  -Kidney function (GFR) is normal.  -Sodium is normal.  -Potassium is normal.  -Calcium is normal.  -Glucose is elevated due to your diabetes.  -TSH (thyroid stimulating hormone) level is normal which indicates normal thyroid function.. If you have any further concerns please do not hesitate to contact us by message, phone or making an appointment.  Have a good day   Dr Chidi MCKEE

## 2019-04-25 NOTE — PATIENT INSTRUCTIONS
Restart lexapro 5 mg daily then 10 mg after 1 week   Do counseling  Referral for psyche made   Go to the Er if worse  Stay active as exercise helps  Labs today   Make an eye apt  Foot check daily  Consider Tdap and shingles vaccines  Use compression socks and elevate legs, see OT for lymphedema treatment as needed  Due to venous stasis legs may look red when dependant and better on elevation  continue with sleep and use of CPAP  Due for a colonoscopy or do yearly stool test for colon cancer screen  Chronic minocycline / doxycycline use not recommended given risk of resistance, super gut infections an dmood disorder  Sure metrogel for rosacea instead and can see derm fr other options    Discussed the pathophysiology of anxiety/depression episodes and the various symptoms seen associated with anxiety episodes. Discussed possible triggers including fatigue, depression, stress, and chemicals such as alcohol, caffeine and certain drugs. Discussed the treatment including an aerobic exercise program, adequate rest, and both rescue meds and maintenance meds. We discussed the treatment for anxiety and depression in detail.  The importance of a multi faceted approach in controlling symptoms was reviewed.  The benefits of cognitive behavioral therapy reviewed, benefits of exercise, and stress reduction also discussed.      Duration of treatment is at least 9 months with medication. It may take 3-4 weeks before symptom improvement happens.  Do not stop medication suddenly, medication will need to be tapered off.  Side effects include decreased sex drive, drowsiness, weight gain, insomnia, anxiety, dizziness, headache, and nausea.  Some of these get better after the first 2-4 weeks.  We may have to try several different medications before we find the one that's right for you.  Some patients experience worsening of mood and can even have suicidal thoughts when they start these medications.  If that is the case go to the ER.   "Touch base with me in 2 weeks in office visit. Slight increased risk of suicide with SSRI group of medications discussed    Continue CBT - cognitive behavioral therapy   Vitamin D 2000 IU daily , also recommend a multivitamin, and fish oil daily  Avoid Caffeine and alcohol   Valerian root extract for relaxation and sleep OR Melatonin at bedtime.  Books by [place] doctor [place]. Check out his website. https://www.[employer]/  \"The Chemistry of Calm\" by Jose Alejandro Finn . Also \"Chemistry of Blank\" book and Work book by same author  \"Hope and Help for your Nerves\" by Abi Taylor   If you are experiencing a mental health crisis call the crisis response provider in your community :  Dank: Adult [phone] children [phone]  Eliezer: Adult [phone] children [phone]  In a life threatening emergency , call 911     Otherwise if need help for urgent mental health please go to the [facility] at [facility]           "

## 2019-04-25 NOTE — RESULT ENCOUNTER NOTE
Rosey Mr. Vazquez,  Some of your results came back as follows   -Normal red blood cell (hgb) levels, normal white blood cell count and normal platelet levels.  -A1C test (average blood sugar the last 2-3 months) is above your goal.   ADVISE: Please check and record your blood sugars at least daily for 1 week prior to your appointment and bring for review.  Also, you should recheck your A1C in 3 months.. If you have any further concerns please do not hesitate to contact us by message, phone or making an appointment.  Have a good day   Dr Chidi MCKEE

## 2019-04-25 NOTE — RESULT ENCOUNTER NOTE
Rosey Mr. Vazquez,  Your results came back    -Microalbumin (urine protein) level is elevated. This is suggestive of early damage to your kidneys from high blood pressure and diabetes.  ADVISE: avoiding anti-inflamatory agents such as ibuprofen (Advil, Motrin) or naproxen (Aleve) as much as possible, keeping your blood pressure in a normal range, and continuing your medication (lisinopril) that helps protect your kidneys.  Also, this should be rechecked in 3 months. If you have any further concerns please do not hesitate to contact us by message, phone or making an appointment.  Have a good day   Dr Chidi MCKEE

## 2019-04-26 ENCOUNTER — TELEPHONE (OUTPATIENT)
Dept: FAMILY MEDICINE | Facility: CLINIC | Age: 59
End: 2019-04-26

## 2019-04-26 ASSESSMENT — ANXIETY QUESTIONNAIRES: GAD7 TOTAL SCORE: 15

## 2019-04-26 NOTE — TELEPHONE ENCOUNTER
----- Message from Lizy Gutierrez sent at 4/26/2019 10:05 AM CDT -----  Regarding: mental health order  Patient was contacted by [facility]. Patient was scheduled for medication management services with Anyi Lester on 5/30/19 at 10am.      Lizy Gutierrez,  , [facility]

## 2019-04-27 ENCOUNTER — MYC MEDICAL ADVICE (OUTPATIENT)
Dept: FAMILY MEDICINE | Facility: CLINIC | Age: 59
End: 2019-04-27

## 2019-04-28 PROBLEM — I87.2 VENOUS STASIS DERMATITIS OF BOTH LOWER EXTREMITIES: Status: ACTIVE | Noted: 2019-04-28

## 2019-05-03 ENCOUNTER — HOSPITAL ENCOUNTER (OUTPATIENT)
Dept: BEHAVIORAL HEALTH | Facility: CLINIC | Age: 59
Discharge: HOME | End: 2019-05-03
Attending: PSYCHIATRY & NEUROLOGY | Admitting: PSYCHIATRY & NEUROLOGY
Payer: COMMERCIAL

## 2019-05-03 PROCEDURE — 90791 PSYCH DIAGNOSTIC EVALUATION: CPT | Performed by: SOCIAL WORKER

## 2019-05-03 ASSESSMENT — PAIN SCALES - GENERAL: PAINLEVEL: NO PAIN (0)

## 2019-05-03 NOTE — PROGRESS NOTES
" Standard Diagnostic Assessment     CLIENT'S NAME: Peterson Vazquez  MRN:   [id number]  :   1960 AGE:58 year old SEX: male  ACCT. NUMBER: [id number]  DATE OF SERVICE: 19 Start Time: 0910AM End Time:  10:20AM    Home Phone [phone]   Work Phone [phone]   Mobile [phone]     Preferred Phone: work phone  May we leave a program related message? yes    Yes, the patient has been informed that any other mental health professional providing mental health services to me will need access to this Diagnostic Assessment in order to develop a treatment plan and receive payment.     Identifying Information:  Peterson Vazquez is a 58 year old, White,  male. Peterson attended the   alone.     Reason for Referral: Peterson was referred to 55+ Outpatient Program (55+) by provider at [facility], Dr Murillo. . Peterson reports the reason for referral at this time is \" having marital issues and increasingly depressed and felt he could benefit from this level of care\".     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 Feels his work has suffered for quite a while and feels he has been depressed for a couple years. Relationships-  for 4 years but together for 12. The stress is that they struggled to communicate about their issues together.     Per Client, Review of Symptoms:  Mood (Depression/Anxiety/Neda/Anger): sad, depressed, hopeless, worthless, fatigue, low self confidence. Fear of leaving the house.    Thoughts: It would be better to not be alive, racing and sluggish thoughts, constant worry.   Concentration/Memory: difficulty concentrating, trouble remembering things.   Appetite/Weight: (see also, Physical Health Screening below) no or low appetite, limited or " avoiding eating.   Sleep: sleeping too much, difficulty falling asleep, sleep does not feel restful.    Motivation/Energy: low motivation/energy.  Behavior: crying easily and often, repetitive actions such as counting.     Psychosis:     Trauma: +   Other:     Mental Health History:  Peterson reports first onset of mental health symptoms: For the last couple years have been depressed with worsening in the last month with increasing isolation and loss of self worth and lethargy. Also dealt with depression about 15 years ago after first marriage ended in 2006- had been seeing a psychiatrist after first marriage ended.   Peterson was first diagnosed: Major Depression recently when seeing a therapist, Dr Najera since mid April.   Peterson received the following mental health services in the past: counseling and psychiatry.   Psychiatric Hospitalizations: None.   Peterson denies a history of civil commitment.     Onset/Duration/Pattern of Symptoms noted above:  The symptoms have worsened in the last 2 weeks the trigger has been his wife confronting him about their relationship and telling him she may want to leave the relationship. Having more difficulty with functioning and self care, excessive sleep, and sleep does not feel restful,  passive suicidal ideation, passive aggressive self destructiveness, sad, worry, withdrawing, loss of interest.     Peterson reports the following understanding of his diagnosis: Major Depression.      Personal Safety:    Seward-Suicide Severity Rating Scale   Suicide Ideation   1.) Have you ever wished you were dead or that you could go to sleep and not wake up?     Lifetime: Yes, (if yes, please discribe) : life just doesn't have any meaning and feels like he is marking time waiting to die. Past Month:  Yes, (if yes, please discribe) : see previous.   2.) Have you actually had any thoughts of killing yourself?   Lifetime:  No Past Month:  Yes, (if yes, please discribe) :    3.) Have you been  thinking about how you might do this?     Lifetime:  No Past Month:  No   4.) Have you had these thoughts and had some intention of acting on them?     Lifetime:  No Past Month:  No   5.) Have you started to work out the details of how to kill yourself?   Lifetime:  No Past Month:  No   6.) Do you intend to carry out this plan?      Lifetime:  No Past Month:  No   Intensity of Ideation   Intensity of ideation (1 being least severe, 5 being most severe):     Lifetime:  2, description of Ideation: passive thoughts of not wanting to be alive.                                                                                                Past Month:  2, description of Ideation: same/   How often do you have these thoughts? Less than once a week    When you have the thoughts how long do they last?  Fleeting - few seconds or minutes   Can you stop thinking about killing yourself or wanting to die if you want to?  Can control thoughts with little difficulty   Are there things - anyone or anything (i.e. family, Baptist, pain of death) that stopped you from wanting to die or acting on thoughts of suicide?  Uncertain that protective factors stopped you      What sort of reasons did you have for thinking about wanting to die or killing yourself (ie end pain, stop how you were feeling, get attention or reaction, revenge)?  Mostly to end or stop the pain (you couldn't go on living with the pain or how you were feeling)   Suicidal Behavior   (Suicide Attempt) - Have you made a suicide attempt?     Lifetime:  No Past Month: No   Have you engaged in self-harm (non-suicidal self-injury)?  No   (Interrupted Attempt) - Has there been a time when you started to do something to end your life but someone or something stopped you before you actually did anything?  No   (Aborted or Self-Interrupted Attempt) - Has there been a time when you started to do something to try to end your life but you stopped yourself before you actually did  anything?  No   (Preparatory Acts of Behavior) - Have you taken any steps towards making suicide attempt or preparing to kill yourself (such as collecting pills, getting a gun, giving valuables away or writing a suicide note)? No   Actual Lethality/Medical Damage:   0. No physical damage or very minor physical damage (e.g., surface scratches).   1. Minor physical damage (e.g., lethargic speech; first-degree burns; mild bleeding; sprains).  2. Moderate physical damage; medical attention needed (e.g., conscious but sleepy, somewhat responsive; second-degree burns; bleeding of major vessel).  3. Moderately severe physical damage; medical hospitalization and likely intensive care required (e.g., comatose with reflexes intact; third-degree burns less than 20% of body; extensive blood loss but can recover; major fractures).  4. Sever physical damage; medical hospitalization with intensive care required (e.g., comatose without reflexes; third-degree burs over 20% of body; extensive blood loss with unstable vital sign; major damage to a vital area).  5. Death    Attempt Date / Enter Code:   Attempt Date / Enter Code:   Attempt Date / Enter Code: NA       2008  [facility] [place] [facility]  Used with permission by Stefania Ulrich, PhD.               Guide to C-SSRS Risk Ratings   NO IDEATION:  with no active thoughts IDEATION: with a wish to die. IDEATION: with active thoughts. Risk Ratings   If Yes No No 0 - Very Low Risk   If NA Yes No 1 - Low Risk   If NA Yes Yes 2 - Low/moderate risk   IDEATION: associated thoughts of methods without intent or plan INTENT: Intent to follow through on suicide PLAN: Plan to follow through on suicide Risk Ratings cont...   If Yes No No 3 - Moderate Risk   If Yes Yes No 4 - High Risk   If Yes Yes Yes 5 - High Risk   The patient's ADDITIONAL RISK FACTORS and lack of PROTECTIVE FACTORS may increase their overall suicide risk ratings.      Additional Risk Factors:    Tendency  to be socially isolated and/or cut off from the support of others   Protective Factors: Sense of responsibility to family, Reality testing ability and Positive therapeutic releationships       Risk Status   Risk Ratin very low risk DA Staff:SONAL Campos to Tx team.    Additional information to support suicide risk rating:  OR There was no additional information to provide at this time.  Please see the above suicide risk rating information.       Additional Safety Questions:    Do you have a gun, weapons or other means (including medications) to harm yourself available to you? No   Do you take chances with your safety?   no   Have you ever thought about killing someone else? No   Have you ever heard voices telling you to harm yourself or others? No       Supports:   From whom do you receive support and how often? (family/friends/agency) Wife- robbin       Do your support people want/need education/resources? yes maybe.        Is there anything in your life (current or history) that is satisfying to you (include leisure interests/hobbies)?   Yes- play in a [employer] band. Music collection and . Gardening and yard work.       Hope/Belief System:  Do you believe things can get better? yes       Personal Safety Summary:          Peterson denies current fears or concerns for personal safety.    Completed safety coping plan? No- client denied any active intent to harm self, has no past history of suicidal attempts, no prior history of inpatient admissions, reports fleeing passive thoughts with no planning or intent.        Substance Use History:       Substance: Hx of Use/Abuse: Last Use: Pattern of Use:   Alcohol no 2 weeks ago Very infrequently.   Cannabis no     Street Drugs no     Prescription Drugs no     Other        Substance Use Disorder Treatment: Peterson is currently receiving the following services: No indications of CD issues.       CAGE-AID:  Have you ever felt you ought to cut down on  "your drinking or drug use?   No    Have people annoyed you by criticizing your drinking or drug use?   No    Have you ever felt bad or guilty about your drinking or drug use?   No    Have you ever had a drink or used drugs first thing in the morning to steady your nerves or to get rid of a hangover?  No    Do you feel these issues have been adequately addressed? NA If no, are you ready to address them now? NA    Chemical Dependency Assessment Recommended?  No      Peterson has a negative Cage-Aid score.     Legal History:    Peterson reports that he has not been involved with the legal system.   ________________________________________________________________________    Life Situation (Employment/School/Finances/Basic Needs):  Peterson  is currently living with wife becky in a house in [place]".   The safety/stability of this environment is described as: safe.    Peterson is currently employed full time:   Peterson describes a work Hx of [employer] .  Peterson reports finances are obtained through Employment  Peterson does identify his finances as a current stressor.  Peterson denies a history of gambling and denies a history of gambling treatment.     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 Peterson did not identify any learning problems   Peterson describes academic performance as:\" great\".    Peterson describes school social experience as: \" non existent was  and live off campus\".      Peterson denies concerns regarding his current ability to meet basic needs.     Social/Family History:  Peterson  reports he grew up in [place].   Peterson was the second born of 2 children. Older brother now .   Peterson reports his biological parents are  until mothe  and father remarried.    Peterson describes his childhood as \" mostly unhappy, mother diagnosed of cancer when client was 4 and  at age 14\".   Peterson describes his current relationships " with his family of origin as: every one else is . Brother  in 2004 and father in 2017.     Peterson identifies his relationship status as: .    Peterson identifies his sexual orientation as: opposite sex   Peterson denies sexual health concerns.     Peterson reports having 0  children.     Peterson describes the quantity/quality of his social relationships as : minimal.       Significant Losses / Trauma / Abuse / Neglect Issues / Developmental Incidents:  Peterson reports significant loss/trauma/abuse/neglect issues/developmental incidents mother  of cancer when he was 14, he never had therapy. All he did was sleep after her death and flunking out of college. Now is last survivor in his family and has no kids.    Peterson reports death of mother after long malave with CA when he was age 14. Never dealt with it but flunked out of college and got depressed.  Peterson has not addressed the above concerns in previous therapy/treatment     Peterson denies personal  experience.     Quaker Preference/Spiritual Beliefs/Cultural Considerations: NA    A. Ethnic Self-Identification:  Peterson self-identifies his race/ethnicities as:  and his preferred language to be English.   Peterson reports he does not need the assistance of an . Peterson  reports he does not need other support or modifications involved in therapy.     B. Do you experience cultural bias (the practice of interpreting judging behavior by standards inherent to one's own culture) by other people as a stressor? If yes, describe how this relates to overall mental health symptoms.  No    C. Are there any cultural influences that may need to be considered for your treatment?  (This includes historical, geographical and familial factors that affect assessment and intervention processes). No, Denies any cultural influences or concerns that need to be considered for treatment    Strengths/Vulnerabilities:   Peterson identifies  his personal strengths as: employed, intelligent, motivated, open to learning, open to suggestions / feedback, wants to learn, willing to ask questions, willing to relate to others and work history    Things that may interfere with the clients success in treatment include: few friends, lack of family support and lack of social support.   Other identified areas of vulnerability include: Suicidal Ideation  Depressive symptoms  Trauma/Abuse/Neglect.     Medical History / Physical Health Screen:     Primary Care Physician: Peterson has a [facility], who is named Bridger Silva..   Last Physical Exam: within the past year. Client was encouraged to follow up with PCP if symptoms were to develop.    Mental Health Medication Management Provider / Psychiatrist: Peterson has a psychiatrist whose name and location are: first appoint last week in may. .     Last visit: NA        Next visit: [facility]- 5.29.19 Dr Up.    Current medications including prescription, non-prescription, herbals, dietary aids and vitamins:  Per client report:   Outpatient Medications Marked as Taking for the 5/3/19 encounter (Hospital Encounter) with Karla Coppola, [facility]   Medication Sig     aspirin 81 MG EC tablet Take 1 tablet (81 mg) by mouth daily     atorvastatin (LIPITOR) 40 MG tablet Take 1 tablet (40 mg) by mouth daily     Blood Glucose Monitoring Suppl (BLOOD GLUCOSE METER) KIT 1 Device daily     cholecalciferol (VITAMIN D) 1000 UNIT tablet Take 1 tablet (1,000 Units) by mouth daily     Cyanocobalamin (B-12) 1000 MCG TBCR Take 1,000 mcg by mouth daily     escitalopram (LEXAPRO) 10 MG tablet 5 mg daily 1 week then increase to 10 mg daily     lisinopril (PRINIVIL/ZESTRIL) 10 MG tablet Take 1 tablet (10 mg) by mouth daily     loratadine (CLARITIN) 10 MG tablet Take 10 mg by mouth At Bedtime     magnesium gluconate (MAGONATE) 500 (27 Mg) MG tablet Take 500 mg by mouth At Bedtime      metFORMIN (GLUCOPHAGE-XR) 500 MG 24 hr tablet Take 2 tablets (1,000 mg) by mouth 2 times daily (with meals)     metroNIDAZOLE (METROGEL) 1 % external gel Apply topically daily     Multiple Vitamin (DAILY MULTIVITAMIN PO) Take 1 tablet by mouth At Bedtime      order for DME BBK 20-30 MM COMPRESSION SOCKS/ BBK FARROW CLASSIC VELCRO STRAP BINDERS WITH FOOT PIECES AND SOCK LINERS/ HYBRID SOCK     triamcinolone (KENALOG) 0.1 % external cream Apply topically 3 times daily     Turmeric (RA TURMERIC) 500 MG CAPS Take 1 capsule by mouth daily       Peterson reports current medications are: Effective.   Peterson describes taking his medications as: Independent.  Peterson reports taking prescribed medications as prescribed.     Peterson provides the following current assessment of pain:  ; Pain Score: No Pain (0);  .     Peterson provides the following information regarding past significant medical conditions/diagnoses:      Medical:  Past Medical History:   Diagnosis Date     Cellulitis of left lower extremity 2019     Chronic acquired lymphedema 2019     Chronic rhinitis      Depressive disorder      DVT (deep venous thrombosis) (H)     R peroneal vein in 2016     History of depression      HTN (hypertension) 2013     Morbid obesity (H)      Other and unspecified hyperlipidemia      Type II or unspecified type diabetes mellitus without mention of complication, not stated as uncontrolled        Surgical:  Past Surgical History:   Procedure Laterality Date     HC TOOTH EXTRACTION W/FORCEP      WISDOM TEETH     HERNIA REPAIR      mesh     TONSILLECTOMY      TONSILS     Allergy:   Peterson reports   Allergies   Allergen Reactions     Fluoxetine Hcl      Prozac: anxiety        Family History of Medical, Mental Health and/or Substance Use problems:  Per client report:   Family History   Problem Relation Age of Onset     Cancer Mother         CA brain, lung, starte din tear duct,  age 52      Cardiovascular Father         CABG @ 74, periph. vas. disease     Alzheimer Disease Father      Chronic Obstructive Pulmonary Disease Father      Other - See Comments Father         GBS     Depression Father      Cardiovascular Brother         MI, chf dx 40,  mi age 50      Depression Other      Cancer - colorectal No family hx of        Peterson reports no current medical concerns.      General Health:   Have you had any exposure to any communicable disease in the past 2-3 weeks? no     Are you aware of safe sex practices? yes     Is there a possibility of pregnancy?  no       Nutrition:    Are you on a special diet? If yes, please explain:  no   Do you have any concerns regarding your nutritional status? If yes, please explain:  no   Have you had any appetite changes in the last 3 months?  Poor appetite     Have you had any weight loss or weight gain in the last 3 months?  No     Do you have a history of an eating disorder? no   Do you have a history of being in an eating disorder program? no   Do you have any dental concerns? no   NOTE: BMI to be calculated following program admission.    Fall Risk:   Have you had any falls in the past 3 months? Yes, fell down the hill in front yard during winter. Back was hurt.      Do you currently use any assistive devices for mobility?   no     NOTE: If client reports 3 or more falls in the past 3 months, the client will not be accepted into the program until further assessment is completed by the program nurse. Check if a nurse is available to assess at time of DA.    NOTE: If client reports 2 falls in the past 3 months and/or the client currently uses assistive devices for mobility, the  will send an in-basket to the program nurse to meet with the client within the first week of programming.    Head Injury/Trauma:   Do you have a history of head injury / trauma? no     Do you have any cognitive impairment? Maybe a little bit.       Per completion of the Medical  History / Physical Health Screen, is there a recommendation to see / follow up with a primary care physician/clinic or dentist?    No.      Clinical Findings     Mental Status Assessment/Clinical Observation:  Appearance:   awake, alert  Eye Contact:   good  Psychomotor Behavior: Normal  intact station, gait and muscle tone  Attitude:   Cooperative    Oriented to:   All    Speech   Rate / Production: Normal    Volume:  Normal   Mood:    Depressed     Affect:    Constricted      Thought Content:  Clear  no evidence of suicidal ideation or homicidal ideation, no evidence of psychotic thought, passive suicidal ideation present, no auditory hallucinations present and no visual hallucinations present  Thought Form:  logical, linear and goal oriented no loose associations  Insight:    fair    Judgment:     intact  Attention Span/Concentration: intact  Recent and Remote Memory:  intact      Psychiatric Diagnosis:    296.32 (F33.1) Major Depressive Disorder, Recurrent Episode, Moderate _ and With anxious distress    Provisional Diagnostic Hypothesis (Explain R/O, other Provisional Diagnosis, and why alternative Diagnosis that were considered were ruled out):       Medical Concerns that may Impact Treatment:       Psychosocial and Contextual Factors (V-Codes):  V60.9 Unspecified housing or economic problem has financial stress and worried about marriage. and V61.10 Relationship distress with spouse or intimate partner partner may want a divorce- client has no family left living.    WHODAS 2.0 SCORE: 26/95 %    Client and family participation in assessment:   Peterson was alone during this assessment.   This assessment does not include collateral information.      Summary & Recommendations  Provide a brief summary of how diagnostic criteria is met (symptoms, duration & functional impairment), cause, prognosis, and likely consequences of symptoms. Include overview of pertinent client strengths, cultural influences, life  "situations, relationships, health concerns and how diagnosis interacts/impacts with client's life. Recommendations include: client preferences, prioritization of needed mental health, ancillary or other services and any referrals to services required by statute or rule.     Peterson \"Adrian \"George is a 58 year old male referred to the 55+ day treatment program by PCP at the [facility] as well as his therapist. Adrian reports worsening depression especially in the past month after his wife told him she is considering a separation. He has been increasingly distressed, with difficulty concentrating, feeling hopeless and helpless, fatigued, racing and sluggish thoughts, low appetite and some passive fleeting thoughts that life has no meaning and he is just waiting to die. He denied any active intent to harm himself.     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 As he is getting older he is finding it harder to keep up with the fast pace a details of his job but endorsed financial stress and needs to continue to work. This is Adrian's second marriage of 4 years, he does not have children. He did not elaborate about the nature of the marital stress but did state that due to his depression he is withdrawn and isolative. He could not identify any friends or support outside of his marriage. 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 He identified that he is feeling very isolated and alone and indeed he is the last remaining member of his biological family. When he was age 4 his mother was diagnosed with tear duct cancer and lost her eye and ended up dying of cancer when he was 14, just starting his senior year of HS. He reports he never really talked to anyone about his feelings or loss but when he entered college he could not concentrate and ended up dropping out and did get depressed. His father  in 2017 and his only " "brother in early 2000's.     He reports just starting on an anti-depressant and believes he is getting some benefits. He feels a bit more able to concentrate and less \" foggy\". He has just begun individual therapy and is willing to wait for placement in 55+ ( no immediate openings). He reports he will continue to take the medication and do individual therapy but remains interested in starting when there is an opening due to severe isolation that is a major contributor to lack of meaning, passive suicidal ideation.    Prognosis is Good. Without the recommended intervention, the client is likely to experience the following consequences of their symptoms: He remains vulnerable to further mood deterioration that may result in need for a higher level of care.    Referrals to services required by statute or rule:   Report to child/adult protection services was NA.   Referral to another professional/service is not indicated at this time..    Program Recommendation: 55+ Outpatient Program (55+).  A    Assessment Completed by: Karla Coppola  "

## 2019-05-09 ENCOUNTER — OFFICE VISIT (OUTPATIENT)
Dept: FAMILY MEDICINE | Facility: CLINIC | Age: 59
End: 2019-05-09
Payer: COMMERCIAL

## 2019-05-09 VITALS
BODY MASS INDEX: 41.96 KG/M2 | DIASTOLIC BLOOD PRESSURE: 72 MMHG | TEMPERATURE: 99 F | WEIGHT: 315 LBS | RESPIRATION RATE: 18 BRPM | SYSTOLIC BLOOD PRESSURE: 129 MMHG | HEART RATE: 91 BPM | OXYGEN SATURATION: 90 %

## 2019-05-09 DIAGNOSIS — Z23 NEED FOR SHINGLES VACCINE: ICD-10-CM

## 2019-05-09 DIAGNOSIS — F33.2 SEVERE EPISODE OF RECURRENT MAJOR DEPRESSIVE DISORDER, WITHOUT PSYCHOTIC FEATURES (H): Primary | ICD-10-CM

## 2019-05-09 DIAGNOSIS — L71.9 ROSACEA: Chronic | ICD-10-CM

## 2019-05-09 DIAGNOSIS — Z12.11 SPECIAL SCREENING FOR MALIGNANT NEOPLASMS, COLON: ICD-10-CM

## 2019-05-09 DIAGNOSIS — Z23 NEED FOR DIPHTHERIA-TETANUS-PERTUSSIS (TDAP) VACCINE: ICD-10-CM

## 2019-05-09 DIAGNOSIS — F41.1 GAD (GENERALIZED ANXIETY DISORDER): ICD-10-CM

## 2019-05-09 DIAGNOSIS — J06.9 VIRAL URI WITH COUGH: ICD-10-CM

## 2019-05-09 PROCEDURE — 99214 OFFICE O/P EST MOD 30 MIN: CPT | Performed by: FAMILY MEDICINE

## 2019-05-09 RX ORDER — ESCITALOPRAM OXALATE 10 MG/1
10 TABLET ORAL DAILY
Qty: 90 TABLET | Refills: 0 | Status: SHIPPED | OUTPATIENT
Start: 2019-05-09 | End: 2019-07-31

## 2019-05-09 ASSESSMENT — PATIENT HEALTH QUESTIONNAIRE - PHQ9
10. IF YOU CHECKED OFF ANY PROBLEMS, HOW DIFFICULT HAVE THESE PROBLEMS MADE IT FOR YOU TO DO YOUR WORK, TAKE CARE OF THINGS AT HOME, OR GET ALONG WITH OTHER PEOPLE: SOMEWHAT DIFFICULT
SUM OF ALL RESPONSES TO PHQ QUESTIONS 1-9: 7
SUM OF ALL RESPONSES TO PHQ QUESTIONS 1-9: 7

## 2019-05-09 ASSESSMENT — ANXIETY QUESTIONNAIRES
2. NOT BEING ABLE TO STOP OR CONTROL WORRYING: SEVERAL DAYS
7. FEELING AFRAID AS IF SOMETHING AWFUL MIGHT HAPPEN: SEVERAL DAYS
1. FEELING NERVOUS, ANXIOUS, OR ON EDGE: SEVERAL DAYS
4. TROUBLE RELAXING: NOT AT ALL
GAD7 TOTAL SCORE: 4
5. BEING SO RESTLESS THAT IT IS HARD TO SIT STILL: NOT AT ALL
GAD7 TOTAL SCORE: 4
6. BECOMING EASILY ANNOYED OR IRRITABLE: NOT AT ALL
3. WORRYING TOO MUCH ABOUT DIFFERENT THINGS: SEVERAL DAYS
GAD7 TOTAL SCORE: 4
7. FEELING AFRAID AS IF SOMETHING AWFUL MIGHT HAPPEN: SEVERAL DAYS

## 2019-05-09 NOTE — PATIENT INSTRUCTIONS
Continue therapy  Continue medication  Awaiting 55 + day treatment  Keep apt psyche  Anyi  See dermatology regarding rosacea options at [facility] as planned  Reminder to do stool test for colon cancer screen  Consider Tdap, shingles vaccine, one time HIv test   Find out cost of shingrex  See back in 1 month for physical and update preventive care  Will recheck micro albumin and diabetes at that point if also indicated and decide about lisinopril   Monitor feet closely  Compression socks daily  Monitor for fever, if has fever or shortness of breath come In to be seen   Currently no clinical pneumonia    flonase 1 spray each nostril daily 2 weeks  Zyrtec 10 mg daily 2 week  mucinex 600 mg twice a day 1 week  Humidifier in room may help  supportive care as below  Go to the Er if worse  Follow up with primary if symptoms persist     For symptom relief I suggest tryin. Steam.  Take a long, hot shower.  Or if you don't want to get in the shower just run it with the bathroom door shut for a few minutes and breathe the steam.  2. Drink hot liquids frequently such as tea or hot water with honey and lemon.  3. Acetaminophen (Tylenol) and ibuprofen (Motrin or Advil) as needed for headache, sore throat, body aches, or fever.  4. For loosening phlegm and sputum try guaifenesin (available in many combination products and alone as plain Robitussin or plain Mucinex) and for cough suppression you can try dextromethorphan (Delsym or combined in other products).  5. For nasal congestion try:    Nasal steroid spray such as nasacort or flonase, which are over-the-counter.  6. And most importantly: plenty of rest and sleep  especially while you have a fever.     Stop smoking and avoid secondhand smoke    Drink lots of fluids such as water and clear soups. Fluids help loosen mucus. Fluids are also important because they help prevent dehydration.    Gargle with warm salt water a few times a day to relieve a sore throat.  Throat sprays or lozenges may also help relieve the pain.    Avoid alcohol.    Use saline (salt water) nose drops to help loosen mucus and moisten the tender skin in your nose.  Encouraged mucinex, warm salt water gargles, cepacol spray, soothers/lozenges, sinus rinses (neilmed), flonase (2 sprays per nostril daily x 2 weeks), vitamin c, fluids and rest.  May alternate tylenol and NSAIDS (ibuprofen, advil, aleve type products) every 4-6 hours for the next few days as needed.   No need for oral antibiotic at this time.

## 2019-05-09 NOTE — PROGRESS NOTES
SUBJECTIVE:   Peterson Vazquez is a 58 year old male who presents to clinic today for the following health issues:    HPI     Depression and Anxiety Follow-Up    Status since last visit: Improved     Other associated symptoms:None    Complicating factors:     Significant life event: No     Current substance abuse: None    PHQ 4/25/2019 5/9/2019   PHQ-9 Total Score 21 7   Q9: Thoughts of better off dead/self-harm past 2 weeks Not at all Not at all     NETTIE-7 SCORE 4/25/2019 5/9/2019   Total Score - 4 (minimal anxiety)   Total Score 15 4     In the past two weeks have you had thoughts of suicide or self-harm?  No.    Do you have concerns about your personal safety or the safety of others?   No  PHQ-9  English  PHQ-9   Any Language  NETTIE-7  Suicide Assessment Five-step Evaluation and Treatment (SAFE-T)  Additional history: as documented    Hx of morbid obesity, DM on asa & metformin, DM nephropathy DM polyneuropathy, HLD on Lipitor, HTN on lisinopril, AMERICA on CPAP, chronic acquired lymphedema on compression socks, hx of prior DVT right peroneal proximal calf in 2016 on eliquis then 3 months as secondary to cellulitis at that time. Seen by hematology & recheck u/S after 3 months anticoagulation showed no DVT & advised no long term anticoagulation at that time.   Seen first by Dr Duffy in 1/2003 as a hospital follow up from [facility] & noted depression. On 2/2003 noted bad wave of anxiety , felt wanted to be dead & stopped Prozac & felt better. a 3/11/03 note stated He was seen on 10-3-02 with symptoms of depression and was started on Prozac 10 mg daily. Very quickly he felt anxious and at one point felt was walking through a tunnel and felt as though someone was stalking him. He realized this was not true. At another time he felt almost suicidal. He believed both of these events were related to the Prozac and stopped the med. Since then he felt definitely less anxious though still depressed. The multiple stressors with which  he presented in Oct. had escalated. He and his then wife had both been in counseling and had since , planning a divorce. At that point was started on Lexapro 10 mg & was also being evaluated for recurrent abdominal pain. Continued on Lexapro 10 mg all through 2004 & 2005. No mention of depression med in notes until 2007 note when mentioned to continue citalopram ( ? Typo) & was moving to [place].   Next seen 8/2010 at [facility] by nickie mota for a physical & noted then on tetracycline for rosacea & no longer noted to be on any antidepressant. Was just on a statin. In 2011 had an umbilical hernia surgery. In nov 2012 returned to dr Duffy for back pain & 1/30/13 reestablished care with dr Duffy. TCN no longer made/ covered & given Metrogel then. Noted also had a girlfriend at the time. In 9/2013 referred to sleep for snoring & fatigue. Seen by sleep Dr Montaño in 10/2013 & soon after that started on a CPAP. On 3/19/14 started on minocycline for rosacea. On 5/2015 seen by Dr Du for a physical & then seen 12/2015 transfer care to Dr Pradhan who was also primary for  his dad (brought in from [place] who had dementia & second wife was his primary care giver). On metformin by then. Noted care giver stress. In er 5/2/16 for kidney stones. In er admitted 6/12 to 6/14 for cellulitis right leg & associated DVT right peroneal vein. Seen for a physical 3/2017 by dr garcia. Seen by hematology Dr Chowdhury 3/2017, u/S was negative 4/2017 & eliquis discontinued as had taken for about 9 months by then. In er 6/2017 for kidney stones. Seen by [facility] for his sleep concerns,   Hx of most recent cellulitis was left lower leg in 1/2019, with prior strep & staph, hx of rosacea on minocycline in the past & on Metrogel, chronic rhinitis on Claritin, hx of prior kidney stone noted stones in bladder & left kidney an ct 2017, prior tooth extraction, tonsillectomy, on vit d, B 12, magnesium, turmeric, intolerant to fluoxetine, normal  stress test in 2011, seen by prior PCP Dr Pradhan 4/26/18 for a physical & noted then was on a keto diet. Admitted then 1/18 to 1/23/19 for cellulitis secondary O a laceration in left foot sole. U/S negative for DVT, Cx grew staph  GBS strep, evaluated by ID, HBA1c was 6.7 at the time, given vanc & zosyn then ceftriaxone then discharged on Augmentin & lymphedema treatment. Seen by Dr Woods 1/28 hospital follow up & doxycycline given. On 2/13 given keflex on patients insistence that cellulitis still there, had mostly venous stasis dermatitis. Seen 3/4 & no infection sen. given Metrogel for rosacea, LDL was elevated at 99 & cbc was normal. Did lymphedema treatment with good results & discharged wearing compressions socks from OT on 3/19    Seen 4/25 first time by this writer as PCP on maternity leave. Had severe depression, NETTIE & addressed dm, polyneuropathy, Morbid obesity, HTN , AMERICA ^ HLD. Lymphedema & venous stasis dermatitis & discoloration were under control with use of compression socks. Had no recurrence of prior DVT or kidney stones & discussed chronic abx use itz minocycline may not be wise specially given current low mood & referred to dermatology. Was restarted on lexapro, referred to psyche & day treatment & continued with his psychologist. Labs done later showed elevated microalbumin, normal CMP, TSH, cbc, with mildly elevated HBA1c. Declined colonoscopy, was to do a FIT test & deferred Tdap & shingrex to another day.     Currently feeling 75 % better. When started the lexapro had one day of lethargy, now on lexapro 10 mg the dose that helped in the past & is feeling better this past week. Is functioning and coping better. he plans to do more exercise & has joined the [employer] & has got a . He's been going to therapy & feels competent and stable past 10 days, is seeing his therapist once a week, been a couple times since started  To see psyche 5/30 & currently on the wait list for 55 +day  treatment at [place]. May go through his own therapist Dr Elham Najera. Is concerned about the time committment & difficulty with work, worried about employability. To see dermatology too at [facility]. Thankfully his insurance plan is almost maxed on deductible & with 20 % copay May not be expensive    Wondering if elevated microalbumin from taking pain pills for his back, was on aleve and motrin but has cut back     Had a bit of a cold & headache yesterday, congested, coughing, No fever or chills, no dizziness, no double or blurry vision, no facial pain, earache, sore throat, runny nose, post nasal drip, no trouble hearing, smelling, tasting or swallowing,  no chest pain, trouble breathing or palpitations, No abdominal pain, heart burn, reflux, mild nausea secondary to phelgm, or vomiting or diarrhea or constipation, no blood in stools or black stools, no weight loss or night sweats. No dysuria, hematuria, frequency, urgency, hesitancy, incontinence, No pelvic complaints. No leg joint pain. No rash. Didn't wear his compression socks 1 day as worked from home & mild lymphedema noted    Declines Tdap & Shingrex today. Knows due for FIT & dm eye check    PHQ-9 ([employer]) 5/9/2019   1.  Little interest or pleasure in doing things 2   2.  Feeling down, depressed, or hopeless 1   3.  Trouble falling or staying asleep, or sleeping too much 0   4.  Feeling tired or having little energy 1   5.  Poor appetite or overeating 1   6.  Feeling bad about yourself 1   7.  Trouble concentrating 1   8.  Moving slowly or restless 0   9.  Suicidal or self-harm thoughts 0   PHQ-9 Total Score 7   Difficulty at work, home, or with people    1.  Little interest or pleasure in doing things More than half the days   2.  Feeling down, depressed, or hopeless Several days   3.  Trouble falling or staying asleep, or sleeping too much Not at all   4.  Feeling tired or having little energy Several days   5.  Poor appetite or overeating  Several days   6.  Feeling bad about yourself Several days   7.  Trouble concentrating Several days   8.  Moving slowly or restless Not at all   9.  Suicidal or self-harm thoughts Not at all   PHQ-9 via My Chart TOTAL SCORE-----> 7 (Mild depression)   Difficulty at work, home, or with people Somewhat difficult   NETTIE-7   [employer], 2002; Used with Permission) 5/9/2019   1. Feeling nervous, anxious, or on edge Several days   2. Not being able to stop or control worrying Several days   3. Worrying too much about different things Several days   4. Trouble relaxing Not at all   5. Being so restless that it is hard to sit still Not at all   6. Becoming easily annoyed or irritable Not at all   7. Feeling afraid, as if something awful might happen Several days   NETTIE 7 TOTAL SCORE 4 (minimal anxiety)   1. Feeling nervous, anxious, or on edge 1   2. Not being able to stop or control worrying 1   3. Worrying too much about different things 1   4. Trouble relaxing 0   5. Being so restless that it is hard to sit still 0   6. Becoming easily annoyed or irritable 0   7. Feeling afraid, as if something awful might happen 1   NETTIE-7 Total Score 4   If you checked any problems, how difficult have they made it for you to do your work, take care of things at home, or get along with other people?      Reviewed and updated as needed this visit by clinical staff  Tobacco  Allergies  Meds  Med Hx  Surg Hx  Fam Hx  Soc Hx        Reviewed and updated as needed this visit by Provider         Patient Active Problem List   Diagnosis     Hyperlipidemia     Chronic rhinitis     Morbid obesity (H)     Rosacea     Type 2 diabetes mellitus with diabetic nephropathy; goal HgbA1c < 7%     Essential hypertension; goal < 140/90     History of nephrolithiasis     AMERICA (obstructive sleep apnea)     Diabetic polyneuropathy associated with type 2 diabetes mellitus (H)     History of deep venous thrombosis right peroneal vein provoked by cellulitis      Chronic acquired lymphedema     Venous stasis dermatitis of both lower extremities     Severe episode of recurrent major depressive disorder, without psychotic features (H)     Past Surgical History:   Procedure Laterality Date     HC TOOTH EXTRACTION W/FORCEP      WISDOM TEETH     HERNIA REPAIR  2011    mesh     TONSILLECTOMY      TONSILS       Social History     Tobacco Use     Smoking status: Never Smoker     Smokeless tobacco: Never Used   Substance Use Topics     Alcohol use: Yes     Alcohol/week: 0.0 oz     Comment: 1 drink monthly     Family History   Problem Relation Age of Onset     Cancer Mother         CA brain, lung, starte din tear duct,  age 52     Other Cancer Mother      Cardiovascular Father         CABG @ 74, periph. vas. disease     Alzheimer Disease Father      Chronic Obstructive Pulmonary Disease Father      Other - See Comments Father         GBS     Depression Father      Cardiovascular Brother         MI, chf dx 40,  mi age 50      Obesity Brother      Depression Other      Cancer - colorectal No family hx of          Current Outpatient Medications   Medication Sig Dispense Refill     aspirin 81 MG EC tablet Take 1 tablet (81 mg) by mouth daily 90 tablet 3     atorvastatin (LIPITOR) 40 MG tablet Take 1 tablet (40 mg) by mouth daily 90 tablet 1     Blood Glucose Monitoring Suppl (BLOOD GLUCOSE METER) KIT 1 Device daily 1 kit 2     cholecalciferol (VITAMIN D) 1000 UNIT tablet Take 1 tablet (1,000 Units) by mouth daily 100 tablet 3     Cyanocobalamin (B-12) 1000 MCG TBCR Take 1,000 mcg by mouth daily 100 tablet 1     escitalopram (LEXAPRO) 10 MG tablet Take 1 tablet (10 mg) by mouth daily 90 tablet 0     lisinopril (PRINIVIL/ZESTRIL) 10 MG tablet Take 1 tablet (10 mg) by mouth daily 90 tablet 1     loratadine (CLARITIN) 10 MG tablet Take 10 mg by mouth At Bedtime       magnesium gluconate (MAGONATE) 500 (27 Mg) MG tablet Take 500 mg by mouth At Bedtime       metFORMIN (GLUCOPHAGE-XR)  500 MG 24 hr tablet Take 2 tablets (1,000 mg) by mouth 2 times daily (with meals) 360 tablet 1     metroNIDAZOLE (METROGEL) 1 % external gel Apply topically daily 60 g 11     Multiple Vitamin (DAILY MULTIVITAMIN PO) Take 1 tablet by mouth At Bedtime        order for DME BBK 20-30 MM COMPRESSION SOCKS/ BBK FARROW CLASSIC VELCRO STRAP BINDERS WITH FOOT PIECES AND SOCK LINERS/ HYBRID SOCK 1 each 1     triamcinolone (KENALOG) 0.1 % external cream Apply topically 3 times daily 453.6 g 1     Turmeric (RA TURMERIC) 500 MG CAPS Take 1 capsule by mouth daily       Allergies   Allergen Reactions     Fluoxetine Hcl      Prozac: anxiety     Recent Labs   Lab Test 04/25/19  0923 03/04/19  1257 01/22/19  0805 01/21/19  0831  01/19/19  0923 01/18/19  1818 04/27/18  1027  03/03/17  1012  12/15/15  1334   A1C 7.5*  --   --  6.7*  --   --   --  6.6*  --  7.5*   < > 7.1*   LDL  --  99  --   --   --   --   --  77  --  87  --  98   HDL  --   --   --   --   --   --   --  37*  --  44  --  44   TRIG  --   --   --   --   --   --   --  305*  --  294*  --  288*   ALT 43  --   --   --   --   --  44 48  --  47   < > 78*   CR 0.92  --  0.95 0.90   < > 0.95 0.90 0.89   < > 0.91   < > 0.80   GFRESTIMATED 90  --  87 >90   < > 87 >90 88   < > 85   < > >90  Non  GFR Calc     GFRESTBLACK >90  --  >90 >90   < > >90 >90 >90   < > >90  African American GFR Calc     < > >90   GFR Calc     POTASSIUM 5.1  --   --   --   --  4.1 3.8 4.3   < > 5.1   < > 4.7   TSH 1.08  --   --   --   --   --   --   --   --  1.29  --   --     < > = values in this interval not displayed.      BP Readings from Last 3 Encounters:   05/09/19 129/72   04/25/19 135/69   03/04/19 131/73    Wt Readings from Last 3 Encounters:   05/09/19 144.2 kg (318 lb)   04/25/19 145.7 kg (321 lb 4 oz)   03/04/19 147.6 kg (325 lb 6.4 oz)                  Labs reviewed in EPIC    ROS:  Constitutional, HEENT, cardiovascular, pulmonary, GI, , musculoskeletal, neuro,  skin, endocrine and psych systems are negative, except as otherwise noted.    OBJECTIVE:     /72 (BP Location: Right arm, Patient Position: Sitting, Cuff Size: Adult Large)   Pulse 91   Temp 99  F (37.2  C) (Oral)   Resp 18   Wt 144.2 kg (318 lb)   SpO2 90%   BMI 41.96 kg/m    Body mass index is 41.96 kg/m .  GENERAL: alert, no distress and obese  EYES: Eyes grossly normal to inspection, PERRL and conjunctivae and sclerae normal, wearing glasses  HENT: ear canals and TM's normal, nose and mouth without ulcers or lesions  HENT: normal cephalic/atraumatic, ear canals and TM's normal, nose and mouth without ulcers or lesions, oropharynx clear, oral mucous membranes moist, sinuses: not tender and has some wax in ears  NECK: no adenopathy, no asymmetry, masses, or scars and thyroid normal to palpation  RESP: lungs clear to auscultation - no rales, rhonchi or wheezes  CV: regular rate and rhythm, normal S1 S2, no S3 or S4, no murmur, click or rub, no peripheral edema and peripheral pulses strong  ABDOMEN: soft, non tender, no hepatosplenomegaly, no masses and bowel sounds normal  MS: no gross musculoskeletal defects noted, no edema  SKIN: no suspicious lesions or rashes  NEURO: Normal strength and tone, mentation intact and speech normal  PSYCH: mentation appears normal, affect normal/bright  Diabetic foot exam: normal DP and PT pulses, nail exam normal nails without lesions and lymphedema b/l lower legs, hyperpigmentation & signs of venous stasis, dry skin, abrasion superficial scabbed no cellulitis left lower outer shin. Decreased sensation to toes with monofilament on the right.     Diagnostic Test Results:  No results found for this or any previous visit (from the past 24 hour(s)).    ASSESSMENT/PLAN:     1. Severe episode of recurrent major depressive disorder, without psychotic features (H)  Feeling better on lexapro 10 mg. Continue therapy. Continue medication. Awaiting 55 + day treatment or may get  arranged through his therapist. To keep apt with psyche  with [facility]. To see dermatology regarding rosacea options at [facility] as planned. Reminded to do stool test for colon cancer screen. To consider Tdap, shingles vaccine, one time HIV test  & Find out cost of shingrex. See back in 1 month for physical and update preventive care. 40 min apt. Will recheck micro albumin and diabetes at that point if also indicated and decide about lisinopril. Advised to Monitor feet closely & use Compression socks daily. Monitor for fever, if has fever or shortness of breath to come In to be seen, but Currently no clinical pneumonia noted.   - es citalopram (LEXAPRO) 10 MG tablet; Take 1 tablet (10 mg) by mouth daily  Dispense: 90 tablet; Refill: 0    2. NETTIE (generalized anxiety disorder)  - es citalopram (LEXAPRO) 10 MG tablet; Take 1 tablet (10 mg) by mouth daily  Dispense: 90 tablet; Refill: 0    3. Viral URI with cough  Advised Flonase 1 spray each nostril daily 2 weeks, Zyrtec 10 mg daily 2 week, mucin ex 600 mg twice a day 1 week & a humidifier in room may help. Supportive care as below. Go to the Er if worse. Follow up with primary if symptoms persist. For symptom relief I suggest tryin. Steam.  Take a long, hot shower.  Or if you don't want to get in the shower just run it with the bathroom door shut for a few minutes and breathe the steam.  2. Drink hot liquids frequently such as tea or hot water with honey and lemon.  3. Acetaminophen (Tylenol) and ibuprofen (Motrin or Advil) as needed for headache, sore throat, body aches, or fever.  4. For loosening phlegm and sputum try guaifenesin (available in many combination products and alone as plain Robitussin or plain Mucin ex) and for cough suppression you can try dextromethorphan (Delsym or combined in other products).  5. For nasal congestion try:    Nasal steroid spray such as Nasacort or Flonase, which are over-the-counter.  6. And most importantly: plenty  of rest and sleep  especially while you have a fever.     Stop smoking and avoid secondhand smoke    Drink lots of fluids such as water and clear soups. Fluids help loosen mucus. Fluids are also important because they help prevent dehydration.    Gargle with warm salt water a few times a day to relieve a sore throat. Throat sprays or lozenges may also help relieve the pain.    Avoid alcohol.    Use saline (salt water) nose drops to help loosen mucus and moisten the tender skin in your nose.  Encouraged mucin ex, warm salt water gargles, Cepacol spray, soothers/lozenges, sinus rinses (yusuf med), Flonase (2 sprays per nostril daily x 2 weeks), vitamin C, fluids and rest.  May alternate tylenol and NSAID'S (ibuprofen, Advil, aleve type products) every 4-6 hours for the next few days as needed. No need for oral antibiotic at this time.    4. Rosacea  To see dermatology.     5. Special screening for malignant neoplasms, colon  Reminded about FIT previously ordered.     6. Need for diphtheria-tetanus-pertussis (Tdap) vaccine  Declined today, will consider at physical.     7. Need for shingles vaccine  Declined today, will consider at physical.     Work on weight loss  Regular exercise  See Patient Instructions    Layla Murillo MD  [facility]

## 2019-05-10 ASSESSMENT — ANXIETY QUESTIONNAIRES: GAD7 TOTAL SCORE: 4

## 2019-05-10 ASSESSMENT — PATIENT HEALTH QUESTIONNAIRE - PHQ9: SUM OF ALL RESPONSES TO PHQ QUESTIONS 1-9: 7

## 2019-05-31 ENCOUNTER — OFFICE VISIT (OUTPATIENT)
Dept: FAMILY MEDICINE | Facility: CLINIC | Age: 59
End: 2019-05-31
Payer: COMMERCIAL

## 2019-05-31 ENCOUNTER — HOSPITAL ENCOUNTER (OUTPATIENT)
Dept: CT IMAGING | Facility: CLINIC | Age: 59
Discharge: HOME | End: 2019-05-31
Attending: FAMILY MEDICINE | Admitting: FAMILY MEDICINE
Payer: COMMERCIAL

## 2019-05-31 ENCOUNTER — ANCILLARY PROCEDURE (OUTPATIENT)
Dept: GENERAL RADIOLOGY | Facility: CLINIC | Age: 59
End: 2019-05-31
Attending: FAMILY MEDICINE
Payer: COMMERCIAL

## 2019-05-31 VITALS
TEMPERATURE: 98.1 F | HEART RATE: 81 BPM | DIASTOLIC BLOOD PRESSURE: 69 MMHG | HEIGHT: 74 IN | WEIGHT: 315 LBS | OXYGEN SATURATION: 90 % | BODY MASS INDEX: 40.43 KG/M2 | SYSTOLIC BLOOD PRESSURE: 129 MMHG | RESPIRATION RATE: 20 BRPM

## 2019-05-31 DIAGNOSIS — Z86.718 HISTORY OF DEEP VENOUS THROMBOSIS: Chronic | ICD-10-CM

## 2019-05-31 DIAGNOSIS — Z87.442 HISTORY OF NEPHROLITHIASIS: Chronic | ICD-10-CM

## 2019-05-31 DIAGNOSIS — Z23 NEED FOR SHINGLES VACCINE: ICD-10-CM

## 2019-05-31 DIAGNOSIS — Z00.00 ROUTINE HISTORY AND PHYSICAL EXAMINATION OF ADULT: Primary | ICD-10-CM

## 2019-05-31 DIAGNOSIS — Z11.4 SCREENING FOR HUMAN IMMUNODEFICIENCY VIRUS WITHOUT PRESENCE OF RISK FACTORS: ICD-10-CM

## 2019-05-31 DIAGNOSIS — Z23 NEED FOR DIPHTHERIA-TETANUS-PERTUSSIS (TDAP) VACCINE: ICD-10-CM

## 2019-05-31 DIAGNOSIS — E78.5 HYPERLIPIDEMIA, UNSPECIFIED HYPERLIPIDEMIA TYPE: Chronic | ICD-10-CM

## 2019-05-31 DIAGNOSIS — R79.81 LOW O2 SATURATION: ICD-10-CM

## 2019-05-31 DIAGNOSIS — R05.9 COUGH: ICD-10-CM

## 2019-05-31 DIAGNOSIS — I89.0 CHRONIC ACQUIRED LYMPHEDEMA: Chronic | ICD-10-CM

## 2019-05-31 DIAGNOSIS — J31.0 CHRONIC RHINITIS: Chronic | ICD-10-CM

## 2019-05-31 DIAGNOSIS — F33.2 SEVERE EPISODE OF RECURRENT MAJOR DEPRESSIVE DISORDER, WITHOUT PSYCHOTIC FEATURES (H): ICD-10-CM

## 2019-05-31 DIAGNOSIS — R80.9 MICROALBUMINURIA: ICD-10-CM

## 2019-05-31 DIAGNOSIS — Z12.11 SPECIAL SCREENING FOR MALIGNANT NEOPLASMS, COLON: ICD-10-CM

## 2019-05-31 DIAGNOSIS — E11.21 TYPE 2 DIABETES MELLITUS WITH DIABETIC NEPHROPATHY, WITHOUT LONG-TERM CURRENT USE OF INSULIN (H): Chronic | ICD-10-CM

## 2019-05-31 DIAGNOSIS — E11.42 DIABETIC POLYNEUROPATHY ASSOCIATED WITH TYPE 2 DIABETES MELLITUS (H): Chronic | ICD-10-CM

## 2019-05-31 DIAGNOSIS — G47.33 OSA (OBSTRUCTIVE SLEEP APNEA): Chronic | ICD-10-CM

## 2019-05-31 DIAGNOSIS — L71.9 ROSACEA: Chronic | ICD-10-CM

## 2019-05-31 DIAGNOSIS — E66.01 MORBID OBESITY (H): Chronic | ICD-10-CM

## 2019-05-31 DIAGNOSIS — I10 ESSENTIAL HYPERTENSION: Chronic | ICD-10-CM

## 2019-05-31 DIAGNOSIS — I87.2 VENOUS STASIS DERMATITIS OF BOTH LOWER EXTREMITIES: ICD-10-CM

## 2019-05-31 LAB
ALBUMIN SERPL-MCNC: 3.8 G/DL (ref 3.4–5)
ALP SERPL-CCNC: 120 U/L (ref 40–150)
ALT SERPL W P-5'-P-CCNC: 37 U/L (ref 0–70)
ANION GAP SERPL CALCULATED.3IONS-SCNC: 10 MMOL/L (ref 3–14)
AST SERPL W P-5'-P-CCNC: 23 U/L (ref 0–45)
BILIRUB SERPL-MCNC: 2.2 MG/DL (ref 0.2–1.3)
BUN SERPL-MCNC: 24 MG/DL (ref 7–30)
CALCIUM SERPL-MCNC: 9 MG/DL (ref 8.5–10.1)
CHLORIDE SERPL-SCNC: 107 MMOL/L (ref 94–109)
CHOLEST SERPL-MCNC: 179 MG/DL
CO2 SERPL-SCNC: 23 MMOL/L (ref 20–32)
CREAT BLD-MCNC: 0.8 MG/DL (ref 0.66–1.25)
CREAT SERPL-MCNC: 0.85 MG/DL (ref 0.66–1.25)
CREAT UR-MCNC: 147 MG/DL
GFR SERPL CREATININE-BSD FRML MDRD: >90 ML/MIN/{1.73_M2}
GFR SERPL CREATININE-BSD FRML MDRD: >90 ML/MIN/{1.73_M2}
GLUCOSE SERPL-MCNC: 174 MG/DL (ref 70–99)
HBA1C MFR BLD: 5.5 % (ref 0–5.6)
HDLC SERPL-MCNC: 34 MG/DL
HIV 1+2 AB+HIV1 P24 AG SERPL QL IA: NONREACTIVE
LDLC SERPL CALC-MCNC: ABNORMAL MG/DL
LDLC SERPL DIRECT ASSAY-MCNC: 79 MG/DL
MICROALBUMIN UR-MCNC: 111 MG/L
MICROALBUMIN/CREAT UR: 75.51 MG/G CR (ref 0–17)
NONHDLC SERPL-MCNC: 145 MG/DL
POTASSIUM SERPL-SCNC: 4.2 MMOL/L (ref 3.4–5.3)
PROT SERPL-MCNC: 6.9 G/DL (ref 6.8–8.8)
SODIUM SERPL-SCNC: 140 MMOL/L (ref 133–144)
TRIGL SERPL-MCNC: 565 MG/DL

## 2019-05-31 PROCEDURE — 71260 CT THORAX DX C+: CPT

## 2019-05-31 PROCEDURE — 99213 OFFICE O/P EST LOW 20 MIN: CPT | Mod: 25 | Performed by: FAMILY MEDICINE

## 2019-05-31 PROCEDURE — 36415 COLL VENOUS BLD VENIPUNCTURE: CPT | Performed by: FAMILY MEDICINE

## 2019-05-31 PROCEDURE — 82565 ASSAY OF CREATININE: CPT

## 2019-05-31 PROCEDURE — 80053 COMPREHEN METABOLIC PANEL: CPT | Performed by: FAMILY MEDICINE

## 2019-05-31 PROCEDURE — 83721 ASSAY OF BLOOD LIPOPROTEIN: CPT | Performed by: FAMILY MEDICINE

## 2019-05-31 PROCEDURE — 87389 HIV-1 AG W/HIV-1&-2 AB AG IA: CPT | Performed by: FAMILY MEDICINE

## 2019-05-31 PROCEDURE — 80061 LIPID PANEL: CPT | Performed by: FAMILY MEDICINE

## 2019-05-31 PROCEDURE — 25000128 H RX IP 250 OP 636: Performed by: FAMILY MEDICINE

## 2019-05-31 PROCEDURE — 83036 HEMOGLOBIN GLYCOSYLATED A1C: CPT | Performed by: FAMILY MEDICINE

## 2019-05-31 PROCEDURE — 25000125 ZZHC RX 250: Performed by: FAMILY MEDICINE

## 2019-05-31 PROCEDURE — 71046 X-RAY EXAM CHEST 2 VIEWS: CPT

## 2019-05-31 PROCEDURE — 99396 PREV VISIT EST AGE 40-64: CPT | Performed by: FAMILY MEDICINE

## 2019-05-31 PROCEDURE — 82043 UR ALBUMIN QUANTITATIVE: CPT | Performed by: FAMILY MEDICINE

## 2019-05-31 RX ORDER — IOPAMIDOL 755 MG/ML
83 INJECTION, SOLUTION INTRAVASCULAR ONCE
Status: COMPLETED | OUTPATIENT
Start: 2019-05-31 | End: 2019-05-31

## 2019-05-31 RX ADMIN — SODIUM CHLORIDE 100 ML: 9 INJECTION, SOLUTION INTRAVENOUS at 15:27

## 2019-05-31 RX ADMIN — IOPAMIDOL 83 ML: 755 INJECTION, SOLUTION INTRAVENOUS at 15:25

## 2019-05-31 ASSESSMENT — MIFFLIN-ST. JEOR: SCORE: 2318.79

## 2019-05-31 ASSESSMENT — ENCOUNTER SYMPTOMS
COUGH: 0
DYSURIA: 0
FREQUENCY: 0
HEMATURIA: 0
HEMATOCHEZIA: 0
NAUSEA: 0
CHILLS: 0
FEVER: 0
MYALGIAS: 0
DIARRHEA: 0
HEARTBURN: 0
ARTHRALGIAS: 0
PARESTHESIAS: 0
JOINT SWELLING: 0
HEADACHES: 0
DIZZINESS: 0
PALPITATIONS: 0
ABDOMINAL PAIN: 0
WEAKNESS: 0
EYE PAIN: 0
SORE THROAT: 0
NERVOUS/ANXIOUS: 0
CONSTIPATION: 0
SHORTNESS OF BREATH: 0

## 2019-05-31 ASSESSMENT — ANXIETY QUESTIONNAIRES
3. WORRYING TOO MUCH ABOUT DIFFERENT THINGS: NOT AT ALL
IF YOU CHECKED OFF ANY PROBLEMS ON THIS QUESTIONNAIRE, HOW DIFFICULT HAVE THESE PROBLEMS MADE IT FOR YOU TO DO YOUR WORK, TAKE CARE OF THINGS AT HOME, OR GET ALONG WITH OTHER PEOPLE: NOT DIFFICULT AT ALL
GAD7 TOTAL SCORE: 0
6. BECOMING EASILY ANNOYED OR IRRITABLE: NOT AT ALL
5. BEING SO RESTLESS THAT IT IS HARD TO SIT STILL: NOT AT ALL
2. NOT BEING ABLE TO STOP OR CONTROL WORRYING: NOT AT ALL
7. FEELING AFRAID AS IF SOMETHING AWFUL MIGHT HAPPEN: NOT AT ALL
1. FEELING NERVOUS, ANXIOUS, OR ON EDGE: NOT AT ALL

## 2019-05-31 ASSESSMENT — PATIENT HEALTH QUESTIONNAIRE - PHQ9
SUM OF ALL RESPONSES TO PHQ QUESTIONS 1-9: 0
5. POOR APPETITE OR OVEREATING: NOT AT ALL

## 2019-05-31 NOTE — RESULT ENCOUNTER NOTE
Rosey Mr. Vazquez,  Your results came back and are within acceptable limits. -HIV test is normal.. If you have any further concerns please do not hesitate to contact us by message, phone or making an appointment.  Have a good day   Dr Chidi MCKEE

## 2019-05-31 NOTE — RESULT ENCOUNTER NOTE
Rosey Vazquez,  Good news. Your results came back and show no blood clots in lung, no pneumonia, no aortic dissection or aneurysm.  No punctured lung.   The scan does show stable adrenal gland thickening, unchanged from before and gall stones without evidence of inflammation  Suspect the low oxygen is related to weight related shallow breathing and sleep apnea, see your sleep specialist for adjustments of CPAP if indicated and continue to work on your activity and weight loss.   The ct scan also showed incidentally some coronary artery calcifications that can suggest coronary artery disease and it might be wise to see a cardiologist as a preventive measure at the clinic. If agreeable I can put in the referral and you can see them at your convenience . We have a cardiologist who is here every  Thursday.  If you have any further concerns please do not hesitate to contact us by message, phone or making an appointment.  Have a good day   Dr Chidi MCKEE

## 2019-05-31 NOTE — PROGRESS NOTES
SUBJECTIVE:   CC: Peterson Vazquez is an 58 year old male who presents for preventative health visit.     Healthy Habits:     Getting at least 3 servings of Calcium per day:  Yes    Bi-annual eye exam:  Yes    Dental care twice a year:  Yes    Sleep apnea or symptoms of sleep apnea:  Sleep apnea    Diet:  Regular (no restrictions)    Frequency of exercise:  2-3 days/week    Duration of exercise:  45-60 minutes    Taking medications regularly:  Yes    Medication side effects:  None    PHQ-2 Total Score: 0    Additional concerns today:  No      Medical assistant advised patient about addressing additional health concerns that could be billed, as it is not considered a part of a preventive physical. Patient verbalized understanding.    Layla Murillo MD on 5/31/2019 at 8:52 AM    Hx of morbid obesity, DM on asa & metformin, DM nephropathy DM polyneuropathy, HLD on Lipitor, HTN on lisinopril, AMERICA on CPAP, chronic acquired lymphedema on compression socks, hx of prior DVT right peroneal proximal calf in 2016 on eliquis then 3 months as secondary to cellulitis at that time. Seen by hematology & recheck u/S after 3 months anticoagulation showed no DVT & advised no long term anticoagulation at that time.   Seen first by Dr Duffy in 1/2003 as a hospital follow up from [facility] & noted depression. On 2/2003 noted bad wave of anxiety , felt wanted to be dead & stopped Prozac & felt better. a 3/11/03 note stated He was seen on 10-3-02 with symptoms of depression and was started on Prozac 10 mg daily. Very quickly he felt anxious and at one point felt was walking through a tunnel and felt as though someone was stalking him. He realized this was not true. At another time he felt almost suicidal. He believed both of these events were related to the Prozac and stopped the med. Since then he felt definitely less anxious though still depressed. The multiple stressors with which he presented in Oct. had escalated. He and his then wife had  both been in counseling and had since , planning a divorce. At that point was started on Lexapro 10 mg & was also being evaluated for recurrent abdominal pain. Continued on Lexapro 10 mg all through 2004 & 2005. No mention of depression med in notes until 2007 note when mentioned to continue citalopram ( ? Typo) & was moving to [place].   Next seen 8/2010 at [facility] by nickie mota for a physical & noted then on tetracycline for rosacea & no longer noted to be on any antidepressant. Was just on a statin. In 2011 had an umbilical hernia surgery. In nov 2012 returned to dr Duffy for back pain & 1/30/13 reestablished care with dr Duffy. TCN no longer made/ covered & given Metrogel then. Noted also had a girlfriend at the time. In 9/2013 referred to sleep for snoring & fatigue. Seen by sleep Dr Mnotaño in 10/2013 & soon after that started on a CPAP. On 3/19/14 started on minocycline for rosacea. On 5/2015 seen by Dr Du for a physical & then seen 12/2015 transfer care to Dr Pradhan who was also primary for  his dad (brought in from [place] who had dementia & second wife was his primary care giver). On metformin by then. Noted care giver stress. In er 5/2/16 for kidney stones. In er admitted 6/12 to 6/14 for cellulitis right leg & associated DVT right peroneal vein. Seen for a physical 3/2017 by dr garcia. Seen by hematology Dr Chowdhury 3/2017, u/S was negative 4/2017 & eliquis discontinued as had taken for about 9 months by then. In er 6/2017 for kidney stones. Seen by [facility] for his sleep concerns,   Hx of most recent cellulitis was left lower leg in 1/2019, with prior strep & staph, hx of rosacea on minocycline in the past & on Metrogel, chronic rhinitis on Claritin, hx of prior kidney stone noted stones in bladder & left kidney an ct 2017, prior tooth extraction, tonsillectomy, on vit d, B 12, magnesium, turmeric, intolerant to fluoxetine, normal stress test in 2011, seen by prior PCP Dr Pradhan 4/26/18 for a  physical & noted then was on a keto diet. Admitted then 1/18 to 1/23/19 for cellulitis secondary O a laceration in left foot sole. U/S negative for DVT, Cx grew staph  GBS strep, evaluated by ID, HBA1c was 6.7 at the time, given vanc & zosyn then ceftriaxone then discharged on Augmentin & lymphedema treatment. Seen by Dr Woods 1/28 hospital follow up & doxycycline given. On 2/13 given keflex on patients insistence that cellulitis still there, had mostly venous stasis dermatitis. Seen 3/4 & no infection sen. given Metrogel for rosacea, LDL was elevated at 99 & cbc was normal. Did lymphedema treatment with good results & discharged wearing compressions socks from OT on 3/19  Seen 4/25 first time by this writer as PCP on maternity leave. Had severe depression, NETTIE & addressed dm, polyneuropathy, Morbid obesity, HTN , AMERICA ^ HLD. Lymphedema & venous stasis dermatitis & discoloration were under control with use of compression socks. Had no recurrence of prior DVT or kidney stones & discussed chronic abx use itz minocycline may not be wise specially given current low mood & referred to dermatology. Was restarted on Lexapro, referred to psyche & day treatment & continued with his psychologist. Labs done later showed elevated microalbumin, normal CMP, TSH, cbc, with mildly elevated HBA1c. Declined colonoscopy, was to do a FIT test & deferred Tdap & shingrex to another day.   Seen 5/9 was feeling 75 % better. When started the Lexapro had one day of lethargy, now on Lexapro 10 mg the dose that helped in the past & is feeling better this past week. Is functioning and coping better. he plans to do more exercise & has joined the [employer] & has got a . He's been going to therapy & feels competent and stable past 10 days, is seeing his therapist once a week, been a couple times since started  To see psyche 5/30 & currently on the wait list for 55 +day treatment at [facility]. May go through his own therapist Dr Lizarraga  Reinaldo. Is concerned about the time commitment & difficulty with work, worried about employability. To see dermatology too at [facility].   Was to keep apt with psyche 5/30 with [facility].   Was reminded to do a stool test for colon cancer screen. To consider Tdap, shingles vaccine, one time HIV test  & was to find out cost of shingrex.     Here for a physical. Wife out of town for her cousin who has pneumonia.   Notes his Mood is better, is seeing his therapist  Initial apt psyche yesterday but psyche was sick so now rescheduled to next week  DM HBA1c 5.5  Joined the Y, is exercising , though not all this week, as also had car trouble, had to get towed, wasted time on that   BP is better,  Is using his CPAP  Has been wearing his compression socks  Leg swelling is good  Uses Velcro strap system to  Ha da couple left leg chafing sores used neosporin   No pain in legs like when had DVT  viral uri better, has a Runny nose chronic as its allergy season now   Coughing a lot   To do FIT tomorrow  Wants to wait to get the Tdap and shingrex till wife is back in case has side effects.  o2 sat noted low at 88 % when first walked in but recheck was better. Reports no symptoms    No fever or chills, no headache or dizziness, no double or blurry vision, no facial pain, earache, sore throat, post nasal drip, no trouble hearing, smelling, tasting or swallowing,  no chest pain, no trouble breathing or palpitations, No abdominal pain, heart burn, reflux, nausea or vomiting or diarrhea or constipation, no blood in stools or black stools, no weight loss or night sweats. No dysuria, hematuria, frequency, urgency, hesitancy, incontinence, No pelvic complaints. No joint pain. No rash.    PHQ-9 (Pfizer) 5/31/2019   1.  Little interest or pleasure in doing things 0   2.  Feeling down, depressed, or hopeless 0   3.  Trouble falling or staying asleep, or sleeping too much 0   4.  Feeling tired or having little energy 0   5.  Poor appetite  or overeating 0   6.  Feeling bad about yourself 0   7.  Trouble concentrating 0   8.  Moving slowly or restless 0   9.  Suicidal or self-harm thoughts 0   PHQ-9 Total Score 0   Difficulty at work, home, or with people Not difficult at all   1.  Little interest or pleasure in doing things    2.  Feeling down, depressed, or hopeless    3.  Trouble falling or staying asleep, or sleeping too much    4.  Feeling tired or having little energy    5.  Poor appetite or overeating    6.  Feeling bad about yourself    7.  Trouble concentrating    8.  Moving slowly or restless    9.  Suicidal or self-harm thoughts    PHQ-9 via [employer] TOTAL SCORE----->    Difficulty at work, home, or with people    NETTIE-7   [employer] Inc, 2002; Used with Permission) 5/31/2019   1. Feeling nervous, anxious, or on edge    2. Not being able to stop or control worrying    3. Worrying too much about different things    4. Trouble relaxing    5. Being so restless that it is hard to sit still    6. Becoming easily annoyed or irritable    7. Feeling afraid, as if something awful might happen    NETTIE 7 TOTAL SCORE    1. Feeling nervous, anxious, or on edge 0   2. Not being able to stop or control worrying 0   3. Worrying too much about different things 0   4. Trouble relaxing 0   5. Being so restless that it is hard to sit still 0   6. Becoming easily annoyed or irritable 0   7. Feeling afraid, as if something awful might happen 0   NETTIE-7 Total Score 0   If you checked any problems, how difficult have they made it for you to do your work, take care of things at home, or get along with other people? Not difficult at all     Today's PHQ-2 Score:   PHQ-2 ( 1999 Pfizer) 5/31/2019   Q1: Little interest or pleasure in doing things 0   Q2: Feeling down, depressed or hopeless 0   PHQ-2 Score 0   Q1: Little interest or pleasure in doing things Not at all   Q2: Feeling down, depressed or hopeless Not at all   PHQ-2 Score 0       Abuse: Current or Past(Physical,  Sexual or Emotional)- No  Do you feel safe in your environment? Yes    Social History     Tobacco Use     Smoking status: Never Smoker     Smokeless tobacco: Never Used   Substance Use Topics     Alcohol use: Yes     Alcohol/week: 0.0 oz     Comment: 1 drink monthly     If you drink alcohol do you typically have >3 drinks per day or >7 drinks per week? No    Alcohol Use 5/31/2019   Prescreen: >3 drinks/day or >7 drinks/week? No   Prescreen: >3 drinks/day or >7 drinks/week? -       Last PSA: No results found for: PSA    Reviewed orders with patient. Reviewed health maintenance and updated orders accordingly - Yes  Lab work is in process  Labs reviewed in EPIC  BP Readings from Last 3 Encounters:   05/31/19 129/69   05/09/19 129/72   04/25/19 135/69    Wt Readings from Last 3 Encounters:   05/31/19 143.9 kg (317 lb 4 oz)   05/09/19 144.2 kg (318 lb)   04/25/19 145.7 kg (321 lb 4 oz)                  Patient Active Problem List   Diagnosis     Hyperlipidemia     Chronic rhinitis     Morbid obesity (H)     Rosacea     Type 2 diabetes mellitus with diabetic nephropathy; goal HgbA1c < 7%     Essential hypertension; goal < 140/90     History of nephrolithiasis     AMERICA (obstructive sleep apnea)     Diabetic polyneuropathy associated with type 2 diabetes mellitus (H)     History of deep venous thrombosis right peroneal vein provoked by cellulitis     Chronic acquired lymphedema     Venous stasis dermatitis of both lower extremities     Severe episode of recurrent major depressive disorder, without psychotic features (H)     Microalbuminuria     Past Surgical History:   Procedure Laterality Date     HC TOOTH EXTRACTION W/FORCEP      WISDOM TEETH     HERNIA REPAIR  2011    mesh     TONSILLECTOMY      TONSILS       Social History     Tobacco Use     Smoking status: Never Smoker     Smokeless tobacco: Never Used   Substance Use Topics     Alcohol use: Yes     Alcohol/week: 0.0 oz     Comment: 1 drink monthly     Family History    Problem Relation Age of Onset     Cancer Mother         CA brain, lung, starte din tear duct,  age 52     Other Cancer Mother      Cardiovascular Father         CABG @ 74, periph. vas. disease     Alzheimer Disease Father      Chronic Obstructive Pulmonary Disease Father      Other - See Comments Father         GBS     Depression Father      Cardiovascular Brother         MI, chf dx 40,  mi age 50      Obesity Brother      Depression Other      Cancer - colorectal No family hx of          Current Outpatient Medications   Medication Sig Dispense Refill     aspirin 81 MG EC tablet Take 1 tablet (81 mg) by mouth daily 90 tablet 3     atorvastatin (LIPITOR) 40 MG tablet Take 1 tablet (40 mg) by mouth daily 90 tablet 1     Blood Glucose Monitoring Suppl (BLOOD GLUCOSE METER) KIT 1 Device daily 1 kit 2     cholecalciferol (VITAMIN D) 1000 UNIT tablet Take 1 tablet (1,000 Units) by mouth daily 100 tablet 3     Cyanocobalamin (B-12) 1000 MCG TBCR Take 1,000 mcg by mouth daily 100 tablet 1     escitalopram (LEXAPRO) 10 MG tablet Take 1 tablet (10 mg) by mouth daily 90 tablet 0     lisinopril (PRINIVIL/ZESTRIL) 10 MG tablet Take 1 tablet (10 mg) by mouth daily 90 tablet 1     loratadine (CLARITIN) 10 MG tablet Take 10 mg by mouth At Bedtime       magnesium gluconate (MAGONATE) 500 (27 Mg) MG tablet Take 500 mg by mouth At Bedtime       metFORMIN (GLUCOPHAGE-XR) 500 MG 24 hr tablet Take 2 tablets (1,000 mg) by mouth 2 times daily (with meals) 360 tablet 1     metroNIDAZOLE (METROGEL) 1 % external gel Apply topically daily 60 g 11     Multiple Vitamin (DAILY MULTIVITAMIN PO) Take 1 tablet by mouth At Bedtime        order for DME BBK 20-30 MM COMPRESSION SOCKS/ BBK FARROW CLASSIC VELCRO STRAP BINDERS WITH FOOT PIECES AND SOCK LINERS/ HYBRID SOCK 1 each 1     triamcinolone (KENALOG) 0.1 % external cream Apply topically 3 times daily 453.6 g 1     Turmeric (RA TURMERIC) 500 MG CAPS Take 1 capsule by mouth daily        Allergies   Allergen Reactions     Fluoxetine Hcl      Prozac: anxiety     Recent Labs   Lab Test 05/31/19  1517 05/31/19  0830 04/25/19  0923 03/04/19  1257  01/21/19  0831  01/18/19  1818 04/27/18  1027  03/03/17  1012   A1C  --  5.5 7.5*  --   --  6.7*  --   --  6.6*  --  7.5*   LDL  --  Cannot estimate LDL when triglyceride exceeds 400 mg/dL  79  --  99  --   --   --   --  77  --  87   HDL  --  34*  --   --   --   --   --   --  37*  --  44   TRIG  --  565*  --   --   --   --   --   --  305*  --  294*   ALT  --  37 43  --   --   --   --  44 48  --  47   CR  --  0.85 0.92  --    < > 0.90   < > 0.90 0.89   < > 0.91   GFRESTIMATED >90 >90 90  --    < > >90   < > >90 88   < > 85   GFRESTBLACK >90 >90 >90  --    < > >90   < > >90 >90   < > >90  African American GFR Calc     POTASSIUM  --  4.2 5.1  --   --   --    < > 3.8 4.3   < > 5.1   TSH  --   --  1.08  --   --   --   --   --   --   --  1.29    < > = values in this interval not displayed.        Reviewed and updated as needed this visit by clinical staff  Tobacco  Allergies  Meds  Med Hx  Surg Hx  Fam Hx  Soc Hx        Reviewed and updated as needed this visit by Provider  Tobacco  Allergies  Meds  Med Hx  Surg Hx  Fam Hx  Soc Hx       Past Medical History:   Diagnosis Date     Cellulitis of left lower extremity 1/18/2019     Chronic acquired lymphedema 1/19/2019     Chronic rhinitis      Depressive disorder      DVT (deep venous thrombosis) (H)     R peroneal vein in 2016     History of depression 2003     HTN (hypertension) 4/30/2013     Morbid obesity (H)      Other and unspecified hyperlipidemia      Type II or unspecified type diabetes mellitus without mention of complication, not stated as uncontrolled 7-05      Past Surgical History:   Procedure Laterality Date     HC TOOTH EXTRACTION W/FORCEP      WISDOM TEETH     HERNIA REPAIR  2011    mesh     TONSILLECTOMY      TONSILS     OB History   No data available       Review of Systems  "  Constitutional: Negative for chills and fever.   HENT: Negative for congestion, ear pain, hearing loss and sore throat.    Eyes: Negative for pain and visual disturbance.   Respiratory: Negative for cough and shortness of breath.    Cardiovascular: Negative for chest pain, palpitations and peripheral edema.   Gastrointestinal: Negative for abdominal pain, constipation, diarrhea, heartburn, hematochezia and nausea.   Genitourinary: Negative for discharge, dysuria, frequency, genital sores, hematuria, impotence and urgency.   Musculoskeletal: Negative for arthralgias, joint swelling and myalgias.   Skin: Negative for rash.   Neurological: Negative for dizziness, weakness, headaches and paresthesias.   Psychiatric/Behavioral: Negative for mood changes. The patient is not nervous/anxious.      OBJECTIVE:   /69 (BP Location: Left arm, Patient Position: Chair, Cuff Size: Adult Large)   Pulse 81   Temp 98.1  F (36.7  C) (Oral)   Resp 20   Ht 1.88 m (6' 2\")   Wt 143.9 kg (317 lb 4 oz)   SpO2 90%   BMI 40.73 kg/m      Physical Exam  GENERAL: alert, no distress and obese  EYES: Eyes grossly normal to inspection, PERRL and conjunctivae and sclerae normal  HENT: ear canals and TM's normal, nose and mouth without ulcers or lesions  NECK: no adenopathy, no asymmetry, masses, or scars and thyroid normal to palpation  RESP: lungs clear to auscultation - no rales, rhonchi or wheezes  CV: regular rates and rhythm, normal S1 S2, no S3 or S4, no murmur, click or rub, peripheral pulses strong and trace+ bilateral lower extremity.     ABDOMEN: soft, non tender, no hepatosplenomegaly, no masses and bowel sounds normal. Declined  exam.  MS: extremities normal- no gross deformities noted  SKIN: no suspicious lesions or rashes, healed abrasions left lower outer shin  NEURO: Normal strength and tone, mentation intact and speech normal  PSYCH: mentation appears normal, affect normal/bright, anxious, judgement and insight " intact and appearance well groomed    Diagnostic Test Results:  Labs reviewed in Epic  No results found for this or any previous visit (from the past 24 hour(s)).    ASSESSMENT/PLAN:   1. Routine history and physical examination of adult   declined. cxr today normal. Due to low sat and cough may need to do cta chest. Cxr normal. Labs today pending. Diabetes  & BP look good. Due for a dm eye check. Keep derm apt and psyche apt next week. Continue with therapy. Consider day treatment though own therapist group. Continue CPAP. See derm next week as planned. Consider PSA next time.   Tdap and shingles when wife back.  - HIV Antigen Antibody Combo  - LDL cholesterol direct  - LDL cholesterol direct    2. Type 2 diabetes mellitus with diabetic nephropathy, without long-term current use of insulin (H)  Well controlled on meds   - Hemoglobin A1c  - Comprehensive metabolic panel    3. Diabetic polyneuropathy associated with type 2 diabetes mellitus (H)  - Hemoglobin A1c  - Albumin Random Urine Quantitative with Creat Ratio  - Comprehensive metabolic panel    4. Microalbuminuria  On ACE  - Albumin Random Urine Quantitative with Creat Ratio  - Comprehensive metabolic panel    5. Essential hypertension; goal < 140/90  BP well controlled.   - Albumin Random Urine Quantitative with Creat Ratio  - Comprehensive metabolic panel    6. Hyperlipidemia, unspecified hyperlipidemia type  On statin  - Lipid panel reflex to direct LDL Fasting    7. Morbid obesity (H)  - Lipid panel reflex to direct LDL Fasting    8. Cough  - XR Chest 2 Views; Future    9. Low O2 saturation  Cxr normal so will get a cta chest to rule out a PE  - XR Chest 2 Views; Future    10. AMERICA (obstructive sleep apnea)  Continue CPAP    11. Chronic acquired lymphedema  Stable with compressions socks.    12. Severe episode of recurrent major depressive disorder, without psychotic features (H)  Improved on Lexapro    13. Rosacea  To see derm next wee.     14. History of  "nephrolithiasis  Asymptomatic currently.   - Comprehensive metabolic panel    15. Venous stasis dermatitis of both lower extremities  Resolved currently .     16. History of deep venous thrombosis right peroneal vein provoked by cellulitis  Asymptomatic currently.     17. Chronic rhinitis  Try Flonase OTC    18. Special screening for malignant neoplasms, colon  Reminded to send in the FIT test.     19. Screening for human immunodeficiency virus without presence of risk factor  - HIV Antigen Antibody Combo    20. Need for diphtheria-tetanus-pertussis (Tdap) vaccine  Will return another day for this.     21. Need for shingles vaccine  Will return another day for this.     COUNSELING:   Reviewed preventive health counseling, as reflected in patient instructions       Regular exercise       Healthy diet/nutrition       Vision screening       Hearing screening       Immunizations    Declined: TDAP and Zoster due to Concerns about side effects/safety               Aspirin Prophylaxsis       Alcohol Use       HIV screeninx in teen years, 1x in adult years, and at intervals if high risk       Colon cancer screening       Advance Care Planning    Estimated body mass index is 40.73 kg/m  as calculated from the following:    Height as of this encounter: 1.88 m (6' 2\").    Weight as of this encounter: 143.9 kg (317 lb 4 oz).     Weight management plan: Discussed healthy diet and exercise guidelines     reports that he has never smoked. He has never used smokeless tobacco.      Counseling Resources:  ATP IV Guidelines  Pooled Cohorts Equation Calculator  FRAX Risk Assessment  ICSI Preventive Guidelines  Dietary Guidelines for Americans,   USDA's MyPlate  ASA Prophylaxis  Lung CA Screening    Layla Murillo MD  [facility]  "

## 2019-05-31 NOTE — RESULT ENCOUNTER NOTE
Rosey Vazquez,  Your results came back as follows   -LDL(bad) cholesterol level is mildly elevated continue current dose of statin but your HDL is low & your triglycerides are quite elevated which can increase your heart disease risk.  A diet high in fat and simple carbohydrates, genetics and being overweight can contribute to this. ADVISE: exercising 150 minutes of aerobic exercise per week (30 minutes for 5 days per week or 50 minutes for 3 days per week are options), eating a low saturated fat/low carbohydrate diet, and omega-3 fatty acids (fish oil) 5395-0860 mg daily are helpful to improve this. In 3 months, you should recheck your fasting cholesterol panel.   -Liver and gallbladder tests (ALT,AST, Alk phos,  are normal. But bilirubin is elevated which can be due to the presence of gall stone. Monitor for jaundice ( yellow discoloration of skin,eyes, pale stools, dark urine) & if occurs call us. Could also be from being dehydrated and not drinking enough water in presence of gallstones.  -Kidney function (GFR) is normal.  -Sodium is normal.  -Potassium is normal.  -Calcium is normal.  -Glucose is elevated due to your diabetes.  -Microalbumin (urine protein) level is elevated but slightly improved from before. This is suggestive of early damage to your kidneys from high blood pressure and diabetes.  ADVISE: avoiding anti-inflamatory agents such as ibuprofen (Advil, Motrin) or naproxen (Aleve) as much as possible, keeping your blood pressure in a normal range, and continuing your medication (lisinopril) that helps protect your kidneys.  Also, this should be rechecked in 1 year.  If you have any further concerns please do not hesitate to contact us by message, phone or making an appointment.  Have a good day   Dr Chidi MCKEE

## 2019-05-31 NOTE — PATIENT INSTRUCTIONS
cxr today normal  Due to low sat and cough may need to do cta chest   Labs today pending  Diabetes looks good  See diabetes eye   Keep derm apt and psyche apt next week  Continue with therapy   Consider day treatment thought own therapist group  Tdap and shingles when wife back        Preventive Health Recommendations  Male Ages 50 - 64    Yearly exam:             See your health care provider every year in order to  o   Review health changes.   o   Discuss preventive care.    o   Review your medicines if your doctor has prescribed any.     Have a cholesterol test every 5 years, or more frequently if you are at risk for high cholesterol/heart disease.     Have a diabetes test (fasting glucose) every three years. If you are at risk for diabetes, you should have this test more often.     Have a colonoscopy at age 50, or have a yearly FIT test (stool test). These exams will check for colon cancer.      Talk with your health care provider about whether or not a prostate cancer screening test (PSA) is right for you.    You should be tested each year for STDs (sexually transmitted diseases), if you re at risk.     Shots: Get a flu shot each year. Get a tetanus shot every 10 years.     Nutrition:    Eat at least 5 servings of fruits and vegetables daily.     Eat whole-grain bread, whole-wheat pasta and brown rice instead of white grains and rice.     Get adequate Calcium and Vitamin D.     Lifestyle    Exercise for at least 150 minutes a week (30 minutes a day, 5 days a week). This will help you control your weight and prevent disease.     Limit alcohol to one drink per day.     No smoking.     Wear sunscreen to prevent skin cancer.     See your dentist every six months for an exam and cleaning.     See your eye doctor every 1 to 2 years.

## 2019-05-31 NOTE — PROGRESS NOTES
"  SUBJECTIVE:   CC: Peterson Vazquez is an 58 year old male who presents for preventive health visit.     Healthy Habits:    Do you get at least three servings of calcium containing foods daily (dairy, green leafy vegetables, etc.)? { :232600::\"yes\"}    Amount of exercise or daily activities, outside of work: { :693885}    Problems taking medications regularly { :615724::\"No\"}    Medication side effects: { :023267::\"No\"}    Have you had an eye exam in the past two years? { :080031}    Do you see a dentist twice per year? { :765364}    Do you have sleep apnea, excessive snoring or daytime drowsiness?{ :810216}  {Outside tests to abstract? :952895}    {additional problems to add (Optional):950688}    Today's PHQ-2 Score:   PHQ-2 ( 1999 [employer]) 5/31/2019 3/4/2019   Q1: Little interest or pleasure in doing things 0 0   Q2: Feeling down, depressed or hopeless 0 0   PHQ-2 Score 0 0   Q1: Little interest or pleasure in doing things Not at all -   Q2: Feeling down, depressed or hopeless Not at all -   PHQ-2 Score 0 -     {PHQ-2 LOOK IN ASSESSMENTS (Optional) :118714}  Abuse: Current or Past(Physical, Sexual or Emotional)- {YES/NO/NA:954924}  Do you feel safe in your environment? {YES/NO/NA:738637}    Social History     Tobacco Use     Smoking status: Never Smoker     Smokeless tobacco: Never Used   Substance Use Topics     Alcohol use: Yes     Alcohol/week: 0.0 oz     Comment: 1 drink monthly     If you drink alcohol do you typically have >3 drinks per day or >7 drinks per week? {ETOH :385521}                      Last PSA: No results found for: PSA    Reviewed orders with patient. Reviewed health maintenance and updated orders accordingly - {Yes/No:024393::\"Yes\"}  {Chronicprobdata (Optional):655247}    Reviewed and updated as needed this visit by clinical staff  Tobacco  Allergies  Meds  Med Hx  Surg Hx  Fam Hx  Soc Hx        Reviewed and updated as needed this visit by Provider        {HISTORY OPTIONS " "(Optional):721440}    ROS:  { :307700::\"CONSTITUTIONAL: NEGATIVE for fever, chills, change in weight\",\"INTEGUMENTARY/SKIN: NEGATIVE for worrisome rashes, moles or lesions\",\"EYES: NEGATIVE for vision changes or irritation\",\"ENT: NEGATIVE for ear, mouth and throat problems\",\"RESP: NEGATIVE for significant cough or SOB\",\"CV: NEGATIVE for chest pain, palpitations or peripheral edema\",\"GI: NEGATIVE for nausea, abdominal pain, heartburn, or change in bowel habits\",\" male: negative for dysuria, hematuria, decreased urinary stream, erectile dysfunction, urethral discharge\",\"MUSCULOSKELETAL: NEGATIVE for significant arthralgias or myalgia\",\"NEURO: NEGATIVE for weakness, dizziness or paresthesias\",\"PSYCHIATRIC: NEGATIVE for changes in mood or affect\"}    OBJECTIVE:   There were no vitals taken for this visit.  EXAM:  {Exam Choices:323624}    {Diagnostic Test Results (Optional):526998::\"Diagnostic Test Results:\",\"Labs reviewed in Epic\"}    ASSESSMENT/PLAN:   {Diag Picklist:592024}    COUNSELING:  {MALE COUNSELING MESSAGES:049144::\"Reviewed preventive health counseling, as reflected in patient instructions\"}    Estimated body mass index is 41.96 kg/m  as calculated from the following:    Height as of 4/25/19: 1.854 m (6' 1\").    Weight as of 5/9/19: 144.2 kg (318 lb).    {Weight Management Plan (ACO) Complete if BMI is abnormal-  Ages 18-64  BMI >24.9.  Age 65+ with BMI <23 or >30 (Optional):896975}     reports that he has never smoked. He has never used smokeless tobacco.  {Tobacco Cessation -- Complete if patient is a smoker (Optional):845105}    Counseling Resources:  ATP IV Guidelines  Pooled Cohorts Equation Calculator  FRAX Risk Assessment  ICSI Preventive Guidelines  Dietary Guidelines for Americans, 2010  USDA's MyPlate  ASA Prophylaxis  Lung CA Screening    Layla Murillo MD  [facility]  "

## 2019-06-01 ASSESSMENT — ANXIETY QUESTIONNAIRES: GAD7 TOTAL SCORE: 0

## 2019-06-02 PROCEDURE — 82274 ASSAY TEST FOR BLOOD FECAL: CPT | Performed by: INTERNAL MEDICINE

## 2019-06-04 ENCOUNTER — TRANSFERRED RECORDS (OUTPATIENT)
Dept: HEALTH INFORMATION MANAGEMENT | Facility: CLINIC | Age: 59
End: 2019-06-04

## 2019-06-04 DIAGNOSIS — Z12.11 COLON CANCER SCREENING: ICD-10-CM

## 2019-06-04 LAB — HEMOCCULT STL QL IA: NEGATIVE

## 2019-06-06 ENCOUNTER — NURSE TRIAGE (OUTPATIENT)
Dept: FAMILY MEDICINE | Facility: CLINIC | Age: 59
End: 2019-06-06

## 2019-06-06 NOTE — TELEPHONE ENCOUNTER
Patient states he had a panic attack at work today.   Lasted about 1/2 hour  Patient is currently on Lexapro.  Called his therapist and has appointment with therapist tomorrow (moved up from a Saturday appointment)  Patient state when panic attack hit, he googled panic attacks and tried deep breathing technique and it helped.  He then called and spke to his therapist and an appointment was scheduled for tomorrow.  Patient believes the panic attack today was triggered by thoughts that his wife might truly leave him  Have been having marital difficulties for a while.  Patient feels he is safe  Denies SI/HI  Has number for [facility] and was informed of the [facility] at [facility].  Patient generally uses [facility] and was told this is also an option if he were to have another attack.  Will plan to keep appointment with therapist tomorrow.  Karishma Bryant RN

## 2019-06-06 NOTE — TELEPHONE ENCOUNTER
Additional Information    Negative: Severe difficulty breathing (e.g., struggling for each breath, speaks in single words)    Negative: Bluish (or gray) lips or face    Negative: Difficult to awaken or acting confused  (e.g., disoriented, slurred speech)    Negative: Hysterical or combative behavior    Negative: Sounds like a life-threatening emergency to the triager    Negative: Chest pain    Negative: Palpitations, skipped heart beat, or rapid heart beat    Negative: Cough is main symptom    Negative: Suicide thoughts, threats, attempts, or questions    Negative: Depression is main problem or symptom (e.g., feelings of sadness or hopelessness)    Negative: Difficulty breathing and persists > 10 minutes and not relieved by reassurance provided by triager    Negative: Lightheadedness or dizziness and persists > 10 minutes and not relieved by reassurance provided by triager    Negative: Alcohol or drug abuse, known or suspected, and feeling very shaky (i.e., visible tremors of hands)    Negative: Patient sounds very sick or weak to the triager    Negative: Patient sounds very upset or troubled to the triager    Negative: Symptoms interfere with work or school    Negative: Symptoms of anxiety or panic and has not been evaluated for this by physician    Negative: Started on anti-anxiety medication and no relief    Negative: Significant weight loss (or gain) and not dieting    Negative: Taking thyroid medications    Negative: Known or suspected caffeine abuse (e.g., > 2 cups of coffee/tea or > 4 cans of soda / day)    Negative: Alcohol or drug abuse, known or suspected    Negative: Taking herbal remedies    Negative: Recent traumatic event (e.g., death of a loved one, job loss, victim/witness of crime)    Negative: Requesting to talk to a counselor (e.g., mental health worker, psychiatrist)    Negative: Patient wants to be seen    Negative: Symptoms interfere with sleep    Negative: Symptoms of anxiety or panic  "attack and is a chronic symptom (recurrent or ongoing AND present > 4 weeks)    Normal anxiety    Referral phone numbers for anxiety, questions about    Answer Assessment - Initial Assessment Questions  1. CONCERN: \"What happened that made you call today?\"      Panic attack lasting 1/2 hour and therapist told him to call  2. ANXIETY SYMPTOM SCREENING: \"Can you describe how you have been feeling?\"  (e.g., tense, restless, panicky, anxious, keyed up, trouble sleeping, trouble concentrating)      Heart palpitations, sweaty, nauseous  3. ONSET: \"How long have you been feeling this way?\"      1/2 hour duration, OK now  4. RECURRENT: \"Have you felt this way before?\"  If yes: \"What happened that time?\" \"What helped these feelings go away in the past?\"       Yes, last actual panic attaCK 165 YEARS AGO  5. RISK OF HARM - SUICIDAL IDEATION:  \"Do you ever have thoughts of hurting or killing yourself?\"  (e.g., yes, no, no but preoccupation with thoughts about death)    - INTENT:  \"Do you have thoughts of hurting or killing yourself right NOW?\" (e.g., yes, no, N/A)    - PLAN: \"Do you have a specific plan for how you would do this?\" (e.g., gun, knife, overdose, no plan, N/A)      NO  6. RISK OF HARM - HOMICIDAL IDEATION:  \"Do you ever have thoughts of hurting or killing someone else?\"  (e.g., yes, no, no but preoccupation with thoughts about death)    - INTENT:  \"Do you have thoughts of hurting or killing someone right NOW?\" (e.g., yes, no, N/A)    - PLAN: \"Do you have a specific plan for how you would do this?\" (e.g., gun, knife, no plan, N/A)       NO  7. FUNCTIONAL IMPAIRMENT: \"How have things been going for you overall in your life? Have you had any more difficulties than usual doing your normal daily activities?\"  (e.g., better, same, worse; self-care, school, work, interactions)      NO, SEEING A THERAPIST  8. SUPPORT: \"Who is with you now?\" \"Who do you live with?\" \"Do you have family or friends nearby who you can talk to?\" " "      AT WORK  9. THERAPIST: \"Do you have a counselor or therapist? Name?\"      YES  10. STRESSORS: \"Has there been any new stress or recent changes in your life?\"        Marital difficulties and concerned that his wife may leave him  11. CAFFEINE ABUSE: \"Do you drink caffeinated beverages, and how much each day?\" (e.g., coffee, tea, francisco)          12. SUBSTANCE ABUSE: \"Do you use any illegal drugs or alcohol?\"          13. OTHER SYMPTOMS: \"Do you have any other physical symptoms right now?\" (e.g., chest pain, palpitations, difficulty breathing, fever)        Palpitations have cleared  14. PREGNANCY: \"Is there any chance you are pregnant?\" \"When was your last menstrual period?\"        no    Protocols used: ANXIETY AND PANIC ATTACK-A-OH      "

## 2019-06-09 ENCOUNTER — TELEPHONE (OUTPATIENT)
Dept: FAMILY MEDICINE | Facility: CLINIC | Age: 59
End: 2019-06-09

## 2019-06-10 NOTE — TELEPHONE ENCOUNTER
Please call and check with patient if he will be establishing with Dr. Layla Murillo as his PCP so that I can update my patient panel.

## 2019-06-10 NOTE — TELEPHONE ENCOUNTER
Per Office Visit note on 04/25/19, patient has established care with Dr. Layla Murillo.    Emanuel Augustine, MATTHEW on 6/10/2019 at 9:58 AM

## 2019-06-13 ENCOUNTER — OFFICE VISIT (OUTPATIENT)
Dept: CARDIOLOGY | Facility: CLINIC | Age: 59
End: 2019-06-13
Payer: COMMERCIAL

## 2019-06-13 VITALS
DIASTOLIC BLOOD PRESSURE: 72 MMHG | OXYGEN SATURATION: 90 % | WEIGHT: 315 LBS | HEART RATE: 72 BPM | SYSTOLIC BLOOD PRESSURE: 144 MMHG | BODY MASS INDEX: 41.53 KG/M2

## 2019-06-13 DIAGNOSIS — I25.10 CORONARY ARTERY CALCIFICATION SEEN ON COMPUTED TOMOGRAPHY: Primary | ICD-10-CM

## 2019-06-13 DIAGNOSIS — E66.01 MORBID OBESITY (H): Chronic | ICD-10-CM

## 2019-06-13 DIAGNOSIS — E78.2 MIXED HYPERLIPIDEMIA: ICD-10-CM

## 2019-06-13 DIAGNOSIS — I10 ESSENTIAL HYPERTENSION: Chronic | ICD-10-CM

## 2019-06-13 DIAGNOSIS — E11.21 TYPE 2 DIABETES MELLITUS WITH DIABETIC NEPHROPATHY, WITHOUT LONG-TERM CURRENT USE OF INSULIN (H): Chronic | ICD-10-CM

## 2019-06-13 PROCEDURE — 99205 OFFICE O/P NEW HI 60 MIN: CPT | Mod: 25 | Performed by: INTERNAL MEDICINE

## 2019-06-13 PROCEDURE — 93000 ELECTROCARDIOGRAM COMPLETE: CPT | Performed by: INTERNAL MEDICINE

## 2019-06-13 RX ORDER — LISINOPRIL 20 MG/1
20 TABLET ORAL DAILY
Qty: 90 TABLET | Refills: 3 | Status: SHIPPED | OUTPATIENT
Start: 2019-08-13 | End: 2019-08-06

## 2019-06-13 RX ORDER — ATORVASTATIN CALCIUM 80 MG/1
80 TABLET, FILM COATED ORAL DAILY
Qty: 90 TABLET | Refills: 3 | Status: SHIPPED | OUTPATIENT
Start: 2019-06-13 | End: 2020-05-26

## 2019-06-13 NOTE — PATIENT INSTRUCTIONS
You were seen today in the Cardiovascular Clinic at [facility].     Cardiology Providers you saw during your visit: Dr. Xi Sotomayor    Diagnosis:   Hypertension, high cholesterol    Results: discussed with patient      Orders:   none    Medication Changes:   Increase atorvastatin to 80 at night  Increase lisinopril to 20 once a day    Recommendations:   Fasting lipids in 3 months    Follow-up:    As needed    Please follow up with primary care provider for medication refills     Please feel free to call me with any questions or concerns.        Questions and [phone]      For Radiology / Imaging [phone]       On Call Cardiologist for after hours or on weekends: [phone]   option #4 and ask to speak to the on-call Cardiologist.          If you need a medication refill please contact your pharmacy.  Please allow 3 business days for your refill to be completed.

## 2019-06-13 NOTE — PROGRESS NOTES
I am delighted to see Peterson Vazquez in consultation for coronary calcification seen on chest CT.      History of Present Illness:  As you know, the patient is a 58 year old  Male who saw PCP a few weeks ago with cough, shortness of breath, O2 saturation was 90%. He has h/o DVT. A chest CT PE was ordered which was negative for PE, but moderate coronary calcification was noted.    He is an  and is very sedentary. Trying to increase his daily exercise, just joined a gym. Able to do everything in the house, including stairs, cleaning gutters, without chest pain, dizziness, dyspnea. He continues to have a cough with clear sputum, had temp 99 at PCP office last week, thought to be viral URI.    The following portions of the patient's history were reviewed and updated as appropriate: allergies, current medications, past family history, past medical history, past social history, past surgical history, and the problem list.    Past Medical History:  Diabetes type II  Hypertension  Hyperlipidemia  [place], CPAP  Chronic lymphedmia  DVT right 2016 on apixaban x 3 months  Hernia repair    Medications:   Aspirin 81 every day  Atorvastatin 40 every day  llisinopril 10 every day  Metformin 1000 bid  Vitamins  lexapro  Claritin  Magnesium    Allergies:    Allergies   Allergen Reactions     Fluoxetine Hcl      Prozac: anxiety         Family History: father CABG 70s  Brother CHF  50s    Family History   Problem Relation Age of Onset     Cancer Mother         CA brain, lung, starte din tear duct,  age 52     Other Cancer Mother      Cardiovascular Father         CABG @ 74, periph. vas. disease     Alzheimer Disease Father      Chronic Obstructive Pulmonary Disease Father      Other - See Comments Father         GBS     Depression Father      Cardiovascular Brother         MI, chf dx 40,  mi age 50      Obesity Brother      Depression Other      Cancer - colorectal No family hx of        Psychosocial history:   "reports that he has never smoked. He has never used smokeless tobacco. He reports that he drinks alcohol. He reports that he does not use drugs.    Review of systems:   Cardiovascular: No palpitations, chest pain, shortness of breath at rest, dyspnea with exertion, orthopnea, paroxysmal nocturia dyspnea, nocturia, dizziness, syncope.    In addition,   Constitutional: No change in weight, sleep or appetite.  Normal energy.  No fever or chills  Eyes: Negative for vision changes or eye problems  ENT: No problems with ears, nose or throat.  No difficulty swallowing.  Resp: No coughing, wheezing or shortness of breath  GI: No nausea, vomiting,  heartburn, abdominal pain, diarrhea, constipation or change in bowel habits  : No urinary frequency or dysuria, bladder or kidney problems  Musculoskeletal: No significant muscle or joint pains  Neurologic: No headaches, numbness, tingling, weakness, problems with balance or coordination  Psychiatric: No problems with anxiety, depression or mental health  Heme/immune/allergy: No history of bleeding or clotting problems or anemia.  No allergies or immune system problems  Integumentary: No rashes,worrisome lesions or skin problems      Physical examination  Vitals: 144/72, HR 72 bpm, O2 sat 90%  BMI= 40 (6'1\", 323 lbs)    Constitutional: In general, the patient is a pleasant male in no apparent distress.    Eyes: PERRLA.  EOMI.  Sclerae white, not injected.  ENT/mouth: Normiocephalic and atraumatic.  Nares clear.  Pharynx without erythema or exudate.  Dentition intact.  No adenopathy.  No thyromegaly. Carotids +2/2 bilaterally without bruits.  No jugular venous distension.   Card/Vasc: The PMI is in the 5th ICS in the midclavicular line. There is no heave. Regular rate and rhythm. Normal S1, S2. No murmur, rub, click, or gallop. Pulses are normal bilaterally throughout. 3+peripheral edema.  Respiratory: Clear to asculation.  No ronchi, wheezes, rales.  No dullness to percussion. "   GI: Abdomen is soft, nontender, nondistended. No organomegaly. No AAA.  No bruits.   Integument: No significant bruises or rashes  Neurological: The neurological examination reveal a patient who was oriented to person, place, and time.    Psych: Normal  Heme/Lymph/Immun: no significant adenopathy    I have reviewed the following labs/imaging:  Labs 5/31/19: cholesterol 170, HDL 34,  (fasting), K 4.2, cr 0.85  5/31/19: A1C 5.5  4/25/19: TSH 1.08  3/4/19: hgb 13, plt 204  Echo 7/17/06: normal  Chest CT PE 5/31/19: no PE; moderate coronary calcification noted  LE dopplers 1/18/19: no DVT    I have personally and independently reviewed the following:  EKG 6/13/19: sinus rhythm, PACs, LVH    Assessment :  1. Moderate coronary calcification incidentally reported on CT PE. Nor surprising given his risk factors. No symptoms. Focus on risk factor modification.  2. Hyperlipidemia. Triglycerides were > 550 on atorvastatin 40, and it was a fasting sample. No symptoms of pancreatitis  3. Hypertension. Does not check his BP at home. Could be better controlled  4. Diabetes type II. A1C decreased from 7.5 to 5.5 with an increase in exericse  5. Obesity. All of the above will likely improve with weight loss        Plan:  1. Increase atorvastatin to 80 every day  2. Increase lisinopril to 20 every day  3. Fasting lipids in 3 months  4. Encourage activity and weight loss      I spent a total of 30 minutes face to face with  Peterson Vazquez during today's office visit. Over 50% of this time was spent counseling the patient and/or coordinating care regarding lifestyle modifications.      The patient is to return as needed. Follow up with PCP. The patient understood the treatment plan as outlined above.  There were no barriers to learning.      Xi Sotomayor MD

## 2019-06-13 NOTE — LETTER
2019      RE: Peterson Vazquez  [address]  [facility] [address]       Dear Colleague,    Thank you for the opportunity to participate in the care of your patient, Peterson Vazquez, at the [facility]. Please see a copy of my visit note below.    I am delighted to see Peterson Vazquez in consultation for coronary calcification seen on chest CT.      History of Present Illness:  As you know, the patient is a 58 year old  Male who saw PCP a few weeks ago with cough, shortness of breath, O2 saturation was 90%. He has h/o DVT. A chest CT PE was ordered which was negative for PE, but moderate coronary calcification was noted.    He is an  and is very sedentary. Trying to increase his daily exercise, just joined a gym. Able to do everything in the house, including stairs, cleaning gutters, without chest pain, dizziness, dyspnea. He continues to have a cough with clear sputum, had temp 99 at PCP office last week, thought to be viral URI.    The following portions of the patient's history were reviewed and updated as appropriate: allergies, current medications, past family history, past medical history, past social history, past surgical history, and the problem list.    Past Medical History:  Diabetes type II  Hypertension  Hyperlipidemia  [place], CPAP  Chronic lymphedmia  DVT right  on apixaban x 3 months  Hernia repair    Medications:   Aspirin 81 every day  Atorvastatin 40 every day  llisinopril 10 every day  Metformin 1000 bid  Vitamins  lexapro  Claritin  Magnesium    Allergies:    Allergies   Allergen Reactions     Fluoxetine Hcl      Prozac: anxiety         Family History: father CABG 70s  Brother CHF  50s    Family History   Problem Relation Age of Onset     Cancer Mother         CA brain, lung, starte din tear duct,  age 52     Other Cancer Mother      Cardiovascular Father         CABG  "@ 74, [facility]. vas. disease     Alzheimer Disease Father      Chronic Obstructive Pulmonary Disease Father      Other - See Comments Father         GBS     Depression Father      Cardiovascular Brother         MI, chf dx 40,  mi age 50      Obesity Brother      Depression Other      Cancer - colorectal No family hx of        Psychosocial history:  reports that he has never smoked. He has never used smokeless tobacco. He reports that he drinks alcohol. He reports that he does not use drugs.    Review of systems:   Cardiovascular: No palpitations, chest pain, shortness of breath at rest, dyspnea with exertion, orthopnea, paroxysmal nocturia dyspnea, nocturia, dizziness, syncope.    In addition,   Constitutional: No change in weight, sleep or appetite.  Normal energy.  No fever or chills  Eyes: Negative for vision changes or eye problems  ENT: No problems with ears, nose or throat.  No difficulty swallowing.  Resp: No coughing, wheezing or shortness of breath  GI: No nausea, vomiting,  heartburn, abdominal pain, diarrhea, constipation or change in bowel habits  : No urinary frequency or dysuria, bladder or kidney problems  Musculoskeletal: No significant muscle or joint pains  Neurologic: No headaches, numbness, tingling, weakness, problems with balance or coordination  Psychiatric: No problems with anxiety, depression or mental health  Heme/immune/allergy: No history of bleeding or clotting problems or anemia.  No allergies or immune system problems  Integumentary: No rashes,worrisome lesions or skin problems      Physical examination  Vitals: 144/72, HR 72 bpm, O2 sat 90%  BMI= 40 (6'1\", 323 lbs)    Constitutional: In general, the patient is a pleasant male in no apparent distress.    Eyes: PERRLA.  EOMI.  Sclerae white, not injected.  ENT/mouth: Normiocephalic and atraumatic.  Nares clear.  Pharynx without erythema or exudate.  Dentition intact.  No adenopathy.  No thyromegaly. Carotids +2/2 bilaterally without " bruits.  No jugular venous distension.   Card/Vasc: The PMI is in the 5th ICS in the midclavicular line. There is no heave. Regular rate and rhythm. Normal S1, S2. No murmur, rub, click, or gallop. Pulses are normal bilaterally throughout. 3+peripheral edema.  Respiratory: Clear to asculation.  No ronchi, wheezes, rales.  No dullness to percussion.   GI: Abdomen is soft, nontender, nondistended. No organomegaly. No AAA.  No bruits.   Integument: No significant bruises or rashes  Neurological: The neurological examination reveal a patient who was oriented to person, place, and time.    Psych: Normal  Heme/Lymph/Immun: no significant adenopathy    I have reviewed the following labs/imaging:  Labs 5/31/19: cholesterol 170, HDL 34,  (fasting), K 4.2, cr 0.85  5/31/19: A1C 5.5  4/25/19: TSH 1.08  3/4/19: hgb 13, plt 204  Echo 7/17/06: normal  Chest CT PE 5/31/19: no PE; moderate coronary calcification noted  LE dopplers 1/18/19: no DVT    I have personally and independently reviewed the following:  EKG 6/13/19: sinus rhythm, PACs, LVH    Assessment :  1. Moderate coronary calcification incidentally reported on CT PE. Nor surprising given his risk factors. No symptoms. Focus on risk factor modification.  2. Hyperlipidemia. Triglycerides were > 550 on atorvastatin 40, and it was a fasting sample. No symptoms of pancreatitis  3. Hypertension. Does not check his BP at home. Could be better controlled  4. Diabetes type II. A1C decreased from 7.5 to 5.5 with an increase in exericse  5. Obesity. All of the above will likely improve with weight loss        Plan:  1. Increase atorvastatin to 80 every day  2. Increase lisinopril to 20 every day  3. Fasting lipids in 3 months  4. Encourage activity and weight loss      I spent a total of 30 minutes face to face with  Peterson Vazquez during today's office visit. Over 50% of this time was spent counseling the patient and/or coordinating care regarding lifestyle  modifications.      The patient is to return as needed. Follow up with PCP. The patient understood the treatment plan as outlined above.  There were no barriers to learning.      Xi Sotomayor MD

## 2019-07-31 DIAGNOSIS — F33.2 SEVERE EPISODE OF RECURRENT MAJOR DEPRESSIVE DISORDER, WITHOUT PSYCHOTIC FEATURES (H): ICD-10-CM

## 2019-07-31 DIAGNOSIS — F41.1 GAD (GENERALIZED ANXIETY DISORDER): ICD-10-CM

## 2019-08-01 NOTE — TELEPHONE ENCOUNTER
"Requested Prescriptions   Pending Prescriptions Disp Refills     escitalopram (LEXAPRO) 10 MG tablet [Pharmacy Med Name: ESCITALOPRAM TABS 10MG] 90 tablet 4     Sig: TAKE 1 TABLET DAILY (FURTHER RECOMMENDATIONS BY PSYCHE)  Last Written Prescription Date:  5/9/2019  Last Fill Quantity: 90 tablet,  # refills: 0   Last Office Visit: 5/31/2019   Future Office Visit:    Next 5 appointments (look out 90 days)    Aug 06, 2019  8:40 AM CDT  Office Visit with Layla Murillo MD  [facility] ([facility]) [address] [place] [id number] [phone]              SSRIs Protocol Passed - 7/31/2019 11:09 PM        Passed - PHQ-9 score less than 5 in past 6 months     Please review last PHQ-9 score.   PHQ-9 SCORE 4/25/2019 5/9/2019 5/31/2019   PHQ-9 Total Score MyChart - 7 (Mild depression) -   PHQ-9 Total Score 21 7 0     NETTIE-7 SCORE 4/25/2019 5/9/2019 5/31/2019   Total Score - 4 (minimal anxiety) -   Total Score 15 4 0           Passed - Medication is active on med list        Passed - Patient is age 18 or older        Passed - Recent (6 mo) or future (30 days) visit within the authorizing provider's specialty     Patient had office visit in the last 6 months or has a visit in the next 30 days with authorizing provider or within the authorizing provider's specialty.  See \"Patient Info\" tab in inbasket, or \"Choose Columns\" in Meds & Orders section of the refill encounter.              "

## 2019-08-05 RX ORDER — ESCITALOPRAM OXALATE 10 MG/1
TABLET ORAL
Qty: 90 TABLET | Refills: 0 | Status: SHIPPED | OUTPATIENT
Start: 2019-08-05

## 2019-08-05 NOTE — TELEPHONE ENCOUNTER
Prescription approved per [facility] Refill Protocol.  Charlette Gomez RN

## 2019-08-06 ENCOUNTER — OFFICE VISIT (OUTPATIENT)
Dept: FAMILY MEDICINE | Facility: CLINIC | Age: 59
End: 2019-08-06
Payer: COMMERCIAL

## 2019-08-06 VITALS
WEIGHT: 306.25 LBS | TEMPERATURE: 98.6 F | RESPIRATION RATE: 17 BRPM | HEART RATE: 71 BPM | SYSTOLIC BLOOD PRESSURE: 108 MMHG | BODY MASS INDEX: 39.32 KG/M2 | OXYGEN SATURATION: 90 % | DIASTOLIC BLOOD PRESSURE: 60 MMHG

## 2019-08-06 DIAGNOSIS — Z23 NEED FOR DIPHTHERIA-TETANUS-PERTUSSIS (TDAP) VACCINE: ICD-10-CM

## 2019-08-06 DIAGNOSIS — Z12.5 SCREENING PSA (PROSTATE SPECIFIC ANTIGEN): ICD-10-CM

## 2019-08-06 DIAGNOSIS — E11.21 TYPE 2 DIABETES MELLITUS WITH DIABETIC NEPHROPATHY, WITHOUT LONG-TERM CURRENT USE OF INSULIN (H): Chronic | ICD-10-CM

## 2019-08-06 DIAGNOSIS — I25.10 CORONARY ARTERY CALCIFICATION SEEN ON CAT SCAN: ICD-10-CM

## 2019-08-06 DIAGNOSIS — Z23 NEED FOR SHINGLES VACCINE: ICD-10-CM

## 2019-08-06 DIAGNOSIS — I10 ESSENTIAL HYPERTENSION: Primary | Chronic | ICD-10-CM

## 2019-08-06 DIAGNOSIS — G47.33 OSA (OBSTRUCTIVE SLEEP APNEA): Chronic | ICD-10-CM

## 2019-08-06 DIAGNOSIS — E78.5 HYPERLIPIDEMIA, UNSPECIFIED HYPERLIPIDEMIA TYPE: Chronic | ICD-10-CM

## 2019-08-06 DIAGNOSIS — E66.01 MORBID OBESITY (H): Chronic | ICD-10-CM

## 2019-08-06 DIAGNOSIS — F33.2 SEVERE EPISODE OF RECURRENT MAJOR DEPRESSIVE DISORDER, WITHOUT PSYCHOTIC FEATURES (H): ICD-10-CM

## 2019-08-06 LAB
ALBUMIN SERPL-MCNC: 3.9 G/DL (ref 3.4–5)
ALP SERPL-CCNC: 87 U/L (ref 40–150)
ALT SERPL W P-5'-P-CCNC: 33 U/L (ref 0–70)
ANION GAP SERPL CALCULATED.3IONS-SCNC: 7 MMOL/L (ref 3–14)
AST SERPL W P-5'-P-CCNC: 17 U/L (ref 0–45)
BILIRUB SERPL-MCNC: 1.7 MG/DL (ref 0.2–1.3)
BUN SERPL-MCNC: 24 MG/DL (ref 7–30)
CALCIUM SERPL-MCNC: 9.2 MG/DL (ref 8.5–10.1)
CHLORIDE SERPL-SCNC: 111 MMOL/L (ref 94–109)
CHOLEST SERPL-MCNC: 117 MG/DL
CO2 SERPL-SCNC: 23 MMOL/L (ref 20–32)
CREAT SERPL-MCNC: 0.98 MG/DL (ref 0.66–1.25)
GFR SERPL CREATININE-BSD FRML MDRD: 84 ML/MIN/{1.73_M2}
GLUCOSE SERPL-MCNC: 130 MG/DL (ref 70–99)
HDLC SERPL-MCNC: 36 MG/DL
LDLC SERPL CALC-MCNC: 60 MG/DL
NONHDLC SERPL-MCNC: 81 MG/DL
POTASSIUM SERPL-SCNC: 5.2 MMOL/L (ref 3.4–5.3)
PROT SERPL-MCNC: 7.1 G/DL (ref 6.8–8.8)
PSA SERPL-ACNC: 1.26 UG/L (ref 0–4)
SODIUM SERPL-SCNC: 141 MMOL/L (ref 133–144)
TRIGL SERPL-MCNC: 103 MG/DL

## 2019-08-06 PROCEDURE — 80053 COMPREHEN METABOLIC PANEL: CPT | Performed by: FAMILY MEDICINE

## 2019-08-06 PROCEDURE — 80061 LIPID PANEL: CPT | Performed by: FAMILY MEDICINE

## 2019-08-06 PROCEDURE — 99214 OFFICE O/P EST MOD 30 MIN: CPT | Performed by: FAMILY MEDICINE

## 2019-08-06 PROCEDURE — 36415 COLL VENOUS BLD VENIPUNCTURE: CPT | Performed by: FAMILY MEDICINE

## 2019-08-06 PROCEDURE — G0103 PSA SCREENING: HCPCS | Performed by: FAMILY MEDICINE

## 2019-08-06 RX ORDER — LISINOPRIL 10 MG/1
15 TABLET ORAL DAILY
COMMUNITY
Start: 2019-08-13 | End: 2019-11-08

## 2019-08-06 ASSESSMENT — PATIENT HEALTH QUESTIONNAIRE - PHQ9
SUM OF ALL RESPONSES TO PHQ QUESTIONS 1-9: 1
SUM OF ALL RESPONSES TO PHQ QUESTIONS 1-9: 1
10. IF YOU CHECKED OFF ANY PROBLEMS, HOW DIFFICULT HAVE THESE PROBLEMS MADE IT FOR YOU TO DO YOUR WORK, TAKE CARE OF THINGS AT HOME, OR GET ALONG WITH OTHER PEOPLE: NOT DIFFICULT AT ALL

## 2019-08-06 ASSESSMENT — ANXIETY QUESTIONNAIRES
4. TROUBLE RELAXING: NOT AT ALL
GAD7 TOTAL SCORE: 1
5. BEING SO RESTLESS THAT IT IS HARD TO SIT STILL: NOT AT ALL
1. FEELING NERVOUS, ANXIOUS, OR ON EDGE: NOT AT ALL
GAD7 TOTAL SCORE: 1
7. FEELING AFRAID AS IF SOMETHING AWFUL MIGHT HAPPEN: NOT AT ALL
GAD7 TOTAL SCORE: 1
2. NOT BEING ABLE TO STOP OR CONTROL WORRYING: NOT AT ALL
3. WORRYING TOO MUCH ABOUT DIFFERENT THINGS: NOT AT ALL
7. FEELING AFRAID AS IF SOMETHING AWFUL MIGHT HAPPEN: NOT AT ALL
6. BECOMING EASILY ANNOYED OR IRRITABLE: SEVERAL DAYS

## 2019-08-06 NOTE — RESULT ENCOUNTER NOTE
Rosey Mr. Vazquez,  Your results came back and are within acceptable limits. -PSA (prostate specific antigen) test is normal.  This indicates a low likelihood of prostate cancer.  ADVISE: rechecking this in 1 year.. If you have any further concerns please do not hesitate to contact us by message, phone or making an appointment.  Have a good day   Dr Chidi MCKEE

## 2019-08-06 NOTE — PATIENT INSTRUCTIONS
Decrease lisinopril to 15 mg daily  Has enough so not sent in  Monitor BP at home   Call if more than 140/90 or less than 90/ 60 if symptomatic  labs today   Make an eye check  Consider tdap and shningrex  See back in nov for DM  continue care with psyche ( will take over lexapro prescribing), let us know name of new med and dosing  continue care with sleep, cardiology as needed, derm, derm and therapist  Topical abx to upper lip  continue cpap  Saline gel in nose for allergies

## 2019-08-06 NOTE — PROGRESS NOTES
Subjective     Peterson Vazquez is a 58 year old male who presents to clinic today for the following health issues:    HPI   Hyperlipidemia Follow-Up    Are you having any of the following symptoms? (Select all that apply)  No complaints of shortness of breath, chest pain or pressure.  No increased sweating or nausea with activity.  No left-sided neck or arm pain.  No complaints of pain in calves when walking 1-2 blocks. - a little tightness on calves when walking     Are you regularly taking any medication or supplement to lower your cholesterol?   Yes- stain and fish oil    Are you having muscle aches or other side effects that you think could be caused by your cholesterol lowering medication?  No    Hypertension Follow-up    Do you check your blood pressure regularly outside of the clinic? No     Are you following a low salt diet? Yes    Are your blood pressures ever more than 140 on the top number (systolic) OR more   than 90 on the bottom number (diastolic), for example 140/90? No - pt do not know    Amount of exercise or physical activity: 2-3 days/week for an average of 15-30 minutes    Problems taking medications regularly: No    Medication side effects: lightheadedness- BP meds    Diet: low salt and slim genics \  Answers for HPI/ROS submitted by the patient on 8/6/2019   If you checked off any problems, how difficult have these problems made it for you to do your work, take care of things at home, or get along with other people?: Not difficult at all  PHQ9 TOTAL SCORE: 1  NETTIE 7 TOTAL SCORE: 1    BACKGROUND  Hx of morbid obesity, DM on asa & metformin, DM nephropathy DM polyneuropathy, HLD on Lipitor, HTN on lisinopril, AMERICA on CPAP, chronic acquired lymphedema on compression socks, hx of prior DVT right peroneal proximal calf in 2016 on eliquis then 3 months as secondary to cellulitis at that time. Seen by hematology & recheck u/S after 3 months anticoagulation showed no DVT & advised no long term  anticoagulation at that time.   [place] first by Dr Duffy [place] 1[place] [facility] [place]. On 2/2003 noted bad wave of anxiety , felt wanted to be dead & stopped Prozac & felt better. a 3/11/03 note stated He was seen on 10-3-02 with symptoms of depression and was started on Prozac 10 mg daily. Very quickly he felt anxious and at one point felt was walking through a tunnel and felt as though someone was stalking him. He realized this was not true. At another time he felt almost suicidal. He believed both of these events were related to the Prozac and stopped the med. Since then he felt definitely less anxious though still depressed. The multiple stressors with which he presented in Oct. had escalated. He and his then wife had both been in counseling and had since , planning a divorce. At that point was started on Lexapro 10 mg & was also being evaluated for recurrent abdominal pain. Continued on Lexapro 10 mg all through 2004 & 2005. No mention of depression med in notes until 2007 note when mentioned to continue citalopram ( ? Typo) & was moving to [place].   Next seen 8/2010 at [facility] by nickie mota for a physical & noted then on tetracycline for rosacea & no longer noted to be on any antidepressant. Was just on a statin. In 2011 had an umbilical hernia surgery. In nov 2012 returned to dr Duffy for back pain & 1/30/13 reestablished care with dr Duffy. TCN no longer made/ covered & given Metrogel then. Noted also had a girlfriend at the time. In 9/2013 referred to sleep for snoring & fatigue. Seen by sleep Dr Montaño in 10/2013 & soon after that started on a CPAP. On 3/19/14 started on minocycline for rosacea. On 5/2015 seen by Dr Du for a physical & then seen 12/2015 transfer care to Dr Pradhan who was also primary for  his dad (brought in from [place] who had dementia & second wife was his primary care giver). On metformin by then. Noted care giver stress. In er 5/2/16  for kidney stones. In er admitted 6/12 to 6/14 for cellulitis right leg & associated DVT right peroneal vein. Seen for a physical 3/2017 by dr garcia. Seen by hematology Dr Chowdhury 3/2017, u/S was negative 4/2017 & eliquis discontinued as had taken for about 9 months by then. In er 6/2017 for kidney stones. Seen by [facility] for his sleep concerns,   Hx of most recent cellulitis was left lower leg in 1/2019, with prior strep & staph, hx of rosacea on minocycline in the past & on Metrogel, chronic rhinitis on Claritin, hx of prior kidney stone noted stones in bladder & left kidney an ct 2017, prior tooth extraction, tonsillectomy, on vit d, B 12, magnesium, turmeric, intolerant to fluoxetine, normal stress test in 2011, seen by prior PCP Dr Pradhan 4/26/18 for a physical & noted then was on a keto diet. Admitted then 1/18 to 1/23/19 for cellulitis secondary O a laceration in left foot sole. U/S negative for DVT, Cx grew staph  GBS strep, evaluated by ID, HBA1c was 6.7 at the time, given vanc & zosyn then ceftriaxone then discharged on Augmentin & lymphedema treatment. Seen by Dr Woods 1/28 hospital follow up & doxycycline given. On 2/13 given keflex on patients insistence that cellulitis still there, had mostly venous stasis dermatitis. Seen 3/4 & no infection sen. given Metrogel for rosacea, LDL was elevated at 99 & cbc was normal. Did lymphedema treatment with good results & discharged wearing compressions socks from OT on 3/19  Seen 4/25 first time by this writer as PCP on maternity leave. Had severe depression, NETTIE & addressed dm, polyneuropathy, Morbid obesity, HTN , AMERICA ^ HLD. Lymphedema & venous stasis dermatitis & discoloration were under control with use of compression socks. Had no recurrence of prior DVT or kidney stones & discussed chronic abx use itz minocycline may not be wise specially given current low mood & referred to dermatology. Was restarted on Lexapro, referred to psyche & day treatment & continued  with his psychologist. Labs done later showed elevated microalbumin, normal CMP, TSH, cbc, with mildly elevated HBA1c. Declined colonoscopy, was to do a FIT test & deferred Tdap & shingrex to another day.   Seen 5/9/19 for a physical & was feeling 75 % better. When started the Lexapro had one day of lethargy, was functioning and coping better. he plans to do more exercise & has joined the [employer] & has got a . He'd been going to therapy & felt competent and more stable, was seeing his therapist once a week, been a couple times since started  To see psyche 5/30 & was on the wait list for 55 +day treatment at [facility]. Thought felt would go through his own therapist Dr Elham Najera, as  concerned about the time commitment & difficulty with work, & employability. Was to see dermatology too at [facility].  exam declined. Had a  low sat and cough but cxr was normal. Diabetes  & BP was good. Reminded was due for a dm eye check. Continued on CPAP. Was to consider PSA another time & Tdap and shingles when wife got back from her trip. CTA chest done due to low SATs which was negative for PE. Did show coronary calcifications for which referred to cardiology. Seen by cardiology Dr Sotomayor on 6/13 and advised to increase atorvastatin to 80 and lisinopril to 20 and advised fasting lipids in 3 months. Seen by derm on 6/17 for rosacea, SK, & dermatitis & continued on current meds.     CURRENTLY  Doing well.   BP better. Wonders if too low, dizzy when gets up, now on lisinopril 20. To get home BP machine as currently not been checking. Has more than enough lisinopril 10 mg pills from mail order from before. Been taking 20 mg total since seen by cardiology in early June.  HLd: on increased atorvastatin 80 mg bedtime since mid June. Had tightness in calf on first starting walking thinks more mechanical than side effect. Tolerating well. Was to get fasting labs 3 months from starting ( set ) but fasting today & would  like it checked today.   BMI > 39.Weight down from 323 lbs to 306 lbs since last seen. On slimgenics & plans to loose 100 lbs over the next 1 yr  Dm better has a monitor measuring/ recording sugars called maria teresa from [employer]: carb's make it go up . FS in 120's generally  coronary artery calcifications: asymptomatic. working on diet and exercise and weight loss, on ace, statin and asa   AMERICA: on CPAP: under care of sleep recently lost  on CPAP and it slammed on his left upper lip, no sign of infection has been applying OTC bacitracin to it.   MDD/ Anxiety : no longer  suicidal, better on lexapro 10 mg, now under care of psyche, who gave him mood stabilizer, ? Abilify for paranoia, his wife is back but she told him the marriage is on hold. May get a divorce. Sees psyche again next week. Also seeing a therapist regularly.   Rosacea: restarted Metrogel  Was lost in house for a while. Seen  by derm. They too didn't recommend going back on oral antibiotics.  Lymphedema:is using his Velcro wraps. To go in next week for lymphedema Rx. Applying Eucerin to  Legs. Has one scab on each leg that never goes away. Checks feet daily   Agreeable to PSA testing.  Declines again Tdap and shingrex today.    NETTIE-7   [employer], 2002; Used with Permission) 8/6/2019   1. Feeling nervous, anxious, or on edge Not at all   2. Not being able to stop or control worrying Not at all   3. Worrying too much about different things Not at all   4. Trouble relaxing Not at all   5. Being so restless that it is hard to sit still Not at all   6. Becoming easily annoyed or irritable Several days   7. Feeling afraid, as if something awful might happen Not at all   NETTIE 7 TOTAL SCORE 1 (minimal anxiety)   1. Feeling nervous, anxious, or on edge 0   2. Not being able to stop or control worrying 0   3. Worrying too much about different things 0   4. Trouble relaxing 0   5. Being so restless that it is hard to sit still 0   6. Becoming easily annoyed or  irritable 1   7. Feeling afraid, as if something awful might happen 0   NETTIE-7 Total Score 1   If you checked any problems, how difficult have they made it for you to do your work, take care of things at home, or get along with other people?    PHQ9-9 ([employer]) 8/6/2019   1.  Little interest or pleasure in doing things 0   2.  Feeling down, depressed, or hopeless 1   3.  Trouble falling or staying asleep, or sleeping too much 0   4.  Feeling tired or having little energy 0   5.  Poor appetite or overeating 0   6.  Feeling bad about yourself 0   7.  Trouble concentrating 0   8.  Moving slowly or restless 0   9.  Suicidal or self-harm thoughts 0   PHQ9-9 Total Score 1   Difficulty at work, home, or with people    1.  Little interest or pleasure in doing things Not at all   2.  Feeling down, depressed, or hopeless Several days   3.  Trouble falling or staying asleep, or sleeping too much Not at all   4.  Feeling tired or having little energy Not at all   5.  Poor appetite or overeating Not at all   6.  Feeling bad about yourself Not at all   7.  Trouble concentrating Not at all   8.  Moving slowly or restless Not at all   9.  Suicidal or self-harm thoughts Not at all   PHQ9-9 via [employer] TOTAL SCORE-----> 1 (Minimal depression)   Difficulty at work, home, or with people Not difficult at all     Patient Active Problem List   Diagnosis     Hyperlipidemia     Chronic rhinitis     Morbid obesity (H)     Rosacea     Type 2 diabetes mellitus with diabetic nephropathy; goal HgbA1c < 7%     Essential hypertension; goal < 140/90     History of nephrolithiasis     AMERICA (obstructive sleep apnea)     Diabetic polyneuropathy associated with type 2 diabetes mellitus (H)     History of deep venous thrombosis right peroneal vein provoked by cellulitis     Chronic acquired lymphedema     Venous stasis dermatitis of both lower extremities     Severe episode of recurrent major depressive disorder, without psychotic features (H)      Microalbuminuria     Coronary artery calcification seen on CAT scan     Past Surgical History:   Procedure Laterality Date     HC TOOTH EXTRACTION W/FORCEP      WISDOM TEETH     HERNIA REPAIR  2011    mesh     TONSILLECTOMY      TONSILS       Social History     Tobacco Use     Smoking status: Never Smoker     Smokeless tobacco: Never Used   Substance Use Topics     Alcohol use: Yes     Alcohol/week: 0.0 oz     Comment: 1 drink monthly     Family History   Problem Relation Age of Onset     Cancer Mother         CA brain, lung, starte din tear duct,  age 52     Other Cancer Mother      Cardiovascular Father         CABG @ 74, periph. vas. disease     Alzheimer Disease Father      Chronic Obstructive Pulmonary Disease Father      Other - See Comments Father         GBS     Depression Father      Cardiovascular Brother         MI, chf dx 40,  mi age 50      Obesity Brother      Depression Other      Cancer - colorectal No family hx of          Current Outpatient Medications   Medication Sig Dispense Refill     aspirin 81 MG EC tablet Take 1 tablet (81 mg) by mouth daily 90 tablet 3     atorvastatin (LIPITOR) 80 MG tablet Take 1 tablet (80 mg) by mouth daily 90 tablet 3     Blood Glucose Monitoring Suppl (BLOOD GLUCOSE METER) KIT 1 Device daily 1 kit 2     cholecalciferol (VITAMIN D) 1000 UNIT tablet Take 1 tablet (1,000 Units) by mouth daily 100 tablet 3     Cyanocobalamin (B-12) 1000 MCG TBCR Take 1,000 mcg by mouth daily 100 tablet 1     escitalopram (LEXAPRO) 10 MG tablet TAKE 1 TABLET DAILY (FURTHER RECOMMENDATIONS BY PSYCHE) 90 tablet 0     [START ON 2019] lisinopril (PRINIVIL/ZESTRIL) 10 MG tablet Take 1.5 tablets (15 mg) by mouth daily       loratadine (CLARITIN) 10 MG tablet Take 10 mg by mouth At Bedtime       magnesium gluconate (MAGONATE) 500 (27 Mg) MG tablet Take 500 mg by mouth At Bedtime       metFORMIN (GLUCOPHAGE-XR) 500 MG 24 hr tablet Take 2 tablets (1,000 mg) by mouth 2 times daily (with  meals) 360 tablet 1     metroNIDAZOLE (METROGEL) 1 % external gel Apply topically daily 60 g 11     Multiple Vitamin (DAILY MULTIVITAMIN PO) Take 1 tablet by mouth At Bedtime        order for DME BBK 20-30 MM COMPRESSION SOCKS/ BBK FARROW CLASSIC VELCRO STRAP BINDERS WITH FOOT PIECES AND SOCK LINERS/ HYBRID SOCK 1 each 1     triamcinolone (KENALOG) 0.1 % external cream Apply topically 3 times daily 453.6 g 1     Turmeric (RA TURMERIC) 500 MG CAPS Take 1 capsule by mouth daily       ARIPiprazole (ABILIFY) 2 MG tablet Take 1 tablet (2 mg) by mouth daily       Allergies   Allergen Reactions     Fluoxetine Hcl      Prozac: anxiety     Recent Labs   Lab Test 08/06/19  0927 05/31/19  1517 05/31/19  0830 04/25/19  0923  01/21/19  0831  04/27/18  1027  03/03/17  1012   A1C  --   --  5.5 7.5*  --  6.7*  --  6.6*  --  7.5*   LDL 60  --  Cannot estimate LDL when triglyceride exceeds 400 mg/dL  79  --    < >  --   --  77  --  87   HDL 36*  --  34*  --   --   --   --  37*  --  44   TRIG 103  --  565*  --   --   --   --  305*  --  294*   ALT 33  --  37 43  --   --    < > 48  --  47   CR 0.98  --  0.85 0.92   < > 0.90   < > 0.89   < > 0.91   GFRESTIMATED 84 >90 >90 90   < > >90   < > 88   < > 85   GFRESTBLACK >90 >90 >90 >90   < > >90   < > >90   < > >90  African American GFR Calc     POTASSIUM 5.2  --  4.2 5.1  --   --    < > 4.3   < > 5.1   TSH  --   --   --  1.08  --   --   --   --   --  1.29    < > = values in this interval not displayed.      BP Readings from Last 3 Encounters:   08/06/19 108/60   06/13/19 144/72   05/31/19 129/69    Wt Readings from Last 3 Encounters:   08/06/19 138.9 kg (306 lb 4 oz)   06/13/19 146.7 kg (323 lb 8 oz)   05/31/19 143.9 kg (317 lb 4 oz)                    Reviewed and updated as needed this visit by Provider         Review of Systems   ROS COMP: Constitutional, HEENT, cardiovascular, pulmonary, GI, , musculoskeletal, neuro, skin, endocrine and psych systems are negative, except as otherwise  noted.      Objective    /60 (BP Location: Left arm, Patient Position: Chair, Cuff Size: Adult Large)   Pulse 71   Temp 98.6  F (37  C) (Oral)   Resp 17   Wt 138.9 kg (306 lb 4 oz)   SpO2 90%   BMI 39.32 kg/m    Body mass index is 39.32 kg/m .  Physical Exam   GENERAL: healthy, alert, no distress and obese  EYES: Eyes grossly normal to inspection, PERRL and conjunctivae and sclerae normal  HENT: ear canals and TM's normal, nose and mouth without ulcers or lesions, some crust nasal discharge, left upper lip resolving bruise and swelling  NECK: no adenopathy, no asymmetry, masses, or scars and thyroid normal to palpation  RESP: lungs clear to auscultation - no rales, rhonchi or wheezes  CV: regular rate and rhythm, normal S1 S2, no S3 or S4, no murmur, click or rub, no peripheral edema and peripheral pulses strong  ABDOMEN: soft, non tender  MS: no gross musculoskeletal defects noted, no edema but wrapped in Velcro wraps.  SKIN: no suspicious lesions or rashes  NEURO: Normal strength and tone, mentation intact and speech normal  PSYCH: mentation appears normal, affect normal/bright    Diagnostic Test Results:  Labs reviewed in Epic  Results for orders placed or performed in visit on 08/06/19   Comprehensive metabolic panel   Result Value Ref Range    Sodium 141 133 - 144 mmol/L    Potassium 5.2 3.4 - 5.3 mmol/L    Chloride 111 (H) 94 - 109 mmol/L    Carbon Dioxide 23 20 - 32 mmol/L    Anion Gap 7 3 - 14 mmol/L    Glucose 130 (H) 70 - 99 mg/dL    Urea Nitrogen 24 7 - 30 mg/dL    Creatinine 0.98 0.66 - 1.25 mg/dL    GFR Estimate 84 >60 mL/min/[1.73_m2]    GFR Estimate If Black >90 >60 mL/min/[1.73_m2]    Calcium 9.2 8.5 - 10.1 mg/dL    Bilirubin Total 1.7 (H) 0.2 - 1.3 mg/dL    Albumin 3.9 3.4 - 5.0 g/dL    Protein Total 7.1 6.8 - 8.8 g/dL    Alkaline Phosphatase 87 40 - 150 U/L    ALT 33 0 - 70 U/L    AST 17 0 - 45 U/L   Lipid panel reflex to direct LDL Fasting   Result Value Ref Range    Cholesterol 117  <200 mg/dL    Triglycerides 103 <150 mg/dL    HDL Cholesterol 36 (L) >39 mg/dL    LDL Cholesterol Calculated 60 <100 mg/dL    Non HDL Cholesterol 81 <130 mg/dL   Prostate spec antigen screen   Result Value Ref Range    PSA 1.26 0 - 4 ug/L           Assessment & Plan     1. Essential hypertension; goal < 140/90  BP wnl but dizzy when stands up. Decrease lisinopril to 15 mg daily. Has enough so not sent in. To monitor BP at home & call if more than 140/90 or less than 90/ 60 if symptomatic. Labs today stable. Reminded to make a dilated eye check. Consider tdap and shingrex. See back in nov for DM. Continue care with psyche ( will take over lexapro prescribing), to let us know name of new med and dosing. To continue care with sleep, cardiology as needed, derm, psyche and therapist. Topical abx to upper lip. Continue CPAP. Saline gel in nose for allergies.  - Comprehensive metabolic panel  - lisinopril (PRINIVIL/ZESTRIL) 10 MG tablet; Take 1.5 tablets (15 mg) by mouth daily    2. Hyperlipidemia, unspecified hyperlipidemia type  Tolerating atorvastatin 80 mg bedtime. Continue same. Lipids improved. Working on diet, exercise and has lost weight. recheck in nov at next apt .  - Lipid panel reflex to direct LDL Fasting    3. Morbid obesity (H)  Doing Slim genics, already lost about 20 lbs, exercising , has a . congratulated and will continue to follow weight.    4. Type 2 diabetes mellitus with diabetic nephropathy, without long-term current use of insulin (H)  Stable metformin 1000 mg twice a day continue asa, statin and ace . Make a DM eye check. Check feet regularity. Return in nov for repeat labs.  - Comprehensive metabolic panel  - Lipid panel reflex to direct LDL Fasting    5. Coronary artery calcification seen on CAT scan  Seen by cardiology. Risk factor modification optimization with ACE, Statin, asa, weight loss, diet and exercise and DM control.    6. AMERICA (obstructive sleep apnea)  Continue use of  "CPAP under care of sleep.    7. Severe episode of recurrent major depressive disorder, without psychotic features (H)  Doing better on lexapro and now on Abilify under care of psyche and therapist.    8. Screening PSA (prostate specific antigen)  - Prostate spec antigen screen    9. Need for diphtheria-tetanus-pertussis (Tdap) vaccine  Declines today.    10. Need for shingles vaccine  Declines today.      BMI:   Estimated body mass index is 39.32 kg/m  as calculated from the following:    Height as of 5/31/19: 1.88 m (6' 2\").    Weight as of this encounter: 138.9 kg (306 lb 4 oz).   Weight management plan: Discussed healthy diet and exercise guidelines  Work on weight loss  Regular exercise  See Patient Instructions    Return in about 3 months (around 11/6/2019) for Routine Visit for chronic issues with PCP.    Layla Murillo MD  [facility]      "

## 2019-08-07 ASSESSMENT — ANXIETY QUESTIONNAIRES: GAD7 TOTAL SCORE: 1

## 2019-08-07 ASSESSMENT — PATIENT HEALTH QUESTIONNAIRE - PHQ9: SUM OF ALL RESPONSES TO PHQ QUESTIONS 1-9: 1

## 2019-08-14 ENCOUNTER — HOSPITAL ENCOUNTER (OUTPATIENT)
Dept: OCCUPATIONAL THERAPY | Facility: CLINIC | Age: 59
Setting detail: THERAPIES SERIES
End: 2019-08-14
Attending: INTERNAL MEDICINE
Payer: COMMERCIAL

## 2019-08-14 PROCEDURE — 97140 MANUAL THERAPY 1/> REGIONS: CPT | Mod: GO

## 2019-08-14 PROCEDURE — 97535 SELF CARE MNGMENT TRAINING: CPT | Mod: GO

## 2019-08-14 NOTE — ADDENDUM NOTE
Encounter addended by: Karen Johnson, OT on: 8/14/2019 5:33 PM   Actions taken: Flowsheet data copied forward, Flowsheet accepted

## 2019-08-14 NOTE — ADDENDUM NOTE
Encounter addended by: Karen Johnson, OT on: 8/14/2019 5:31 PM   Actions taken: Flowsheet data copied forward, Sign clinical note, Flowsheet accepted, Episode edited

## 2019-08-14 NOTE — PROGRESS NOTES
"   08/14/19 1600   Signing Clinician's Name / Credentials   Signing clinician's name / credentials  Kapil Johnson, OTR/L, CLT   Session Number   Session Number 12 [facility] (PROGRESS NOTE, SEE ADDENDUM TO 3/19/2019 DISCHARGE NOTE)   Goal 1   Goal identifier volume   Goal description For decreased risk of infection, skin breakdown/wounds & progressive soft-tissue fibrosis and improved fit of footwear, volume will be reduced by 100 mL RBK, 200 mL LBK   Target date 10/14/19   Goal 3   Goal identifier home program   Goal description For long-term home mgmt chronic lymphedema pt/caregiver independent in home program a. GCB/GCB alternative garment for night wear b. compression garment for day wear c. ex to incr lymph flow/self-MLD.   Target date 10/14/19   Subjective Report   Subjective Report \"I've been wearing the velcro-strap binders and everything, just not every day.  I haven't had them on since Monday (8/12)   Objective Measure 1   Objective Measure BBK volume   Details see attached flowsheet: patient demonstrates volume loss vs. 3/19/2019 measurements: RBK volume has decreased 9% (462 mL), LBK volume has reduced 5% (264 mL), difference of 12% RBK vs. LBK   Self Care/Home Management   Treatment Detail reinforced wear schedule of compression, stressing importance of 22-23 hours daily wear compression, how to acquire new velcro strap compression binders, reinforced lymphedema precautions   Self-Care/Home Mgmt/ADL, Compensatory, Meal Prep Minutes (83996) 30 Minutes   Skilled Intervention ED   Patient Response patient verbalized understanding of all ed provided   Manual Therapy   Manual Therapy Minutes (38403) 15   Skilled Intervention measurements   Treatment Detail BBK measurements   Plan   Home program BBK compression socks for day wear, night time Farrow classic compression binders with footpiece with option for hybrid socks with binders for day time   Updates to plan of care patient presents this date with " exacerbation of lymphedema symptoms: 3+ pitting BL pre-tibial, 2+ BL dorsum of feet, though volume measurements have decreased BL (patient also with recent 22# weight loss); recommended one week additional CDT, patient unable to schedule at this time.  Importance of 22-23 hours daily compression reinforced (patient is not wearing daily compression), patient in agreement, plan to follow up in one month to assess efficacy of home program.  (see 3/19/2019 discharge addendum)   Plan for next session assess efficacy of home program   Total Session Time   Timed Code Treatment Minutes 45   Total Treatment Time (sum of timed and untimed services) 45

## 2019-09-03 ENCOUNTER — TRANSFERRED RECORDS (OUTPATIENT)
Dept: HEALTH INFORMATION MANAGEMENT | Facility: CLINIC | Age: 59
End: 2019-09-03

## 2019-09-18 ENCOUNTER — HOSPITAL ENCOUNTER (OUTPATIENT)
Dept: OCCUPATIONAL THERAPY | Facility: CLINIC | Age: 59
Setting detail: THERAPIES SERIES
End: 2019-09-18
Attending: INTERNAL MEDICINE
Payer: COMMERCIAL

## 2019-09-18 PROCEDURE — 97535 SELF CARE MNGMENT TRAINING: CPT | Mod: GO

## 2019-09-18 PROCEDURE — 97140 MANUAL THERAPY 1/> REGIONS: CPT | Mod: GO

## 2019-09-18 NOTE — PROGRESS NOTES
"   09/18/19 0820   Signing Clinician's Name / Credentials   Signing clinician's name / credentials  Kapil Johnson, OTR/L, CLT   Session Number   [facility] Number [facility] (PROGRESS NOTE, SEE ADDENDUM TO 3/19/2019 DISCHARGE NOTE)   Goal 1   Goal identifier volume   Goal description For decreased risk of infection, skin breakdown/wounds & progressive soft-tissue fibrosis and improved fit of footwear, volume will be reduced by 100 mL RBK, 200 mL LBK   Target date 10/14/19   Date met 09/18/19  (GOAL MET, EXCEEDED)   Goal 3   Goal identifier home program   Goal description For long-term home mgmt chronic lymphedema pt/caregiver independent in home program a. GCB/GCB alternative garment for night wear b. compression garment for day wear c. ex to incr lymph flow/self-MLD.   Target date 10/14/19   Date met 09/18/19  (GOAL MET)   Subjective Report   Subjective Report \"I've been wearing the compression every day and night.... I think I need some new Velcro binders, though, they're giving out. But I haven't had an infection in these legs since January!\"   Objective Measure 1   Objective Measure BBK volume   Details see attached flowsheet:  vs. 8/14/19 measurements, RBK has reduced 345 mL (8%), LBK has reduced 611 mL (13%).  BBK difference now 302 mL (7%)   Self Care/Home Management   Treatment Detail reinforced lymphedema precautions, how to obtain new compression garments, copy of garment order provided   Self-Care/Home Mgmt/ADL, Compensatory, Meal Prep Minutes (98958) 15 Minutes   Skilled Intervention ED   Patient Response patient verbalized understanding of all ed provided; patient in agreement with POC, d/c this date, feels confident re: HP   Progress ALL GOALS MET   Manual Therapy   Manual Therapy Minutes (02172) 15   Skilled Intervention measurements   Patient Response Re: volume reduction:  \"that's great.  I guess I thought I could follow the program for awhile and my legs would be okay.  I guess I do " "have to keep up with this (compression)\"   Treatment Detail BBK measurements   Progress ALL GOALS MET   Plan   Home program BBK compression socks for day wear, night time Farrow classic compression binders with footpiece with option for hybrid socks with binders for day time   Updates to plan of care DISCHARGE THIS DATE   Total Session Time   Timed Code Treatment Minutes 30   Total Treatment Time (sum of timed and untimed services) 30     "

## 2019-10-04 ENCOUNTER — TRANSFERRED RECORDS (OUTPATIENT)
Dept: HEALTH INFORMATION MANAGEMENT | Facility: CLINIC | Age: 59
End: 2019-10-04

## 2019-10-17 DIAGNOSIS — E11.21 TYPE 2 DIABETES MELLITUS WITH DIABETIC NEPHROPATHY, WITHOUT LONG-TERM CURRENT USE OF INSULIN (H): Chronic | ICD-10-CM

## 2019-10-17 NOTE — TELEPHONE ENCOUNTER
Requested Prescriptions   Pending Prescriptions Disp Refills     metFORMIN (GLUCOPHAGE-XR) 500 MG 24 hr tablet 360 tablet 1     Sig: Take 2 tablets (1,000 mg) by mouth 2 times daily (with meals)  Last Written Prescription Date:  3/4/2019  Last Fill Quantity: 360 tablet,  # refills: 1   Last Office Visit: 8/6/2019   Future Office Visit:    Next 5 appointments (look out 90 days)    Nov 08, 2019  8:20 AM CST  Office Visit with Layla Murillo MD  [facility] ([facility]) [address] [place] [id number] [phone]              Biguanide Agents Passed - 10/17/2019  8:19 AM        Passed - Blood pressure less than 140/90 in past 6 months     BP Readings from Last 3 Encounters:   08/06/19 108/60   06/13/19 144/72   05/31/19 129/69           Passed - Patient has documented LDL within the past 12 mos.     Recent Labs   Lab Test 08/06/19  0927   LDL 60           Passed - Patient has had a Microalbumin in the past 15 mos.     Recent Labs   Lab Test 05/31/19  0831   MICROL 111   UMALCR 75.51*           Passed - Patient is age 10 or older        Passed - Patient has documented A1c within the specified period of time.     If HgbA1C is 8 or greater, it needs to be on file within the past 3 months.  If less than 8, must be on file within the past 6 months.     Recent Labs   Lab Test 05/31/19  0830   A1C 5.5           Passed - Patient's CR is NOT>1.4 OR Patient's EGFR is NOT<45 within past 12 mos.     Recent Labs   Lab Test 08/06/19  0927   GFRESTIMATED 84   GFRESTBLACK >90     Recent Labs   Lab Test 08/06/19  0927   CR 0.98           Passed - Patient does NOT have a diagnosis of CHF.        Passed - Medication is active on med list        Passed - Recent (6 mo) or future (30 days) visit within the authorizing provider's specialty     Patient had office visit in the last 6 months or has a visit in the next 30 days with authorizing provider or within the authorizing provider's specialty.  " See \"Patient Info\" tab in inbasket, or \"Choose Columns\" in Meds & Orders section of the refill encounter.              "

## 2019-10-17 NOTE — TELEPHONE ENCOUNTER
Reason for Call:  Medication or medication refill:    Do you use a [facility]?  Name of the pharmacy and phone number for the [facility]    Name of the medication requested: metformin    Other request: this medication was normally filled by different provider and the refills left are just about done one refill left. [facility] that they need new provider to sent a new hard copy to keep filling this medication for patient    Can we leave a detailed message on this number? YES    Phone number patient can be reached at: Work number on file:  [phone] (work)    Best Time: any    Call taken on 10/17/2019 at 8:16 AM by Karen Souza

## 2019-10-20 RX ORDER — METFORMIN HCL 500 MG
1000 TABLET, EXTENDED RELEASE 24 HR ORAL 2 TIMES DAILY WITH MEALS
Qty: 120 TABLET | Refills: 0 | Status: SHIPPED | OUTPATIENT
Start: 2019-10-20 | End: 2019-11-08

## 2019-10-20 NOTE — TELEPHONE ENCOUNTER
Medication is being filled for 1 time refill only due to:  Patient needs to be seen because due for DM f/u.     Signed Prescriptions:                        Disp   Refills    metFORMIN (GLUCOPHAGE-XR) 500 MG 24 hr tab*120 ta*0        Sig: Take 2 tablets (1,000 mg) by mouth 2 times daily           (with meals)  Authorizing Provider: NIRAV GARRETT  Ordering User: IVETH ANDERSON     REception - one month due for office visit f/u

## 2019-11-03 ENCOUNTER — HEALTH MAINTENANCE LETTER (OUTPATIENT)
Age: 59
End: 2019-11-03

## 2019-11-08 ENCOUNTER — OFFICE VISIT (OUTPATIENT)
Dept: FAMILY MEDICINE | Facility: CLINIC | Age: 59
End: 2019-11-08
Payer: COMMERCIAL

## 2019-11-08 VITALS
TEMPERATURE: 98.1 F | WEIGHT: 309.25 LBS | SYSTOLIC BLOOD PRESSURE: 122 MMHG | HEART RATE: 65 BPM | BODY MASS INDEX: 39.71 KG/M2 | DIASTOLIC BLOOD PRESSURE: 66 MMHG | RESPIRATION RATE: 14 BRPM | OXYGEN SATURATION: 93 %

## 2019-11-08 DIAGNOSIS — E11.21 TYPE 2 DIABETES MELLITUS WITH DIABETIC NEPHROPATHY, WITHOUT LONG-TERM CURRENT USE OF INSULIN (H): Primary | Chronic | ICD-10-CM

## 2019-11-08 DIAGNOSIS — Z23 NEED FOR PROPHYLACTIC VACCINATION AND INOCULATION AGAINST INFLUENZA: ICD-10-CM

## 2019-11-08 DIAGNOSIS — I10 ESSENTIAL HYPERTENSION: Chronic | ICD-10-CM

## 2019-11-08 DIAGNOSIS — Z23 NEED FOR DIPHTHERIA-TETANUS-PERTUSSIS (TDAP) VACCINE: ICD-10-CM

## 2019-11-08 DIAGNOSIS — E66.01 MORBID OBESITY (H): Chronic | ICD-10-CM

## 2019-11-08 DIAGNOSIS — Z23 NEED FOR SHINGLES VACCINE: ICD-10-CM

## 2019-11-08 DIAGNOSIS — F33.2 SEVERE EPISODE OF RECURRENT MAJOR DEPRESSIVE DISORDER, WITHOUT PSYCHOTIC FEATURES (H): ICD-10-CM

## 2019-11-08 LAB
ALBUMIN SERPL-MCNC: 3.9 G/DL (ref 3.4–5)
ALP SERPL-CCNC: 96 U/L (ref 40–150)
ALT SERPL W P-5'-P-CCNC: 31 U/L (ref 0–70)
ANION GAP SERPL CALCULATED.3IONS-SCNC: 8 MMOL/L (ref 3–14)
AST SERPL W P-5'-P-CCNC: 15 U/L (ref 0–45)
BILIRUB SERPL-MCNC: 1.1 MG/DL (ref 0.2–1.3)
BUN SERPL-MCNC: 21 MG/DL (ref 7–30)
CALCIUM SERPL-MCNC: 9 MG/DL (ref 8.5–10.1)
CHLORIDE SERPL-SCNC: 107 MMOL/L (ref 94–109)
CO2 SERPL-SCNC: 24 MMOL/L (ref 20–32)
CREAT SERPL-MCNC: 0.83 MG/DL (ref 0.66–1.25)
GFR SERPL CREATININE-BSD FRML MDRD: >90 ML/MIN/{1.73_M2}
GLUCOSE SERPL-MCNC: 129 MG/DL (ref 70–99)
HBA1C MFR BLD: 5.7 % (ref 0–5.6)
POTASSIUM SERPL-SCNC: 4.2 MMOL/L (ref 3.4–5.3)
PROT SERPL-MCNC: 6.7 G/DL (ref 6.8–8.8)
SODIUM SERPL-SCNC: 139 MMOL/L (ref 133–144)

## 2019-11-08 PROCEDURE — 83036 HEMOGLOBIN GLYCOSYLATED A1C: CPT | Performed by: FAMILY MEDICINE

## 2019-11-08 PROCEDURE — 90471 IMMUNIZATION ADMIN: CPT | Performed by: FAMILY MEDICINE

## 2019-11-08 PROCEDURE — 99214 OFFICE O/P EST MOD 30 MIN: CPT | Mod: 25 | Performed by: FAMILY MEDICINE

## 2019-11-08 PROCEDURE — 36415 COLL VENOUS BLD VENIPUNCTURE: CPT | Performed by: FAMILY MEDICINE

## 2019-11-08 PROCEDURE — 80053 COMPREHEN METABOLIC PANEL: CPT | Performed by: FAMILY MEDICINE

## 2019-11-08 PROCEDURE — 90715 TDAP VACCINE 7 YRS/> IM: CPT | Performed by: FAMILY MEDICINE

## 2019-11-08 RX ORDER — METFORMIN HCL 500 MG
1000 TABLET, EXTENDED RELEASE 24 HR ORAL 2 TIMES DAILY WITH MEALS
Qty: 360 TABLET | Refills: 3 | Status: SHIPPED | OUTPATIENT
Start: 2019-11-08 | End: 2020-10-28

## 2019-11-08 RX ORDER — LISINOPRIL 20 MG/1
20 TABLET ORAL DAILY
Qty: 1 TABLET | Refills: 0 | COMMUNITY
Start: 2019-11-08 | End: 2020-05-26

## 2019-11-08 NOTE — PROGRESS NOTES
Subjective     Peterson Vazquez is a 58 year old male who presents to clinic today for the following health issues:    HPI   Diabetes Follow-up  How often are you checking your blood sugar? Was doing One time daily  What time of day are you checking your blood sugars (select all that apply)?  Before meals  Have you had any blood sugars above 200?  No  Have you had any blood sugars below 70?  No    What symptoms do you notice when your blood sugar is low?  None    What concerns do you have today about your diabetes? None     Do you have any of these symptoms? (Select all that apply)  No numbness or tingling in feet.  No redness, sores or blisters on feet.  No complaints of excessive thirst.  No reports of blurry vision.  No significant changes to weight. - pt state have neuropathy on both feet       Have you had a diabetic eye exam in the last 12 months? Yes- Date of last eye exam: [facility] 9/2019    BP Readings from Last 2 Encounters:   11/08/19 122/66   08/06/19 108/60     Hemoglobin A1C (%)   Date Value   11/08/2019 5.7 (H)   05/31/2019 5.5     LDL Cholesterol Calculated (mg/dL)   Date Value   08/06/2019 60   05/31/2019     Cannot estimate LDL when triglyceride exceeds 400 mg/dL     LDL Cholesterol Direct (mg/dL)   Date Value   05/31/2019 79   Diabetes Management Resources    How many servings of fruits and vegetables do you eat daily?  2-3    On average, how many sweetened beverages do you drink each day (soda, juice, sweet tea, etc)?   0    How many days per week do you miss taking your medication? 0    BACKGROUND  Hx of morbid obesity, DM on asa & metformin, DM nephropathy DM polyneuropathy, HLD on Lipitor, HTN on lisinopril, AMERICA on CPAP, chronic acquired lymphedema on compression socks, hx of prior DVT right peroneal proximal calf in 2016 on eliquis then 3 months as secondary to cellulitis at that time. Seen by hematology & recheck u/S after 3 months anticoagulation showed no DVT & advised no long term  anticoagulation at that time.   Seen first by Dr Duffy in 1/2003 as a hospital follow up from [facility] & noted depression. On 2/2003 noted bad wave of anxiety , felt wanted to be dead & stopped Prozac & felt better. a 3/11/03 note stated He was seen on 10-3-02 with symptoms of depression and was started on Prozac 10 mg daily. Very quickly he felt anxious and at one point felt was walking through a tunnel and felt as though someone was stalking him. He realized this was not true. At another time he felt almost suicidal. He believed both of these events were related to the Prozac and stopped the med. Since then he felt definitely less anxious though still depressed. The multiple stressors with which he presented in Oct. had escalated. He and his then wife had both been in counseling and had since , planning a divorce. At that point was started on Lexapro 10 mg & was also being evaluated for recurrent abdominal pain. Continued on Lexapro 10 mg all through 2004 & 2005. No mention of depression med in notes until 2007 note when mentioned to continue citalopram ( ? Typo) & was moving to [place].   Next seen 8/2010 at [facility] by nickie mota for a physical & noted then on tetracycline for rosacea & no longer noted to be on any antidepressant. Was just on a statin. In 2011 had an umbilical hernia surgery. In nov 2012 returned to dr Duffy for back pain & 1/30/13 reestablished care with dr Duffy. TCN no longer made/ covered & given Metrogel then. Noted also had a girlfriend at the time. In 9/2013 referred to sleep for snoring & fatigue. Seen by sleep Dr Montaño in 10/2013 & soon after that started on a CPAP. On 3/19/14 started on minocycline for rosacea. On 5/2015 seen by Dr Du for a physical & then seen 12/2015 transfer care to Dr Pradhan who was also primary for  his dad (brought in from [place] who had dementia & second wife was his primary care giver). On metformin by then. Noted care giver stress. In er 5/2/16  for kidney stones. In er admitted 6/12 to 6/14 for cellulitis right leg & associated DVT right peroneal vein. Seen for a physical 3/2017 by dr garcia. Seen by hematology Dr Chowdhury 3/2017, u/S was negative 4/2017 & eliquis discontinued as had taken for about 9 months by then. In er 6/2017 for kidney stones. Seen by [facility] for his sleep concerns,   Hx of most recent cellulitis was left lower leg in 1/2019, with prior strep & staph, hx of rosacea on minocycline in the past & on Metrogel, chronic rhinitis on Claritin, hx of prior kidney stone noted stones in bladder & left kidney an ct 2017, prior tooth extraction, tonsillectomy, on vit d, B 12, magnesium, turmeric, intolerant to fluoxetine, normal stress test in 2011, seen by prior PCP Dr Pradhan 4/26/18 for a physical & noted then was on a keto diet. Admitted then 1/18 to 1/23/19 for cellulitis secondary O a laceration in left foot sole. U/S negative for DVT, Cx grew staph  GBS strep, evaluated by ID, HBA1c was 6.7 at the time, given vanc & zosyn then ceftriaxone then discharged on Augmentin & lymphedema treatment. Seen by Dr Woods 1/28 hospital follow up & doxycycline given. On 2/13 given keflex on patients insistence that cellulitis still there, had mostly venous stasis dermatitis. Seen 3/4 & no infection sen. given Metrogel for rosacea, LDL was elevated at 99 & cbc was normal. Did lymphedema treatment with good results & discharged wearing compressions socks from OT on 3/19  Seen 4/25 first time by this writer as PCP on maternity leave. Had severe depression, NETTIE & addressed dm, polyneuropathy, Morbid obesity, HTN , AMERICA ^ HLD. Lymphedema & venous stasis dermatitis & discoloration were under control with use of compression socks. Had no recurrence of prior DVT or kidney stones & discussed chronic abx use itz minocycline may not be wise specially given current low mood & referred to dermatology. Was restarted on Lexapro, referred to psyche & day treatment & continued  with his psychologist. Labs done later showed elevated microalbumin, normal CMP, TSH, cbc, with mildly elevated HBA1c. Declined colonoscopy, was to do a FIT test & deferred Tdap & shingrex to another day.   Seen 5/9/19 for a physical & was feeling 75 % better. When started the Lexapro had one day of lethargy, was functioning and coping better. he plans to do more exercise & has joined the [employer] & has got a . He'd been going to therapy & felt competent and more stable, was seeing his therapist once a week, been a couple times since started  To see psyche 5/30 & was on the wait list for 55 +day treatment at [facility]. Thought felt would go through his own therapist Dr Elham Najera, as  concerned about the time commitment & difficulty with work, & employability. Was to see dermatology too at [facility].  exam declined. Had a  low sat and cough but cxr was normal. Diabetes  & BP was good. Reminded was due for a dm eye check. Continued on CPAP. Was to consider PSA another time & Tdap and shingles when wife got back from her trip. CTA chest done due to low SATs which was negative for PE. Did show coronary calcifications for which referred to cardiology. Seen by cardiology Dr Sotomayor on 6/13 and advised to increase atorvastatin to 80 and lisinopril to 20 and advised fasting lipids in 3 months. Seen by derm on 6/17 for rosacea, SK, & dermatitis & continued on current meds.  Seen 8/6 for HTN, BP was low, noted dizziness so advised to decrease lisinopril to 15, continued Lipitor, declined shingrex. Noted had lost 20 lbs with slimgeneics 7 DM was doing good. Seen by cardiology. Under care of sleep. Mood better on Abilify. PSA was normal. Lipids in control, CMP normal , bili mildly elevated possibly gilberts glucose was elevated. Seen by [facility] on 10/4 for AMERICA and continued on CPAP at 8 to 16 mmHg. Seen by OT on 10/16 & continued on compression socks.     CURRENTLY  Diabetes: good. HBA1c at goal. On asa, on  metformin takes 500 XR 2 bid. On a statin. On ACE. Had a eye check recently noted normal.   HTN stable. Didn't get lisinopril 15 so continue don lisinopril 20, not dizzy and BP not low but wnl. Does not need refills as got a lot.   BMI > 39 , up 3 lbs. Due to busy tax seasons not exercising as much. Still doing slim genics.  Depression /anxiety stable on Lexapro & Abilify , seen by psyche last week. No longer needing therapy, can go n as needed. Still with wife who is now in counseling.   Seen by sleep on CPAP, got wrong mask and seen past week to get changed.   Lymph edema stable on compression socks, seen by OT. No open sores.   Got over recent chest cold. Currently no fever or chills, no headache or dizziness, no double or blurry vision, no facial pain, earache, sore throat, runny nose, post nasal drip, no trouble hearing, smelling, tasting or swallowing, no cough , no chest pain, trouble breathing or palpitations, No abdominal pain, heart burn, reflux, nausea or vomiting or diarrhea or constipation, no blood in stools or black stools, no weight loss or night sweats. No dysuria, hematuria, frequency, urgency, hesitancy, incontinence, No pelvic complaints. No joint pain. No rash.  Agreeable to Tdap today. Had flu shot at work. Will consider shingrex in the future.  Patient Active Problem List   Diagnosis     Hyperlipidemia     Chronic rhinitis     Morbid obesity (H)     Rosacea     Type 2 diabetes mellitus with diabetic nephropathy; goal HgbA1c < 7%     Essential hypertension; goal < 140/90     History of nephrolithiasis     AMERICA (obstructive sleep apnea)     Diabetic polyneuropathy associated with type 2 diabetes mellitus (H)     History of deep venous thrombosis right peroneal vein provoked by cellulitis     Chronic acquired lymphedema     Venous stasis dermatitis of both lower extremities     Severe episode of recurrent major depressive disorder, without psychotic features (H)     Microalbuminuria     Coronary  artery calcification seen on CAT scan     Past Surgical History:   Procedure Laterality Date     HC TOOTH EXTRACTION W/FORCEP      WISDOM TEETH     HERNIA REPAIR  2011    mesh     TONSILLECTOMY      TONSILS       Social History     Tobacco Use     Smoking status: Never Smoker     Smokeless tobacco: Never Used   Substance Use Topics     Alcohol use: Yes     Alcohol/week: 0.0 standard drinks     Comment: 1 drink monthly     Family History   Problem Relation Age of Onset     Cancer Mother         CA brain, lung, starte din tear duct,  age 52     Other Cancer Mother      Cardiovascular Father         CABG @ 74, periph. vas. disease     Alzheimer Disease Father      Chronic Obstructive Pulmonary Disease Father      Other - See Comments Father         GBS     Depression Father      Cardiovascular Brother         MI, chf dx 40,  mi age 50      Obesity Brother      Depression Other      Cancer - colorectal No family hx of          Current Outpatient Medications   Medication Sig Dispense Refill     lisinopril (PRINIVIL/ZESTRIL) 20 MG tablet Take 1 tablet (20 mg) by mouth daily 1 tablet 0     metFORMIN (GLUCOPHAGE-XR) 500 MG 24 hr tablet Take 2 tablets (1,000 mg) by mouth 2 times daily (with meals) 360 tablet 3     ARIPiprazole (ABILIFY) 2 MG tablet Take 1 tablet (2 mg) by mouth daily       aspirin 81 MG EC tablet Take 1 tablet (81 mg) by mouth daily 90 tablet 3     atorvastatin (LIPITOR) 80 MG tablet Take 1 tablet (80 mg) by mouth daily 90 tablet 3     Blood Glucose Monitoring Suppl (BLOOD GLUCOSE METER) KIT 1 Device daily 1 kit 2     cholecalciferol (VITAMIN D) 1000 UNIT tablet Take 1 tablet (1,000 Units) by mouth daily 100 tablet 3     Cyanocobalamin (B-12) 1000 MCG TBCR Take 1,000 mcg by mouth daily 100 tablet 1     escitalopram (LEXAPRO) 10 MG tablet TAKE 1 TABLET DAILY (FURTHER RECOMMENDATIONS BY PSYCHE) 90 tablet 0     loratadine (CLARITIN) 10 MG tablet Take 10 mg by mouth At Bedtime       magnesium gluconate  (MAGONATE) 500 (27 Mg) MG tablet Take 500 mg by mouth At Bedtime       metroNIDAZOLE (METROGEL) 1 % external gel Apply topically daily 60 g 11     Multiple Vitamin (DAILY MULTIVITAMIN PO) Take 1 tablet by mouth At Bedtime        order for DME BBK 20-30 MM COMPRESSION SOCKS/ BBK FARROW CLASSIC VELCRO STRAP BINDERS WITH FOOT PIECES AND SOCK LINERS/ HYBRID SOCK 1 each 1     triamcinolone (KENALOG) 0.1 % external cream Apply topically 3 times daily 453.6 g 1     Turmeric (RA TURMERIC) 500 MG CAPS Take 1 capsule by mouth daily       Allergies   Allergen Reactions     Fluoxetine Hcl      Prozac: anxiety     Recent Labs   Lab Test 11/08/19  0820 08/06/19  0927  05/31/19  0830 04/25/19  0923  04/27/18  1027  03/03/17  1012   A1C 5.7*  --   --  5.5 7.5*   < > 6.6*  --  7.5*   LDL  --  60  --  Cannot estimate LDL when triglyceride exceeds 400 mg/dL  79  --    < > 77  --  87   HDL  --  36*  --  34*  --   --  37*  --  44   TRIG  --  103  --  565*  --   --  305*  --  294*   ALT 31 33  --  37 43   < > 48  --  47   CR 0.83 0.98  --  0.85 0.92   < > 0.89   < > 0.91   GFRESTIMATED >90 84   < > >90 90   < > 88   < > 85   GFRESTBLACK >90 >90   < > >90 >90   < > >90   < > >90  African American GFR Calc     POTASSIUM 4.2 5.2  --  4.2 5.1   < > 4.3   < > 5.1   TSH  --   --   --   --  1.08  --   --   --  1.29    < > = values in this interval not displayed.      BP Readings from Last 3 Encounters:   11/08/19 122/66   08/06/19 108/60   06/13/19 144/72    Wt Readings from Last 3 Encounters:   11/08/19 140.3 kg (309 lb 4 oz)   08/06/19 138.9 kg (306 lb 4 oz)   06/13/19 146.7 kg (323 lb 8 oz)                    Reviewed and updated as needed this visit by Provider  Tobacco  Allergies  Meds  Problems  Med Hx  Surg Hx  Fam Hx  Soc Hx          Review of Systems   ROS COMP: Constitutional, HEENT, cardiovascular, pulmonary, GI, , musculoskeletal, neuro, skin, endocrine and psych systems are negative, except as otherwise noted.       Objective    /66 (BP Location: Right arm, Patient Position: Chair, Cuff Size: Adult Large)   Pulse 65   Temp 98.1  F (36.7  C) (Oral)   Resp 14   Wt 140.3 kg (309 lb 4 oz)   SpO2 93%   BMI 39.71 kg/m    Body mass index is 39.71 kg/m .  Physical Exam   GENERAL: alert, no distress and obese  EYES: Eyes grossly normal to inspection, PERRL and conjunctivae and sclerae normal  HENT: ear canals and TM's normal, nose and mouth without ulcers or lesions  NECK: no adenopathy, no asymmetry, masses, or scars and thyroid normal to palpation  RESP: lungs clear to auscultation - no rales, rhonchi or wheezes  CV: regular rate and rhythm, normal S1 S2, no S3 or S4, no murmur, click or rub, no peripheral edema and peripheral pulses strong  ABDOMEN: soft, non tender, no hepatosplenomegaly, no masses and bowel sounds normal  MS: extremities normal- no gross deformities noted. Wearing B/l compression socks.  SKIN: no suspicious lesions or rashes  NEURO: Normal strength and tone, mentation intact and speech normal  PSYCH: mentation appears normal, affect normal/bright    Diagnostic Test Results:  Labs reviewed in Epic  No results found for this or any previous visit (from the past 24 hour(s)).        Assessment & Plan     1. Type 2 diabetes mellitus with diabetic nephropathy, without long-term current use of insulin (H)  Diabetes well controlled continue current meds. Can take metformin all at one time or continue twice a day as doing  Blood pressure well controlled continue current meds. Has enough lisinopril 20 mg BP stable, continue same   Recommend flu shot yearly. utd from work. Due for Tdap. Given today. Consider shingles vaccine. shingles vaccines (series of 2 shots 2 to 6 months apart that can cause couple days of flu like symptoms but is expensive so check with insurance and get at pharmacy where cheaper). Continue care with psychiatry, sleep, cardiology, psychiatrist, therapist, CPAP, OT. Return in 3 to 6 months  "to follow-up on diabetes and blood pressure.  - metFORMIN (GLUCOPHAGE-XR) 500 MG 24 hr tablet; Take 2 tablets (1,000 mg) by mouth 2 times daily (with meals)  Dispense: 360 tablet; Refill: 3  - Hemoglobin A1c  - Comprehensive metabolic panel  - VACCINE ADMINISTRATION, INITIAL    2. Essential hypertension; goal < 140/90  BP goal on 20 mg lisinopril without side effects so continue same. Reports has enough meds for a long time.  - Comprehensive metabolic panel  - lisinopril (PRINIVIL/ZESTRIL) 20 MG tablet; Take 1 tablet (20 mg) by mouth daily  Dispense: 1 tablet; Refill: 0    3. Morbid obesity (H)  Lifestyle, diet &  Exercise discussed.   - Comprehensive metabolic panel    4. Severe episode of recurrent major depressive disorder, without psychotic features (H)  Stable on Lexapro & Abilify under care of psyche & sees therapy now prn.    5. Need for prophylactic vaccination and inoculation against influenza  Received at work about 1 month ago.     6. Need for diphtheria-tetanus-pertussis (Tdap) vaccine  - VACCINE ADMINISTRATION, INITIAL  - TDAP VACCINE    7. Need for shingles vaccine  Will consider it.      BMI:   Estimated body mass index is 39.71 kg/m  as calculated from the following:    Height as of 5/31/19: 1.88 m (6' 2\").    Weight as of this encounter: 140.3 kg (309 lb 4 oz).   Weight management plan: Discussed healthy diet and exercise guidelines  Work on weight loss  Regular exercise  See Patient Instructions    Return in about 5 months (around 4/8/2020) for Routine Visit for chronic issues with PCP.    Layla Murillo MD  [facility] [place]      "

## 2019-11-08 NOTE — PATIENT INSTRUCTIONS
Diabetes well controlled continue current meds. Can take metformin all at one time or continue twice a day as doing  Blood pressure well controlled continue current meds. Has enough lisinopril 20 mg BP stable, continue same   Recommend flu shot yearly. utd from work  Due for Tdap. Given today   Consider shingles vaccine. shingles vaccines (series of 2 shots 2 to 6 months apart that can cause couple days of flu like symptoms but is expensive so check with insurance and get at pharmacy where cheaper).  Return in 3 to 6 months to follow-up on diabetes and blood pressure.  Continue care with psychiatry, sleep, cardiology, psychiatrist, therapist, CPAP, OT.

## 2019-11-08 NOTE — NURSING NOTE
Prior to immunization administration, verified patients identity using patient s name and date of birth. Please see Immunization Activity for additional information.     Screening Questionnaire for Adult Immunization    Are you sick today?   No   Do you have allergies to medications, food, a vaccine component or latex?   No   Have you ever had a serious reaction after receiving a vaccination?   No   Do you have a long-term health problem with heart disease, lung disease, asthma, kidney disease, metabolic disease (e.g. diabetes), anemia, or other blood disorder?   No   Do you have cancer, leukemia, HIV/AIDS, or any other immune system problem?   No   In the past 3 months, have you taken medications that affect  your immune system, such as prednisone, other steroids, or anticancer drugs; drugs for the treatment of rheumatoid arthritis, Crohn s disease, or psoriasis; or have you had radiation treatments?   No   Have you had a seizure, or a brain or other nervous system problem?   No   During the past year, have you received a transfusion of blood or blood     products, or been given immune (gamma) globulin or antiviral drug?   No   For women: Are you pregnant or is there a chance you could become        pregnant during the next month?   No   Have you received any vaccinations in the past 4 weeks?   No     Immunization questionnaire answers were all negative.        Per orders of Layla Madden, injection of tdap given by Silviano Carty MA. Patient instructed to remain in clinic for 15 minutes afterwards, and to report any adverse reaction to me immediately.       Screening performed by Silviano Carty MA on 11/8/2019 at 9:21 AM.

## 2019-11-08 NOTE — RESULT ENCOUNTER NOTE
Ninijeronimo Mr. Vazquez,  Your results came back and are within acceptable limits. -Liver and gallbladder tests (ALT,AST, Alk phos,bilirubin) are normal.  -Kidney function (GFR) is normal.  -Sodium is normal.  -Potassium is normal.  -Calcium is normal.  -Glucose is elevated due to your diabetes.. If you have any further concerns please do not hesitate to contact us by message, phone or making an appointment.  Have a good day   Dr Chidi MCKEE

## 2019-11-11 ASSESSMENT — ANXIETY QUESTIONNAIRES
2. NOT BEING ABLE TO STOP OR CONTROL WORRYING: NOT AT ALL
GAD7 TOTAL SCORE: 0
7. FEELING AFRAID AS IF SOMETHING AWFUL MIGHT HAPPEN: NOT AT ALL
1. FEELING NERVOUS, ANXIOUS, OR ON EDGE: NOT AT ALL
3. WORRYING TOO MUCH ABOUT DIFFERENT THINGS: NOT AT ALL
IF YOU CHECKED OFF ANY PROBLEMS ON THIS QUESTIONNAIRE, HOW DIFFICULT HAVE THESE PROBLEMS MADE IT FOR YOU TO DO YOUR WORK, TAKE CARE OF THINGS AT HOME, OR GET ALONG WITH OTHER PEOPLE: NOT DIFFICULT AT ALL
5. BEING SO RESTLESS THAT IT IS HARD TO SIT STILL: NOT AT ALL
6. BECOMING EASILY ANNOYED OR IRRITABLE: NOT AT ALL

## 2019-11-11 ASSESSMENT — PATIENT HEALTH QUESTIONNAIRE - PHQ9
5. POOR APPETITE OR OVEREATING: NOT AT ALL
SUM OF ALL RESPONSES TO PHQ QUESTIONS 1-9: 0

## 2019-11-12 ASSESSMENT — ANXIETY QUESTIONNAIRES: GAD7 TOTAL SCORE: 0

## 2020-05-21 DIAGNOSIS — I10 ESSENTIAL HYPERTENSION: Chronic | ICD-10-CM

## 2020-05-21 DIAGNOSIS — E11.21 TYPE 2 DIABETES MELLITUS WITH DIABETIC NEPHROPATHY, WITHOUT LONG-TERM CURRENT USE OF INSULIN (H): Chronic | ICD-10-CM

## 2020-05-26 RX ORDER — ATORVASTATIN CALCIUM 80 MG/1
TABLET, FILM COATED ORAL
Qty: 90 TABLET | Refills: 0 | Status: SHIPPED | OUTPATIENT
Start: 2020-05-26 | End: 2020-08-21

## 2020-05-26 RX ORDER — LISINOPRIL 20 MG/1
TABLET ORAL
Qty: 90 TABLET | Refills: 0 | Status: SHIPPED | OUTPATIENT
Start: 2020-05-26 | End: 2020-08-21

## 2020-05-26 NOTE — TELEPHONE ENCOUNTER
"Prescription approved per [facility] Refill Protocol.  Heike Rodriguez RN            11/8/19 office visit notes per Chidi\" Has enough lisinopril 20 mg BP stable, continue same \"  "

## 2020-08-19 DIAGNOSIS — I10 ESSENTIAL HYPERTENSION: Chronic | ICD-10-CM

## 2020-08-19 DIAGNOSIS — E11.21 TYPE 2 DIABETES MELLITUS WITH DIABETIC NEPHROPATHY, WITHOUT LONG-TERM CURRENT USE OF INSULIN (H): Chronic | ICD-10-CM

## 2020-08-21 RX ORDER — ATORVASTATIN CALCIUM 80 MG/1
80 TABLET, FILM COATED ORAL DAILY
Qty: 90 TABLET | Refills: 0 | Status: SHIPPED | OUTPATIENT
Start: 2020-08-21 | End: 2020-11-21

## 2020-08-21 RX ORDER — LISINOPRIL 20 MG/1
20 TABLET ORAL DAILY
Qty: 90 TABLET | Refills: 0 | Status: SHIPPED | OUTPATIENT
Start: 2020-08-21 | End: 2020-10-28

## 2020-08-21 NOTE — TELEPHONE ENCOUNTER
Medication is being filled for 1 time refill only due to:  Patient needs to be seen because labs/htn/diabetes. Pleae call to assist with an appointment.

## 2020-10-28 ENCOUNTER — OFFICE VISIT (OUTPATIENT)
Dept: FAMILY MEDICINE | Facility: CLINIC | Age: 60
End: 2020-10-28
Payer: COMMERCIAL

## 2020-10-28 VITALS
SYSTOLIC BLOOD PRESSURE: 126 MMHG | RESPIRATION RATE: 16 BRPM | TEMPERATURE: 97.5 F | BODY MASS INDEX: 41.75 KG/M2 | OXYGEN SATURATION: 97 % | HEIGHT: 73 IN | HEART RATE: 61 BPM | WEIGHT: 315 LBS | DIASTOLIC BLOOD PRESSURE: 84 MMHG

## 2020-10-28 DIAGNOSIS — I89.0 CHRONIC ACQUIRED LYMPHEDEMA: Chronic | ICD-10-CM

## 2020-10-28 DIAGNOSIS — L71.9 ROSACEA: Chronic | ICD-10-CM

## 2020-10-28 DIAGNOSIS — Z12.5 SCREENING FOR PROSTATE CANCER: ICD-10-CM

## 2020-10-28 DIAGNOSIS — R80.9 MICROALBUMINURIA: ICD-10-CM

## 2020-10-28 DIAGNOSIS — E66.01 MORBID OBESITY (H): Chronic | ICD-10-CM

## 2020-10-28 DIAGNOSIS — Z87.442 HISTORY OF NEPHROLITHIASIS: Chronic | ICD-10-CM

## 2020-10-28 DIAGNOSIS — F33.2 SEVERE EPISODE OF RECURRENT MAJOR DEPRESSIVE DISORDER, WITHOUT PSYCHOTIC FEATURES (H): ICD-10-CM

## 2020-10-28 DIAGNOSIS — I10 ESSENTIAL HYPERTENSION: Chronic | ICD-10-CM

## 2020-10-28 DIAGNOSIS — Z01.84 IMMUNITY STATUS TESTING: ICD-10-CM

## 2020-10-28 DIAGNOSIS — I83.018 VENOUS STASIS ULCER OF OTHER PART OF RIGHT LOWER LEG LIMITED TO BREAKDOWN OF SKIN WITH VARICOSE VEINS (H): ICD-10-CM

## 2020-10-28 DIAGNOSIS — L97.811 VENOUS STASIS ULCER OF OTHER PART OF RIGHT LOWER LEG LIMITED TO BREAKDOWN OF SKIN WITH VARICOSE VEINS (H): ICD-10-CM

## 2020-10-28 DIAGNOSIS — Z86.718 HISTORY OF DEEP VENOUS THROMBOSIS: Chronic | ICD-10-CM

## 2020-10-28 DIAGNOSIS — Z12.11 SPECIAL SCREENING FOR MALIGNANT NEOPLASMS, COLON: ICD-10-CM

## 2020-10-28 DIAGNOSIS — E11.42 DIABETIC POLYNEUROPATHY ASSOCIATED WITH TYPE 2 DIABETES MELLITUS (H): Chronic | ICD-10-CM

## 2020-10-28 DIAGNOSIS — I25.10 CORONARY ARTERY CALCIFICATION SEEN ON CAT SCAN: ICD-10-CM

## 2020-10-28 DIAGNOSIS — E11.21 TYPE 2 DIABETES MELLITUS WITH DIABETIC NEPHROPATHY, WITHOUT LONG-TERM CURRENT USE OF INSULIN (H): Chronic | ICD-10-CM

## 2020-10-28 DIAGNOSIS — J31.0 CHRONIC RHINITIS: Chronic | ICD-10-CM

## 2020-10-28 DIAGNOSIS — E78.5 HYPERLIPIDEMIA, UNSPECIFIED HYPERLIPIDEMIA TYPE: Chronic | ICD-10-CM

## 2020-10-28 DIAGNOSIS — G47.33 OSA (OBSTRUCTIVE SLEEP APNEA): Chronic | ICD-10-CM

## 2020-10-28 DIAGNOSIS — I87.2 VENOUS STASIS DERMATITIS OF BOTH LOWER EXTREMITIES: ICD-10-CM

## 2020-10-28 DIAGNOSIS — Z00.00 ROUTINE HISTORY AND PHYSICAL EXAMINATION OF ADULT: Primary | ICD-10-CM

## 2020-10-28 LAB
BASOPHILS # BLD AUTO: 0 10E9/L (ref 0–0.2)
BASOPHILS NFR BLD AUTO: 0.4 %
DIFFERENTIAL METHOD BLD: ABNORMAL
EOSINOPHIL # BLD AUTO: 0.1 10E9/L (ref 0–0.7)
EOSINOPHIL NFR BLD AUTO: 1.7 %
ERYTHROCYTE [DISTWIDTH] IN BLOOD BY AUTOMATED COUNT: 12.6 % (ref 10–15)
HBA1C MFR BLD: 7.2 % (ref 0–5.6)
HCT VFR BLD AUTO: 41.3 % (ref 40–53)
HGB BLD-MCNC: 13.3 G/DL (ref 13.3–17.7)
LYMPHOCYTES # BLD AUTO: 1.5 10E9/L (ref 0.8–5.3)
LYMPHOCYTES NFR BLD AUTO: 18 %
MCH RBC QN AUTO: 31.7 PG (ref 26.5–33)
MCHC RBC AUTO-ENTMCNC: 32.2 G/DL (ref 31.5–36.5)
MCV RBC AUTO: 98 FL (ref 78–100)
MONOCYTES # BLD AUTO: 0.8 10E9/L (ref 0–1.3)
MONOCYTES NFR BLD AUTO: 9.4 %
NEUTROPHILS # BLD AUTO: 5.8 10E9/L (ref 1.6–8.3)
NEUTROPHILS NFR BLD AUTO: 70.5 %
PLATELET # BLD AUTO: 179 10E9/L (ref 150–450)
PSA SERPL-ACNC: 1.71 UG/L (ref 0–4)
RBC # BLD AUTO: 4.2 10E12/L (ref 4.4–5.9)
WBC # BLD AUTO: 8.2 10E9/L (ref 4–11)

## 2020-10-28 PROCEDURE — 99214 OFFICE O/P EST MOD 30 MIN: CPT | Mod: 25 | Performed by: FAMILY MEDICINE

## 2020-10-28 PROCEDURE — 90682 RIV4 VACC RECOMBINANT DNA IM: CPT | Performed by: FAMILY MEDICINE

## 2020-10-28 PROCEDURE — 90471 IMMUNIZATION ADMIN: CPT | Performed by: FAMILY MEDICINE

## 2020-10-28 PROCEDURE — 36415 COLL VENOUS BLD VENIPUNCTURE: CPT | Performed by: FAMILY MEDICINE

## 2020-10-28 PROCEDURE — 99396 PREV VISIT EST AGE 40-64: CPT | Mod: 25 | Performed by: FAMILY MEDICINE

## 2020-10-28 PROCEDURE — 99207 ZZC ROUTINE FOOT CARE BY A PHYSICIAN OF A DIABETIC PATIENT WITH DIABETICC SENSORY: CPT | Mod: 25 | Performed by: FAMILY MEDICINE

## 2020-10-28 PROCEDURE — 80050 GENERAL HEALTH PANEL: CPT | Performed by: FAMILY MEDICINE

## 2020-10-28 PROCEDURE — G0103 PSA SCREENING: HCPCS | Performed by: FAMILY MEDICINE

## 2020-10-28 PROCEDURE — 87340 HEPATITIS B SURFACE AG IA: CPT | Performed by: FAMILY MEDICINE

## 2020-10-28 PROCEDURE — 82043 UR ALBUMIN QUANTITATIVE: CPT | Performed by: FAMILY MEDICINE

## 2020-10-28 PROCEDURE — 83036 HEMOGLOBIN GLYCOSYLATED A1C: CPT | Performed by: FAMILY MEDICINE

## 2020-10-28 PROCEDURE — 80061 LIPID PANEL: CPT | Performed by: FAMILY MEDICINE

## 2020-10-28 PROCEDURE — 86706 HEP B SURFACE ANTIBODY: CPT | Performed by: FAMILY MEDICINE

## 2020-10-28 RX ORDER — METFORMIN HCL 500 MG
1000 TABLET, EXTENDED RELEASE 24 HR ORAL 2 TIMES DAILY WITH MEALS
Qty: 360 TABLET | Refills: 3 | Status: SHIPPED | OUTPATIENT
Start: 2020-10-28

## 2020-10-28 RX ORDER — LISINOPRIL 20 MG/1
20 TABLET ORAL DAILY
Qty: 90 TABLET | Refills: 1 | Status: SHIPPED | OUTPATIENT
Start: 2020-10-28 | End: 2021-04-28

## 2020-10-28 ASSESSMENT — ENCOUNTER SYMPTOMS
MYALGIAS: 0
HEMATOCHEZIA: 0
DYSURIA: 0
FEVER: 0
CHILLS: 0
EYE PAIN: 0
FREQUENCY: 0
DIZZINESS: 0
NERVOUS/ANXIOUS: 0
ABDOMINAL PAIN: 0
WEAKNESS: 0
DIARRHEA: 0
NAUSEA: 0
ARTHRALGIAS: 0
JOINT SWELLING: 0
HEMATURIA: 0
PALPITATIONS: 0
HEARTBURN: 0
COUGH: 0
HEADACHES: 0
PARESTHESIAS: 0
SHORTNESS OF BREATH: 0
SORE THROAT: 0
CONSTIPATION: 0

## 2020-10-28 ASSESSMENT — ANXIETY QUESTIONNAIRES
7. FEELING AFRAID AS IF SOMETHING AWFUL MIGHT HAPPEN: NOT AT ALL
5. BEING SO RESTLESS THAT IT IS HARD TO SIT STILL: NOT AT ALL
1. FEELING NERVOUS, ANXIOUS, OR ON EDGE: NOT AT ALL
GAD7 TOTAL SCORE: 0
6. BECOMING EASILY ANNOYED OR IRRITABLE: NOT AT ALL
3. WORRYING TOO MUCH ABOUT DIFFERENT THINGS: NOT AT ALL
2. NOT BEING ABLE TO STOP OR CONTROL WORRYING: NOT AT ALL
IF YOU CHECKED OFF ANY PROBLEMS ON THIS QUESTIONNAIRE, HOW DIFFICULT HAVE THESE PROBLEMS MADE IT FOR YOU TO DO YOUR WORK, TAKE CARE OF THINGS AT HOME, OR GET ALONG WITH OTHER PEOPLE: NOT DIFFICULT AT ALL

## 2020-10-28 ASSESSMENT — PATIENT HEALTH QUESTIONNAIRE - PHQ9
5. POOR APPETITE OR OVEREATING: NOT AT ALL
SUM OF ALL RESPONSES TO PHQ QUESTIONS 1-9: 2

## 2020-10-28 ASSESSMENT — MIFFLIN-ST. JEOR: SCORE: 2392.04

## 2020-10-28 NOTE — PATIENT INSTRUCTIONS
Self testicular exam  Diabetes eye check due tomorrow  Diabetes foot exam  Check feet daily  Labs today   Will adjust meds if need to after labs reviewed  Meds has enough till can get labs back to review then refill  Continue compression socks, avoiding salt, elevating legs 1 hr daily above heart level  Monitor open sore two on right lower shin, no infection seen  Continue care with therapist psychiatrist, sleep, dermatology vascular, cardiology and urology as need  Flu shot yearly   Consider hep b vaccine if not immune will check status with labs today   Consider Shingrex: shingles vaccines (series of 2 shots 2 to 6 months apart that can cause couple days of flu like symptoms but is expensive so check with insurance and get at pharmacy where cheaper).  Monitor cough form allergies, use allergy med otc   Return in 6 months for recheck diabetes , HTN etc or earlier for any new concern    Preventive Health Recommendations  Male Ages 50 - 64    Yearly exam:             See your health care provider every year in order to  o   Review health changes.   o   Discuss preventive care.    o   Review your medicines if your doctor has prescribed any.     Have a cholesterol test every 5 years, or more frequently if you are at risk for high cholesterol/heart disease.     Have a diabetes test (fasting glucose) every three years. If you are at risk for diabetes, you should have this test more often.     Have a colonoscopy at age 50, or have a yearly FIT test (stool test). These exams will check for colon cancer.      Talk with your health care provider about whether or not a prostate cancer screening test (PSA) is right for you.    You should be tested each year for STDs (sexually transmitted diseases), if you re at risk.     Shots: Get a flu shot each year. Get a tetanus shot every 10 years.     Nutrition:    Eat at least 5 servings of fruits and vegetables daily.     Eat whole-grain bread, whole-wheat pasta and brown rice instead  of white grains and rice.     Get adequate Calcium and Vitamin D.     Lifestyle    Exercise for at least 150 minutes a week (30 minutes a day, 5 days a week). This will help you control your weight and prevent disease.     Limit alcohol to one drink per day.     No smoking.     Wear sunscreen to prevent skin cancer.     See your dentist every six months for an exam and cleaning.     See your eye doctor every 1 to 2 years.

## 2020-10-28 NOTE — RESULT ENCOUNTER NOTE
Rosey Mr. Vazquez,  Some of your results came back and are within acceptable limits. -PSA (prostate specific antigen) test is normal.  This indicates a low likelihood of prostate cancer.  ADVISE: rechecking this in 1 year.. If you have any further concerns please do not hesitate to contact us by message, phone or making an appointment.  Have a good day   Dr Chidi MCKEE

## 2020-10-28 NOTE — RESULT ENCOUNTER NOTE
Rosey Mr. Vazquez,  Some of your results came back and are within acceptable limits. -Normal red blood cell (hgb) levels, normal white blood cell count and normal platelet levels.. If you have any further concerns please do not hesitate to contact us by message, phone or making an appointment.  Have a good day   Dr Chidi MCKEE

## 2020-10-28 NOTE — NURSING NOTE

## 2020-10-28 NOTE — RESULT ENCOUNTER NOTE
Rosey Mr. Vazquez,  Your results came back and diabetes has gotten a bit worse. HBA1c now 7.2  Work on diet and exercise and weight loss and recommend rechecking in 3 to 6 months   Continue metformin as is    . If you have any further concerns please do not hesitate to contact us by message, phone or making an appointment.  Have a good day   Dr Chidi MCKEE

## 2020-10-28 NOTE — PROGRESS NOTES
SUBJECTIVE:   CC: Peterson Vazquez is an 59 year old male who presents for preventative health visit.       Patient has been advised of split billing requirements and indicates understanding: No  Healthy Habits:     Getting at least 3 servings of Calcium per day:  Yes    Bi-annual eye exam:  Yes    Dental care twice a year:  Yes    Sleep apnea or symptoms of sleep apnea:  Sleep apnea    Diet:  Regular (no restrictions)    Frequency of exercise:  None    Taking medications regularly:  Yes    Medication side effects:  None    PHQ-2 Total Score: 0    Additional concerns today:  No    BACKGROUND  Hx of morbid obesity, DM on asa & metformin, DM nephropathy DM polyneuropathy, microalbuminuria, HLD on Lipitor, HTN on lisinopril, coronary artery calcification seen on ct scan,  on asa, AMERICA on CPAP under care of sleep, chronic acquired lymphedema on compression socks, hx of prior DVT right peroneal proximal calf in 2016 on eliquis then 3 months as secondary to cellulitis at that time. Seen by hematology & recheck u/S after 3 months anticoagulation showed no DVT & advised no long term anticoagulation at that time. Hx of venous stasis dermatitis B/ LE,  previously treated with Kenalog & compression socks, rosacea on Metrogel under care of deerdrea, , chronic rhinitis, on loratadine,  severe MDD on Lexapro & Abilify under care of therapist & psyche, hx of nephrolithiasis, prior hernia repair, tooth extraction & tonsillectomy, on mv, vit d & B 12 & magnesium, & on turmeric.     Seen first by Dr Duffy in 1/2003 as a hospital follow [place] [facility] [place] & noted depression. On 2/2003 noted bad wave of anxiety , felt wanted to be dead & stopped Prozac & felt better. a 3/11/03 note stated He was seen on 10-3-02 with symptoms of depression and was started on Prozac 10 mg daily. Very quickly he felt anxious and at one point felt was walking through a tunnel and felt as though someone was stalking him. He realized this was not true. At another  time he felt almost suicidal. He believed both of these events were related to the Prozac and stopped the med. Since then he felt definitely less anxious though still depressed. The multiple stressors with which he presented in Oct. had escalated. He and his then wife had both been in counseling and had since , planning a divorce. At that point was started on Lexapro 10 mg & was also being evaluated for recurrent abdominal pain. Continued on Lexapro 10 mg all through 2004 & 2005. No mention of depression med in notes until 2007 note when mentioned to continue citalopram ( ? Typo) & was moving to [place].   Next seen 8/2010 at [facility] by nickie mota for a physical & noted then on tetracycline for rosacea & no longer noted to be on any antidepressant. Was just on a statin. In 2011 had an umbilical hernia surgery. In nov 2012 returned to dr Duffy for back pain & 1/30/13 reestablished care with dr Duffy. TCN no longer made/ covered & given Metrogel then. Noted also had a girlfriend at the time. In 9/2013 referred to sleep for snoring & fatigue. Seen by sleep Dr Montaño in 10/2013 & soon after that started on a CPAP. On 3/19/14 started on minocycline for rosacea. On 5/2015 seen by Dr Du for a physical & then seen 12/2015 transfer care to Dr Pradhan who was also primary for  his dad (brought in from [place] who had dementia & second wife was his primary care giver). On metformin by then. Noted care giver stress. In er 5/2/16 for kidney stones. In er admitted 6/12 to 6/14 for cellulitis right leg & associated DVT right peroneal vein. Seen for a physical 3/2017 by dr garcia. Seen by hematology Dr Chowdhury 3/2017, u/S was negative 4/2017 & eliquis discontinued as had taken for about 9 months by then. In er 6/2017 for kidney stones. Seen by [facility] for his sleep concerns,   Hx of most recent cellulitis was left lower leg in 1/2019, with prior strep & staph, hx of rosacea on minocycline in the past & on Metrogel,  chronic rhinitis on Claritin, hx of prior kidney stone noted stones in bladder & left kidney an ct 2017, prior tooth extraction, tonsillectomy, on vit d, B 12, magnesium, turmeric, intolerant to fluoxetine, normal stress test in 2011, seen by prior PCP Dr Pradhan 4/26/18 for a physical & noted then was on a keto diet. Admitted then 1/18 to 1/23/19 for cellulitis secondary O a laceration in left foot sole. U/S negative for DVT, Cx grew staph  GBS strep, evaluated by ID, HBA1c was 6.7 at the time, given vanc & zosyn then ceftriaxone then discharged on Augmentin & lymphedema treatment. Seen by Dr Woods 1/28 hospital follow up & doxycycline given. On 2/13 given keflex on patients insistence that cellulitis still there, had mostly venous stasis dermatitis. Seen 3/4 & no infection sen. given Metrogel for rosacea, LDL was elevated at 99 & cbc was normal. Did lymphedema treatment with good results & discharged wearing compressions socks from OT on 3/19    Seen 4/25/19  first time by this writer as PCP on maternity leave. Had severe depression, NETTIE & addressed dm, polyneuropathy, Morbid obesity, HTN , AMERICA ^ HLD. Lymphedema & venous stasis dermatitis & discoloration were under control with use of compression socks. Had no recurrence of prior DVT or kidney stones & discussed chronic abx use itz minocycline may not be wise specially given current low mood & referred to dermatology. Was restarted on Lexapro, referred to psyche & day treatment & continued with his psychologist. Labs done later showed elevated microalbumin, normal CMP, TSH, cbc, with mildly elevated HBA1c. Declined colonoscopy, was to do a FIT test & deferred Tdap & shingrex to another day.   Seen 5/9/19 for a physical & was feeling 75 % better. When started the Lexapro had one day of lethargy, was functioning and coping better. he plans to do more exercise & has joined the [employer] & has got a . He'd been going to therapy & felt competent and more  stable, was seeing his therapist once a week, been a couple times since started  To see psyche 5/30 & was on the wait list for 55 +day treatment at [place]. Thought felt would go through his own therapist Dr Elham Najera, as  concerned about the time commitment & difficulty with work, & employability. Was to see dermatology too at [facility].  exam declined. Had a  low sat and cough but cxr was normal. Diabetes  & BP was good. Reminded was due for a dm eye check. Continued on CPAP. Was to consider PSA another time & Tdap and shingles when wife got back from her trip. CTA chest done due to low SATs which was negative for PE. Did show coronary calcifications for which referred to cardiology. Seen by cardiology Dr Sotomayor on 6/13 and advised to increase atorvastatin to 80 and lisinopril to 20 and advised fasting lipids in 3 months. Seen by derm on 6/17 for rosacea, SK, & dermatitis & continued on current meds.  Seen 8/6 for HTN, BP was low, noted dizziness so advised to decrease lisinopril to 15, continued Lipitor, declined shingrex. Noted had lost 20 lbs with slimgeneics 7 DM was doing good. Seen by cardiology. Under care of sleep. Mood better on Abilify. PSA was normal. Lipids in control, CMP normal , bili mildly elevated possibly gilberts glucose was elevated. Seen by [facility] on 10/4 for AMERICA and continued on CPAP at 8 to 16 mmHg. Seen by OT on 10/16 & continued on compression socks.     Seen 11/8/19 for well controlled DM, BP and stable chronic issues. Labs done, meds refilled, Tdap given, discussed shingrex. Advised to follow up in 6 months but pandemic occurred.     HERE FOR PHSYICAL AND DIABETES FOLLOW UP    Dm: , doing terribly,   polyneuropathy  Obesity: unable to go to gym due to pandemic, sedentary, started working on diet few weeks  Microalbuminuria  HLD : on statin no side effects, arm a bit sore sore, working form home,   Coronary artery calcification ct scan  HTN BP good  MDD  To get , wife  [place] 2 [place]  pandemic  Job  Getting therapist and psychiatrist   AMERICA on CPAP  chronic lymphedema, no ulcers or new rash or pain, prior hx of DVT  Rosacea: on Metrogel by derm, not so bad  No kidney stone symptoms long time, added cucumbers to diet  Rhinitis not now, feels allergy related congestion, & cough but no fever, no loss of sense or smell. Feels well    Ok to get flu shot    Today's PHQ-2 Score:   PHQ-2 ( 1999 Pfizer) 10/28/2020   Q1: Little interest or pleasure in doing things 0   Q2: Feeling down, depressed or hopeless 0   PHQ-2 Score 0   Q1: Little interest or pleasure in doing things Not at all   Q2: Feeling down, depressed or hopeless Not at all   PHQ-2 Score 0     Abuse: Current or Past(Physical, Sexual or Emotional)- No  Do you feel safe in your environment? Yes    Social History     Tobacco Use     Smoking status: Never Smoker     Smokeless tobacco: Never Used   Substance Use Topics     Alcohol use: Yes     Alcohol/week: 0.0 standard drinks     Comment: 1 drink monthly     If you drink alcohol do you typically have >3 drinks per day or >7 drinks per week? No    Alcohol Use 10/28/2020   Prescreen: >3 drinks/day or >7 drinks/week? No   Prescreen: >3 drinks/day or >7 drinks/week? -   No flowsheet data found.    Last PSA:   PSA   Date Value Ref Range Status   08/06/2019 1.26 0 - 4 ug/L Final     Comment:     Assay Method:  Chemiluminescence using Siemens Vista analyzer       Reviewed orders with patient. Reviewed health maintenance and updated orders accordingly - Yes  Lab work is in process  Labs reviewed in EPIC  BP Readings from Last 3 Encounters:   10/28/20 126/84   11/08/19 122/66   08/06/19 108/60    Wt Readings from Last 3 Encounters:   10/28/20 (!) 153.3 kg (338 lb)   11/08/19 140.3 kg (309 lb 4 oz)   08/06/19 138.9 kg (306 lb 4 oz)                  Patient Active Problem List   Diagnosis     Hyperlipidemia     Chronic rhinitis     Morbid obesity (H)     Rosacea     Type 2  diabetes mellitus with diabetic nephropathy; goal HgbA1c < 7%     Essential hypertension; goal < 140/90     History of nephrolithiasis     AMERICA (obstructive sleep apnea)     Diabetic polyneuropathy associated with type 2 diabetes mellitus (H)     History of deep venous thrombosis right peroneal vein provoked by cellulitis     Chronic acquired lymphedema     Venous stasis dermatitis of both lower extremities     Severe episode of recurrent major depressive disorder, without psychotic features (H)     Microalbuminuria     Coronary artery calcification seen on CAT scan     Past Surgical History:   Procedure Laterality Date     HC TOOTH EXTRACTION W/FORCEP      WISDOM TEETH     HERNIA REPAIR      mesh     TONSILLECTOMY      TONSILS       Social History     Tobacco Use     Smoking status: Never Smoker     Smokeless tobacco: Never Used   Substance Use Topics     Alcohol use: Yes     Alcohol/week: 0.0 standard drinks     Comment: 1 drink monthly     Family History   Problem Relation Age of Onset     Cancer Mother         CA brain, lung, starte din tear duct,  age 52     Other Cancer Mother      Cardiovascular Father         CABG @ 74, periph. vas. disease     Alzheimer Disease Father      Chronic Obstructive Pulmonary Disease Father      Other - See Comments Father         GBS     Depression Father      Cardiovascular Brother         MI, chf dx 40,  mi age 50      Obesity Brother      Depression Other      Cancer - colorectal No family hx of          Current Outpatient Medications   Medication Sig Dispense Refill     ARIPiprazole (ABILIFY) 2 MG tablet Take 1 tablet (2 mg) by mouth daily       aspirin 81 MG EC tablet Take 1 tablet (81 mg) by mouth daily 90 tablet 3     atorvastatin (LIPITOR) 80 MG tablet Take 1 tablet (80 mg) by mouth daily DUE FOR APPOINTMENT 2020-NO FURTHER REFILLS 90 tablet 0     Blood Glucose Monitoring Suppl (BLOOD GLUCOSE METER) KIT 1 Device daily 1 kit 2     cholecalciferol  (VITAMIN D) 1000 UNIT tablet Take 1 tablet (1,000 Units) by mouth daily 100 tablet 3     Cyanocobalamin (B-12) 1000 MCG TBCR Take 1,000 mcg by mouth daily 100 tablet 1     escitalopram (LEXAPRO) 10 MG tablet TAKE 1 TABLET DAILY (FURTHER RECOMMENDATIONS BY PSYCHE) 90 tablet 0     lisinopril (ZESTRIL) 20 MG tablet Take 1 tablet (20 mg) by mouth daily DUE FOR APPOINTMENT September 2020-NO FURTHER REFILLS 90 tablet 0     loratadine (CLARITIN) 10 MG tablet Take 10 mg by mouth At Bedtime       magnesium gluconate (MAGONATE) 500 (27 Mg) MG tablet Take 500 mg by mouth At Bedtime       metFORMIN (GLUCOPHAGE-XR) 500 MG 24 hr tablet Take 2 tablets (1,000 mg) by mouth 2 times daily (with meals) 360 tablet 3     metroNIDAZOLE (METROGEL) 1 % external gel Apply topically daily 60 g 11     Multiple Vitamin (DAILY MULTIVITAMIN PO) Take 1 tablet by mouth At Bedtime        Turmeric (RA TURMERIC) 500 MG CAPS Take 1 capsule by mouth daily       Allergies   Allergen Reactions     Fluoxetine Hcl      Prozac: anxiety     Recent Labs   Lab Test 10/28/20  0920 11/08/19  0820 08/06/19  0927 05/31/19  0830 05/31/19  0830 04/25/19  0923 04/27/18  1027 04/27/18  1027 03/03/17  1012 03/03/17  1012   A1C 7.2* 5.7*  --   --  5.5 7.5*   < > 6.6*  --  7.5*   LDL  --   --  60  --  Cannot estimate LDL when triglyceride exceeds 400 mg/dL  79  --    < > 77  --  87   HDL  --   --  36*  --  34*  --   --  37*  --  44   TRIG  --   --  103  --  565*  --   --  305*  --  294*   ALT  --  31 33  --  37 43   < > 48  --  47   CR  --  0.83 0.98  --  0.85 0.92   < > 0.89   < > 0.91   GFRESTIMATED  --  >90 84   < > >90 90   < > 88   < > 85   GFRESTBLACK  --  >90 >90   < > >90 >90   < > >90   < > >90  African American GFR Calc     POTASSIUM  --  4.2 5.2  --  4.2 5.1   < > 4.3   < > 5.1   TSH  --   --   --   --   --  1.08  --   --   --  1.29    < > = values in this interval not displayed.        Reviewed and updated as needed this visit by clinical staff  Tobacco   "Allergies  Meds  Problems  Med Hx  Surg Hx  Fam Hx  Soc Hx          Reviewed and updated as needed this visit by Provider  Tobacco  Allergies  Meds  Problems  Med Hx  Surg Hx  Fam Hx  Soc Hx         Past Medical History:   Diagnosis Date     Cellulitis of left lower extremity 1/18/2019     Chronic acquired lymphedema 1/19/2019     Chronic rhinitis      Depressive disorder      DVT (deep venous thrombosis) (H)     R peroneal vein in 2016     History of depression 2003     HTN (hypertension) 4/30/2013     Morbid obesity (H)      Other and unspecified hyperlipidemia      Type II or unspecified type diabetes mellitus without mention of complication, not stated as uncontrolled 7-05      Past Surgical History:   Procedure Laterality Date     HC TOOTH EXTRACTION W/FORCEP      WISDOM TEETH     HERNIA REPAIR  2011    mesh     TONSILLECTOMY      TONSILS     OB History   No obstetric history on file.       Review of Systems   Constitutional: Negative for chills and fever.   HENT: Negative for congestion, ear pain, hearing loss and sore throat.    Eyes: Negative for pain and visual disturbance.   Respiratory: Negative for cough and shortness of breath.    Cardiovascular: Positive for peripheral edema. Negative for chest pain and palpitations.   Gastrointestinal: Negative for abdominal pain, constipation, diarrhea, heartburn, hematochezia and nausea.   Genitourinary: Negative for discharge, dysuria, frequency, genital sores, hematuria, impotence and urgency.   Musculoskeletal: Negative for arthralgias, joint swelling and myalgias.   Skin: Negative for rash.   Neurological: Negative for dizziness, weakness, headaches and paresthesias.   Psychiatric/Behavioral: Negative for mood changes. The patient is not nervous/anxious.      OBJECTIVE:   /84 (BP Location: Left arm, Patient Position: Sitting, Cuff Size: Adult Large)   Pulse 61   Temp 97.5  F (36.4  C) (Tympanic)   Resp 16   Ht 1.854 m (6' 1\")   Wt (!) " 153.3 kg (338 lb)   SpO2 97%   BMI 44.59 kg/m      Physical Exam  GENERAL: healthy, alert, no distress and obese  EYES: Eyes grossly normal to inspection, PERRL and conjunctivae and sclerae normal  HENT: ear canals and TM's normal, nose and mouth without ulcers or lesions  NECK: no adenopathy, no asymmetry, masses, or scars and thyroid normal to palpation  RESP: lungs clear to auscultation - no rales, rhonchi or wheezes  CV: regular rate and rhythm, normal S1 S2, no S3 or S4, no murmur, click or rub, no peripheral edema and peripheral pulses strong  ABDOMEN: soft, non tender, no hepatosplenomegaly, no masses and bowel sounds normal  MS: no gross musculoskeletal defects noted, no edema  SKIN: no suspicious lesions or rashes  NEURO: Normal strength and tone, mentation intact and speech normal  PSYCH: mentation appears normal, affect normal/bright  Diabetic foot exam: normal DP and PT pulses, normal sensory exam, normal monofilament exam, venous stasis dermatitis noted, ulceration at right anterior lower shin superficial 1/2 cm each x 2 circular lesions, no sign of infection, not weeping, not tender and nail exam dystrophic nails    Diagnostic Test Results:  Labs reviewed in Epic  Results for orders placed or performed in visit on 10/28/20   CBC with platelets differential     Status: Abnormal   Result Value Ref Range    WBC 8.2 4.0 - 11.0 10e9/L    RBC Count 4.20 (L) 4.4 - 5.9 10e12/L    Hemoglobin 13.3 13.3 - 17.7 g/dL    Hematocrit 41.3 40.0 - 53.0 %    MCV 98 78 - 100 fl    MCH 31.7 26.5 - 33.0 pg    MCHC 32.2 31.5 - 36.5 g/dL    RDW 12.6 10.0 - 15.0 %    Platelet Count 179 150 - 450 10e9/L    % Neutrophils 70.5 %    % Lymphocytes 18.0 %    % Monocytes 9.4 %    % Eosinophils 1.7 %    % Basophils 0.4 %    Absolute Neutrophil 5.8 1.6 - 8.3 10e9/L    Absolute Lymphocytes 1.5 0.8 - 5.3 10e9/L    Absolute Monocytes 0.8 0.0 - 1.3 10e9/L    Absolute Eosinophils 0.1 0.0 - 0.7 10e9/L    Absolute Basophils 0.0 0.0 - 0.2  10e9/L    Diff Method Automated Method    Hemoglobin A1c     Status: Abnormal   Result Value Ref Range    Hemoglobin A1C 7.2 (H) 0 - 5.6 %       ASSESSMENT/PLAN:       ICD-10-CM    1. Routine history and physical examination of adult  Z00.00 Fecal colorectal cancer screen FIT     Hepatitis B Surface Antibody     Hepatitis B surface antigen     C RIV4 (FLUBLOK) VACCINE RECOMBINANT DNA PRSRV ANTIBIO FREE, IM [[id number]]     ADMIN 1st VACCINE   2. Type 2 diabetes mellitus with diabetic nephropathy, without long-term current use of insulin (H)  E11.21 Albumin Random Urine Quantitative with Creat Ratio     Comprehensive metabolic panel     Hemoglobin A1c     TSH with free T4 reflex     Lipid panel reflex to direct LDL Fasting     Prostate spec antigen screen     ROUTINE FOOT CARE BY A PHYSICIAN OF A DIABETIC PATIENT WITH DIABETIC SENSORY   3. Diabetic polyneuropathy associated with type 2 diabetes mellitus (H)  E11.42 Albumin Random Urine Quantitative with Creat Ratio     Comprehensive metabolic panel     Hemoglobin A1c     TSH with free T4 reflex     Lipid panel reflex to direct LDL Fasting     ROUTINE FOOT CARE BY A PHYSICIAN OF A DIABETIC PATIENT WITH DIABETIC SENSORY   4. Morbid obesity (H)  E66.01 TSH with free T4 reflex     Lipid panel reflex to direct LDL Fasting   5. Microalbuminuria  R80.9 Albumin Random Urine Quantitative with Creat Ratio     Comprehensive metabolic panel     Hemoglobin A1c     TSH with free T4 reflex   6. Hyperlipidemia, unspecified hyperlipidemia type  E78.5 TSH with free T4 reflex     Lipid panel reflex to direct LDL Fasting   7. Coronary artery calcification seen on CAT scan  I25.10 TSH with free T4 reflex     Lipid panel reflex to direct LDL Fasting   8. Essential hypertension; goal < 140/90  I10 Albumin Random Urine Quantitative with Creat Ratio     Comprehensive metabolic panel     TSH with free T4 reflex     Lipid panel reflex to direct LDL Fasting   9. Severe episode of recurrent major  depressive disorder, without psychotic features (H)  F33.2 TSH with free T4 reflex   10. AMERICA (obstructive sleep apnea)  G47.33 CBC with platelets differential     TSH with free T4 reflex   11. Chronic acquired lymphedema  I89.0 TSH with free T4 reflex   12. Venous stasis dermatitis of both lower extremities  I87.2 CBC with platelets differential   13. Venous stasis ulcer of other part of right lower leg limited to breakdown of skin with varicose veins (H)  I83.018     L97.811    14. History of deep venous thrombosis right peroneal vein provoked by cellulitis  Z86.718    15. Rosacea  L71.9    16. History of nephrolithiasis  Z87.442 CBC with platelets differential     Comprehensive metabolic panel   17. Chronic rhinitis  J31.0 CBC with platelets differential   18. Special screening for malignant neoplasms, colon  Z12.11 Fecal colorectal cancer screen FIT   19. Screening for prostate cancer  Z12.5 Prostate spec antigen screen   20. Immunity status testing  Z01.84 Hepatitis B Surface Antibody     Hepatitis B surface antigen     Self testicular exam  Diabetes with nephropathy/ neuropathy slight worse. HBa1c up to 7.8. work on diet, exercise, weight loss and recheck in 3 to 6 months. Continue metformin as is. If not better will refer to dm ed/ endocrine/ mtm. Diabetes eye check due tomorrow. Diabetes foot exam done. Able to feel sensations has chronic dystrophic nails. Has a stasis ulcer right shin does not look infected. Monitor closely. Check feet daily. Labs today. Will adjust meds if need to after labs reviewed. Meds has enough till can get labs back to review then refill.    BMI> 44, has had some weight gain. Discussed diet, lifestyle, exercise briefly. Been more sedentary since pandemic and family stress also playing a role.    Micro albuminemia on ACE inhibitor. Check abs today and refilled med    HLD on statin, awaiting lipids to see if atorvastatin needs adjustment  Coronary calcification on ct scan, follow up  with cardiology as needed. Diet, exercise, weight loss, control dm and risk factor modification is bryson    MDD in remission on Lexapro and Abilify managed by psychiatrist and sees therapist regularly. Going through divorce proceedings currently.    AMERICA sleep apnea on CPAP. Continue care with sleep.    Chronic lymphedema/ venous stasis. Two shallow ulcers right lower shin. No sign of infection. Had not been using compression socks a while and gained weight recently likely contributing. Encouraged to continue compression socks recently restarted, avoiding salt, elevating legs 1 hr daily above heart level. Monitor open sore two on right lower shin, no infection seen. No clinical evidence of recurrent DVT.     Rosacea improved, on Metrogel prn under care of derm  Hx of nephrolithiasis no symptoms currently. Follow up with urology prn.   Chronic rhinitis, feels allergy symptoms advised cannot sometimes differentiate between allergies , cold and covid. Monitor for symptoms. Feels well and only found this out end of visit so couldn't use full PPE for apt, try an OTC allergy pill discussed verbally.     Continue care with therapist psychiatrist, sleep, dermatology vascular, cardiology and urology as need  Flu shot yearly given today.   Consider hep B vaccine if not immune will check status with labs today   Consider Shingrex: shingles vaccines (series of 2 shots 2 to 6 months apart that can cause couple days of flu like symptoms but is expensive so check with insurance and get at pharmacy where cheaper).  Return in 6 months for recheck diabetes , HTN etc or earlier for any new concern    Patient has been advised of split billing requirements and indicates understanding: Yes  COUNSELING:   Reviewed preventive health counseling, as reflected in patient instructions       Regular exercise       Healthy diet/nutrition       Vision screening       Hearing screening       Immunizations    Vaccinated for: Influenza         Alcohol  "Use       Colon cancer screening       Prostate cancer screening       The ASCVD Risk score (Josué VEGA Jr., et al., 2013) failed to calculate for the following reasons:    The valid total cholesterol range is 130 to 320 mg/dL       Advance Care Planning    Estimated body mass index is 44.59 kg/m  as calculated from the following:    Height as of this encounter: 1.854 m (6' 1\").    Weight as of this encounter: 153.3 kg (338 lb).     Weight management plan: Discussed healthy diet and exercise guidelines    He reports that he has never smoked. He has never used smokeless tobacco.      Counseling Resources:  ATP IV Guidelines  Pooled Cohorts Equation Calculator  FRAX Risk Assessment  ICSI Preventive Guidelines  Dietary Guidelines for Americans, 2010  USDA's MyPlate  ASA Prophylaxis  Lung CA Screening    Layla Murillo MD  [facility]    "

## 2020-10-29 ENCOUNTER — TRANSFERRED RECORDS (OUTPATIENT)
Dept: HEALTH INFORMATION MANAGEMENT | Facility: CLINIC | Age: 60
End: 2020-10-29

## 2020-10-29 LAB
ALBUMIN SERPL-MCNC: 3.7 G/DL (ref 3.4–5)
ALP SERPL-CCNC: 116 U/L (ref 40–150)
ALT SERPL W P-5'-P-CCNC: 81 U/L (ref 0–70)
ANION GAP SERPL CALCULATED.3IONS-SCNC: 6 MMOL/L (ref 3–14)
AST SERPL W P-5'-P-CCNC: 31 U/L (ref 0–45)
BILIRUB SERPL-MCNC: 1.3 MG/DL (ref 0.2–1.3)
BUN SERPL-MCNC: 19 MG/DL (ref 7–30)
CALCIUM SERPL-MCNC: 8.9 MG/DL (ref 8.5–10.1)
CHLORIDE SERPL-SCNC: 105 MMOL/L (ref 94–109)
CHOLEST SERPL-MCNC: 170 MG/DL
CO2 SERPL-SCNC: 27 MMOL/L (ref 20–32)
CREAT SERPL-MCNC: 0.83 MG/DL (ref 0.66–1.25)
GFR SERPL CREATININE-BSD FRML MDRD: >90 ML/MIN/{1.73_M2}
GLUCOSE SERPL-MCNC: 185 MG/DL (ref 70–99)
HBV SURFACE AB SERPL IA-ACNC: 0.72 M[IU]/ML
HBV SURFACE AG SERPL QL IA: NONREACTIVE
HDLC SERPL-MCNC: 45 MG/DL
LDLC SERPL CALC-MCNC: 77 MG/DL
NONHDLC SERPL-MCNC: 125 MG/DL
POTASSIUM SERPL-SCNC: 4.4 MMOL/L (ref 3.4–5.3)
PROT SERPL-MCNC: 6.7 G/DL (ref 6.8–8.8)
RETINOPATHY: NEGATIVE
SODIUM SERPL-SCNC: 138 MMOL/L (ref 133–144)
TRIGL SERPL-MCNC: 239 MG/DL
TSH SERPL DL<=0.005 MIU/L-ACNC: 1.76 MU/L (ref 0.4–4)

## 2020-10-29 ASSESSMENT — ANXIETY QUESTIONNAIRES: GAD7 TOTAL SCORE: 0

## 2020-10-29 NOTE — RESULT ENCOUNTER NOTE
Rosey Vazquez,  Your results came back and are within acceptable limits. -Triglycerides are elevated which can increase your heart disease risk.  A diet high in fat and simple carbohydrates, genetics and being overweight can contribute to this.  Your LDL(bad) cholesterol and HDL(good) cholesterol levels are normal.  ADVISE: exercising 150 minutes of aerobic exercise per week (30 minutes 5 days per week or 50 minutes 3 days per week are options), weight control, and omega-3 fatty acids (fish oil) 7308-8430 mg daily are helpful to improve this.  In 6 months, you should recheck your fasting cholesterol panel by scheduling a lab-only appointment.  -TSH (thyroid stimulating hormone) level is normal which indicates normal thyroid function.. If you have any further concerns please do not hesitate to contact us by message, phone or making an appointment.  Have a good day   Dr Chidi MCKEE

## 2020-10-29 NOTE — RESULT ENCOUNTER NOTE
Ninijeronimo Mr. Vazquez,  Your results came back showing elevated triglycerides , glucose & some liver tests from weight gain & decreased sugar control. You are not immune to hep b by tests so can consider hep b vaccination series  . If you have any further concerns please do not hesitate to contact us by message, phone or making an appointment.  Have a good day   Dr Chidi MCKEE

## 2020-10-30 LAB
CREAT UR-MCNC: 144 MG/DL
MICROALBUMIN UR-MCNC: 973 MG/L
MICROALBUMIN/CREAT UR: 675.69 MG/G CR (ref 0–17)

## 2020-10-30 NOTE — RESULT ENCOUNTER NOTE
Rosey Mr. Vazquez,  Your results came back and show worsening effect of diabetes on your kidney function.    -Microalbumin (urine protein) level is elevated. This is suggestive of early damage to your kidneys from high blood pressure and diabetes.  ADVISE: avoiding anti-inflamatory agents such as ibuprofen (Advil, Motrin) or naproxen (Aleve) as much as possible, keeping your blood pressure in a normal range, and continuing your medication (lisinopril) that helps protect your kidneys.  Also, this should be rechecked in 1 year. . If you have any further concerns please do not hesitate to contact us by message, phone or making an appointment.  Have a good day   Dr Chidi MCKEE

## 2020-11-18 DIAGNOSIS — E11.21 TYPE 2 DIABETES MELLITUS WITH DIABETIC NEPHROPATHY, WITHOUT LONG-TERM CURRENT USE OF INSULIN (H): Chronic | ICD-10-CM

## 2020-11-21 RX ORDER — ATORVASTATIN CALCIUM 80 MG/1
TABLET, FILM COATED ORAL
Qty: 90 TABLET | Refills: 3 | Status: SHIPPED | OUTPATIENT
Start: 2020-11-21

## 2020-11-21 NOTE — TELEPHONE ENCOUNTER
Prescription approved per [facility] Refill Protocol.  Charlette Gomez RN

## 2020-12-17 ENCOUNTER — NURSE TRIAGE (OUTPATIENT)
Dept: FAMILY MEDICINE | Facility: CLINIC | Age: 60
End: 2020-12-17

## 2020-12-17 NOTE — TELEPHONE ENCOUNTER
"Patient calling with dizziness, vertigo. Also experiencing nausea with it. Gets cold, clammy skin when experiencing the vertigo. Is a diabetic, does not check blood sugar very often. Asked him to check it now but his machine is charging so cannot check it per patient. Has to hold onto the walls to walk. Hx fainting 2 years ago, does not feel like is going to pass out now. Feels fine when laying down, symptoms worse when upright. Feels like things are \"going forward\". Having difficulty walking. Unsure of BP and temp, does not have thermometer or BP machine. Symptoms started 10-15 minutes after getting up this AM. Patient also mentions concern for stroke. See below for more details.     Advised ER but patient is declining at this point, wants to \"give it sometime\". He would like to get PCPs advisement.      Additional Information    Negative: Shock suspected (e.g., cold/pale/clammy skin, too weak to stand, low BP, rapid pulse)    Negative: Difficult to awaken or acting confused (e.g., disoriented, slurred speech)    Negative: Fainted, and still feels dizzy afterwards    Negative: Severe difficulty breathing (e.g., struggling for each breath, speaks in single words)    Negative: Overdose (accidental or intentional) of medications    Negative: New neurologic deficit that is present now: * Weakness of the face, arm, or leg on one side of the body * Numbness of the face, arm, or leg on one side of the body * Loss of speech or garbled speech    Negative: Heart beating < 50 beats per minute OR > 140 beats per minute    Negative: Sounds like a life-threatening emergency to the triager    SEVERE dizziness (e.g., unable to stand, requires support to walk, feels like passing out now)    Answer Assessment - Initial Assessment Questions  1. DESCRIPTION: \"Describe your dizziness.\"      Feels like are going forward, feels like room is spinning when moving, fine when sitting still or lying down. Being upright is definitely worse  2. " "LIGHTHEADED: \"Do you feel lightheaded?\" (e.g., somewhat faint, woozy, weak upon standing)      Not really  3. VERTIGO: \"Do you feel like either you or the room is spinning or tilting?\" (i.e. vertigo)      yes  4. SEVERITY: \"How bad is it?\"  \"Do you feel like you are going to faint?\" \"Can you stand and walk?\"    - MILD - walking normally    - MODERATE - interferes with normal activities (e.g., work, school)     - SEVERE - unable to stand, requires support to walk, feels like passing out now.       Not feeling faint, able to walk but is more difficult  5. ONSET:  \"When did the dizziness begin?\"      10-15 minutes after getting up this morning started feeling dizzy and like things are \"going forward\"  6. AGGRAVATING FACTORS: \"Does anything make it worse?\" (e.g., standing, change in head position)      Being upright is worse, laying down does not feel it at all  7. HEART RATE: \"Can you tell me your heart rate?\" \"How many beats in 15 seconds?\"  (Note: not all patients can do this)        69 per his watch  8. CAUSE: \"What do you think is causing the dizziness?\"      unsure  9. RECURRENT SYMPTOM: \"Have you had dizziness before?\" If so, ask: \"When was the last time?\" \"What happened that time?\"      No but has fainted once a couple years ago after getting up too fast and went spinning  10. OTHER SYMPTOMS: \"Do you have any other symptoms?\" (e.g., fever, chest pain, vomiting, diarrhea, bleeding)        nausea  11. PREGNANCY: \"Is there any chance you are pregnant?\" \"When was your last menstrual period?\"        n/a    Protocols used: DIZZINESS-A-[place]    "

## 2020-12-17 NOTE — TELEPHONE ENCOUNTER
Writer called patient and reviewed message per JOSE ARMANDO aPrk PA-C.    Patient verbalized understanding and in agreement with plan.    Writer recommended patient not drive self to Emergency Room.  Patient agreed.    REG Caicedo, RN

## 2020-12-17 NOTE — TELEPHONE ENCOUNTER
He should be seen (most appropriate would be ED).  He does have risk factors for stroke.   It's possible his blood sugars and or BP are low. Also could be a positional vertigo but hard to differentiate these things from more serious conditions like stroke without being seen.  Thanks  Ezekiel Park

## 2021-01-13 DIAGNOSIS — Z12.11 SPECIAL SCREENING FOR MALIGNANT NEOPLASMS, COLON: ICD-10-CM

## 2021-01-13 DIAGNOSIS — Z00.00 ROUTINE HISTORY AND PHYSICAL EXAMINATION OF ADULT: ICD-10-CM

## 2021-01-13 PROCEDURE — 82274 ASSAY TEST FOR BLOOD FECAL: CPT | Performed by: FAMILY MEDICINE

## 2021-01-15 LAB — HEMOCCULT STL QL IA: NEGATIVE

## 2021-01-16 NOTE — RESULT ENCOUNTER NOTE
Rosey Mr. Vazquez,  Your results came back and are within acceptable limits. -FIT test (screening test for colon cancer) was normal. ADVISE: rechecking this test in 1 year.. If you have any further concerns please do not hesitate to contact us by message, phone or making an appointment.  Have a good day   Dr Chidi MCKEE

## 2021-03-03 ENCOUNTER — OFFICE VISIT (OUTPATIENT)
Dept: FAMILY MEDICINE | Facility: CLINIC | Age: 61
End: 2021-03-03
Payer: COMMERCIAL

## 2021-03-03 ENCOUNTER — ANCILLARY PROCEDURE (OUTPATIENT)
Dept: GENERAL RADIOLOGY | Facility: CLINIC | Age: 61
End: 2021-03-03
Attending: FAMILY MEDICINE
Payer: COMMERCIAL

## 2021-03-03 VITALS
TEMPERATURE: 99.4 F | OXYGEN SATURATION: 91 % | DIASTOLIC BLOOD PRESSURE: 62 MMHG | HEIGHT: 73 IN | HEART RATE: 76 BPM | WEIGHT: 315 LBS | BODY MASS INDEX: 41.75 KG/M2 | SYSTOLIC BLOOD PRESSURE: 123 MMHG

## 2021-03-03 DIAGNOSIS — R79.81 BORDERLINE LOW OXYGEN SATURATION LEVEL: ICD-10-CM

## 2021-03-03 DIAGNOSIS — Z91.09 ENVIRONMENTAL ALLERGIES: ICD-10-CM

## 2021-03-03 DIAGNOSIS — E66.01 MORBID OBESITY (H): Chronic | ICD-10-CM

## 2021-03-03 DIAGNOSIS — R42 DIZZINESS: ICD-10-CM

## 2021-03-03 DIAGNOSIS — R80.9 MICROALBUMINURIA: ICD-10-CM

## 2021-03-03 DIAGNOSIS — E11.42 DIABETIC POLYNEUROPATHY ASSOCIATED WITH TYPE 2 DIABETES MELLITUS (H): Chronic | ICD-10-CM

## 2021-03-03 DIAGNOSIS — E78.5 HYPERLIPIDEMIA, UNSPECIFIED HYPERLIPIDEMIA TYPE: Chronic | ICD-10-CM

## 2021-03-03 DIAGNOSIS — Z86.718 HISTORY OF DEEP VENOUS THROMBOSIS: Chronic | ICD-10-CM

## 2021-03-03 DIAGNOSIS — F33.2 SEVERE EPISODE OF RECURRENT MAJOR DEPRESSIVE DISORDER, WITHOUT PSYCHOTIC FEATURES (H): ICD-10-CM

## 2021-03-03 DIAGNOSIS — R09.82 POST-NASAL DRIP: ICD-10-CM

## 2021-03-03 DIAGNOSIS — J30.2 SEASONAL ALLERGIC RHINITIS, UNSPECIFIED TRIGGER: ICD-10-CM

## 2021-03-03 DIAGNOSIS — E11.21 TYPE 2 DIABETES MELLITUS WITH DIABETIC NEPHROPATHY, WITHOUT LONG-TERM CURRENT USE OF INSULIN (H): Primary | Chronic | ICD-10-CM

## 2021-03-03 DIAGNOSIS — G47.33 OSA (OBSTRUCTIVE SLEEP APNEA): Chronic | ICD-10-CM

## 2021-03-03 DIAGNOSIS — I87.2 VENOUS STASIS DERMATITIS OF BOTH LOWER EXTREMITIES: ICD-10-CM

## 2021-03-03 DIAGNOSIS — I10 ESSENTIAL HYPERTENSION: Chronic | ICD-10-CM

## 2021-03-03 DIAGNOSIS — I89.0 CHRONIC ACQUIRED LYMPHEDEMA: Chronic | ICD-10-CM

## 2021-03-03 DIAGNOSIS — I25.10 CORONARY ARTERY CALCIFICATION SEEN ON CAT SCAN: ICD-10-CM

## 2021-03-03 DIAGNOSIS — N52.9 VASCULOGENIC ERECTILE DYSFUNCTION, UNSPECIFIED VASCULOGENIC ERECTILE DYSFUNCTION TYPE: ICD-10-CM

## 2021-03-03 LAB
ALBUMIN SERPL-MCNC: 3.9 G/DL (ref 3.4–5)
ALP SERPL-CCNC: 87 U/L (ref 40–150)
ALT SERPL W P-5'-P-CCNC: 53 U/L (ref 0–70)
ANION GAP SERPL CALCULATED.3IONS-SCNC: 8 MMOL/L (ref 3–14)
AST SERPL W P-5'-P-CCNC: 33 U/L (ref 0–45)
BILIRUB SERPL-MCNC: 3.1 MG/DL (ref 0.2–1.3)
BUN SERPL-MCNC: 24 MG/DL (ref 7–30)
CALCIUM SERPL-MCNC: 9.2 MG/DL (ref 8.5–10.1)
CHLORIDE SERPL-SCNC: 110 MMOL/L (ref 94–109)
CHOLEST SERPL-MCNC: 107 MG/DL
CO2 SERPL-SCNC: 22 MMOL/L (ref 20–32)
CREAT SERPL-MCNC: 1.02 MG/DL (ref 0.66–1.25)
CREAT UR-MCNC: 153 MG/DL
GFR SERPL CREATININE-BSD FRML MDRD: 78 ML/MIN/{1.73_M2}
GLUCOSE SERPL-MCNC: 181 MG/DL (ref 70–99)
HBA1C MFR BLD: 4.1 % (ref 0–5.6)
HDLC SERPL-MCNC: 39 MG/DL
LDLC SERPL CALC-MCNC: 34 MG/DL
MICROALBUMIN UR-MCNC: 140 MG/L
MICROALBUMIN/CREAT UR: 91.5 MG/G CR (ref 0–17)
NONHDLC SERPL-MCNC: 68 MG/DL
POTASSIUM SERPL-SCNC: 4.3 MMOL/L (ref 3.4–5.3)
PROT SERPL-MCNC: 6.9 G/DL (ref 6.8–8.8)
SODIUM SERPL-SCNC: 140 MMOL/L (ref 133–144)
TRIGL SERPL-MCNC: 171 MG/DL

## 2021-03-03 PROCEDURE — 82043 UR ALBUMIN QUANTITATIVE: CPT | Performed by: FAMILY MEDICINE

## 2021-03-03 PROCEDURE — 83036 HEMOGLOBIN GLYCOSYLATED A1C: CPT | Performed by: FAMILY MEDICINE

## 2021-03-03 PROCEDURE — 36415 COLL VENOUS BLD VENIPUNCTURE: CPT | Performed by: FAMILY MEDICINE

## 2021-03-03 PROCEDURE — 71046 X-RAY EXAM CHEST 2 VIEWS: CPT | Performed by: RADIOLOGY

## 2021-03-03 PROCEDURE — 99215 OFFICE O/P EST HI 40 MIN: CPT | Performed by: FAMILY MEDICINE

## 2021-03-03 PROCEDURE — 80061 LIPID PANEL: CPT | Performed by: FAMILY MEDICINE

## 2021-03-03 PROCEDURE — 80053 COMPREHEN METABOLIC PANEL: CPT | Performed by: FAMILY MEDICINE

## 2021-03-03 ASSESSMENT — MIFFLIN-ST. JEOR: SCORE: 2304.48

## 2021-03-03 ASSESSMENT — ANXIETY QUESTIONNAIRES
7. FEELING AFRAID AS IF SOMETHING AWFUL MIGHT HAPPEN: NOT AT ALL
4. TROUBLE RELAXING: NOT AT ALL
GAD7 TOTAL SCORE: 0
7. FEELING AFRAID AS IF SOMETHING AWFUL MIGHT HAPPEN: NOT AT ALL
3. WORRYING TOO MUCH ABOUT DIFFERENT THINGS: NOT AT ALL
GAD7 TOTAL SCORE: 0
2. NOT BEING ABLE TO STOP OR CONTROL WORRYING: NOT AT ALL
6. BECOMING EASILY ANNOYED OR IRRITABLE: NOT AT ALL
1. FEELING NERVOUS, ANXIOUS, OR ON EDGE: NOT AT ALL
GAD7 TOTAL SCORE: 0
5. BEING SO RESTLESS THAT IT IS HARD TO SIT STILL: NOT AT ALL

## 2021-03-03 ASSESSMENT — PATIENT HEALTH QUESTIONNAIRE - PHQ9
SUM OF ALL RESPONSES TO PHQ QUESTIONS 1-9: 0
10. IF YOU CHECKED OFF ANY PROBLEMS, HOW DIFFICULT HAVE THESE PROBLEMS MADE IT FOR YOU TO DO YOUR WORK, TAKE CARE OF THINGS AT HOME, OR GET ALONG WITH OTHER PEOPLE: NOT DIFFICULT AT ALL
SUM OF ALL RESPONSES TO PHQ QUESTIONS 1-9: 0

## 2021-03-03 NOTE — PATIENT INSTRUCTIONS
Labs today shows diabetes improved , dont correlate with fingerstick's, recheck in 3 months  As  am sugars over 130 consistently referred to [facility] pharamcist   Meds utd   sats remain low borderline  Due to chronic allergies, post nasal drip, intermittent cough many years and hx of sleep apnea see pulmonary  cxr today   Due to hx of dizziness and coronary calcifications see cardiology  Go to the ER if having dizziness, chest pain, trouble breathing etc  Check sugars and BP next time gets dizzy  Rule out cardiac causes before getting script for viagra as can worsen dizzness  Continue wearing Compression socks  Consider hep b vaccine as not immune ( series of 3)   Consider shingles vaccines ( series of 2 )   May wait to get these until after gets covid vaccine so as not to jeopardize that   Continue care with therapy, psychiatrist, vascular, derm, eye, sleep & urology as needed  Return in 3 months for recheck and in 6 months for a physical

## 2021-03-03 NOTE — RESULT ENCOUNTER NOTE
Rosey Mr. Vazquez,  Your results came back with a normal chest xray. Plan as discussed in the visit today. If you have any further concerns please do not hesitate to contact us by message, phone or making an appointment.  Have a good day   Dr Chidi MCKEE

## 2021-03-03 NOTE — PROGRESS NOTES
Assessment & Plan     Type 2 diabetes mellitus with diabetic nephropathy, without long-term current use of insulin & with polyneuropathy, HTN, CKD, HLD etc.   DM much better HBa1c went from 7.2 to 4.1. but home sugars are not correlating with, still over 140 in am. DM eye check utd. Feet are doing ok. On asa, statin, ace, & metformin. Willing to see MTM given discrepancy in numbers. Referral placed. Continues to work on diet and exercise.  Microalbuminuria on an ACE, lab from today pending  HTN stable on lisinopril, has enough refills. I no answer for chronic intermittent cough, consider switching to an ARB to see if would make a difference.  HLD on high dose atorvastatin, no side effects, lipids pending  Morbid Obesity: working actively to loose weight since oct exercising and lowering carb's with a friend who is a newly diagnosed diabetic and his BMI went from 44 to 42. Feels lost about 20 to 33 pounds. Congratulated.   Called 12/17 about being dizzy. Was advised to go to the ER but didn't. Resolved on its own and after cleaned ears of wax. Did not have ability to check BP then, planning to get a home BP kit soon. Checking sugars frequently now but not when it happened.   Hx of coronary calcifications on prior Ct done for low sat noted incidentally in the past. Seen by cardiology and on high intensity statin. Denies any chest pain with activity.  Noted today incidental borderline low Sat of 89 %, after took deep breaths and cleared collected phlegm in throat / upper airways it went to 91 %.  Does have environmental allergies & on an allergy pill daily, does have chronic post nasal drip and chronic runny nose and has AMERICA on CPAP managed by [facility]. Hx of prior provoked DVT when had cellulitis in leg few years ago. CT done for similar finding in 2019 showed no PE . Reports no current leg swelling asymmetry or pain and edema much improved with weight loss and use of compression socks. Hx of intermittent cough,  more when in his basement, has been past yr working out of his home, no cough in past week. No symptoms now. On allergy pills all year long. No chest pain with activity. Consider changing ACE to ARB if cough work up reveals nothing  Due to borderline low SATs, chronic allergies, post nasal drip, intermittent cough many years and hx of sleep apnea advised to see pulmonary. Covid not suspected given duration of symptoms that preceded Covid onset. cxr today & exam otherwise lizy.   Due to hx of dizziness and coronary calcifications advised to see cardiology  Go to the ER if having dizziness, chest pain, trouble breathing etc  Check sugars and BP next time gets dizzy  Rule out cardiac causes before getting script for Viagra as can worsen dizziness  Legs less puffy. Needs to be more diligent of compression socks. Ulcer gone  MDD stable on med under care of therapy and psyche. Notes now officially   & seeing someone and may need to discuss Viagra given hx of ED. Discussed side effects. Willing to wait until work up for heart completed.  Consider hep B vaccine as not immune ( series of 3)   Consider shingles vaccines ( series of 2 )   May wait to get these until after gets Covid vaccine so as not to jeopardize that   Continue care with therapy, psychiatrist, vascular, derm, eye, sleep & urology as needed  Return in 3 months for recheck and in 6 months for a physical   - Hemoglobin A1c  - Albumin Random Urine Quantitative with Creat Ratio  - Comprehensive metabolic panel  - Lipid panel reflex to direct LDL Fasting  - MED THERAPY MANAGE REFERRAL    Diabetic polyneuropathy associated with type 2 diabetes mellitus (H)  Type 2 diabetes mellitus with diabetic nephropathy, without long-term current use of insulin & with polyneuropathy, HTN, CKD, HLD etc.   DM much better HBa1c went from 7.2 to 4.1. but home sugars are not correlating with, still over 140 in am. DM eye check utd. Feet are doing ok. On asa, statin, ace, &  metformin. Willing to see MTM given discrepancy in numbers. Referral placed. Continues to work on diet and exercise.  - Hemoglobin A1c  - Albumin Random Urine Quantitative with Creat Ratio  - Comprehensive metabolic panel  - Lipid panel reflex to direct LDL Fasting  - MED THERAPY MANAGE REFERRAL    Microalbuminuria  Microalbuminuria on an ACE, lab from today pending  - Hemoglobin A1c  - Albumin Random Urine Quantitative with Creat Ratio  - Comprehensive metabolic panel    Essential hypertension; goal < 140/90  HTN stable on lisinopril, has enough refills. I no answer for chronic intermittent cough, consider switching to an ARB to see if would make a difference.  - Albumin Random Urine Quantitative with Creat Ratio  - Comprehensive metabolic panel  - Lipid panel reflex to direct LDL Fasting    Hyperlipidemia, unspecified hyperlipidemia type  HLD on high dose atorvastatin, no side effects, lipids pending.  - Lipid panel reflex to direct LDL Fasting    Morbid obesity (H)  Morbid Obesity: working actively to loose weight since oct exercising and lowering carb's with a friend who is a newly diagnosed diabetic and his BMI went from 44 to 42. Feels lost about 20 to 33 pounds. Congratulated.   - Hemoglobin A1c  - Comprehensive metabolic panel  - Lipid panel reflex to direct LDL Fasting    Dizziness  Not currently. Called 12/17 about being dizzy. Was advised to go to the ER but didn't. Resolved on its own and after cleaned ears of wax. Did not have ability to check BP then, planning to get a home BP kit soon. Checking sugars frequently now but not when it happened.   Hx of coronary calcifications on prior Ct done for low sat noted incidentally in the past. Seen by cardiology and on high intensity statin. Denies any chest pain with activity.  - CARDIOLOGY EVAL ADULT REFERRAL; Future    Coronary artery calcification seen on CAT scan  - Lipid panel reflex to direct LDL Fasting  - CARDIOLOGY EVAL ADULT REFERRAL;  Future    Borderline low oxygen saturation level  Noted today incidental borderline low Sat of 89 %, after took deep breaths and cleared collected phlegm in throat / upper airways it went to 91 %.  Does have environmental allergies & on an allergy pill daily, does have chronic post nasal drip and chronic runny nose and has AMERICA on CPAP managed by [facility]. Hx of prior provoked DVT when had cellulitis in leg few years ago. CT done for similar finding in 2019 showed no PE . Reports no current leg swelling asymmetry or pain and edema much improved with weight loss and use of compression socks. Hx of intermittent cough, more when in his basement, has been past yr working out of his home, no cough in past week. No symptoms now. On allergy pills all year long. No chest pain with activity. Consider changing ACE to ARB if cough work up reveals nothing  Due to borderline low SATs, chronic allergies, post nasal drip, intermittent cough many years and hx of sleep apnea advised to see pulmonary. Covid not suspected given duration of symptoms that preceded Covid onset. cxr today & exam otherwise lizy.   Due to hx of dizziness and coronary calcifications advised to see cardiology  Go to the ER if having dizziness, chest pain, trouble breathing etc  Check sugars and BP next time gets dizzy  Rule out cardiac causes before getting script for Viagra as can worsen dizziness  - CARDIOLOGY EVAL ADULT REFERRAL; Future  - PULMONARY MEDICINE REFERRAL  - XR Chest 2 Views; Future    Environmental allergies  - PULMONARY MEDICINE REFERRAL    Post-nasal drip  - PULMONARY MEDICINE REFERRAL    Seasonal allergic rhinitis, unspecified trigger  - PULMONARY MEDICINE REFERRAL    AMERICA (obstructive sleep apnea)  On CPAP managed by [facility]    History of deep venous thrombosis right peroneal vein provoked by cellulitis  No sign to suggest DVT currently    Chronic acquired lymphedema  Venous stasis dermatitis of both lower extremities  Legs less puffy. Needs  to be more diligent of compression socks. Ulcer gone    Severe episode of recurrent major depressive disorder, without psychotic features (H)  MDD stable on med under care of therapy and psyche. Notes now officially   & seeing someone and may need to discuss Viagra given hx of ED. Discussed side effects. Willing to wait until work up for heart completed    Vasculogenic erectile dysfunction, unspecified vasculogenic erectile dysfunction type  Notes now officially   & seeing someone and may need to discuss Viagra given hx of ED. Discussed side effects. Willing to wait until work up for heart completed.    60 minutes spent on the date of the encounter doing chart review, history and exam, documentation and further activities as noted above     Work on weight loss  Regular exercise  See Patient Instructions    Return in about 3 months (around 6/3/2021) for in person, with me, Follow up.    Layla Murillo MD  [facility]    Alejandro Campbell is a 60 year old who presents for the following health issues     HPI     Diabetes Follow-up  How often are you checking your blood sugar? Three times daily  Blood sugar testing frequency justification:  Adjustment of medication(s), Risk of hypoglycemia with medication(s) and Patient modifying lifestyle changes (diet, exercise) with blood sugars  What time of day are you checking your blood sugars (select all that apply)?  Before and after meals  Have you had any blood sugars above 200?  Yes once  Have you had any blood sugars below 70?  No    What symptoms do you notice when your blood sugar is low?  None    What concerns do you have today about your diabetes? None     Do you have any of these symptoms? (Select all that apply)  Numbness in feet and Burning in feet    Hyperlipidemia Follow-Up    Are you regularly taking any medication or supplement to lower your cholesterol?   Yes- lipitor     Are you having muscle aches or other side effects  that you think could be caused by your cholesterol lowering medication?  No sometimes he has hip pain and is wondering if it might be that     Hypertension Follow-up    Do you check your blood pressure regularly outside of the clinic? No     Are you following a low salt diet? Yes    Are your blood pressures ever more than 140 on the top number (systolic) OR more   than 90 on the bottom number (diastolic), for example 140/90? No    BP Readings from Last 2 Encounters:   03/03/21 123/62   10/28/20 126/84     Hemoglobin A1C (%)   Date Value   03/03/2021 4.1   10/28/2020 7.2 (H)     LDL Cholesterol Calculated (mg/dL)   Date Value   10/28/2020 77   08/06/2019 60   How many servings of fruits and vegetables do you eat daily?  2-3    On average, how many sweetened beverages do you drink each day (Examples: soda, juice, sweet tea, etc.  Do NOT count diet or artificially sweetened beverages)?   0    How many days per week do you exercise enough to make your heart beat faster? 3 or less    How many minutes a day do you exercise enough to make your heart beat faster? A couple thousand steps a day     How many days per week do you miss taking your medication? 0      CURRENTLY   Here for follow up of DM, HTN, HLD, meds and labs  Feeling well   No new symptoms    DM much better HBa1c went from 7.2 to 4.1. but home sugars not correlating , still over 140 in am  Hx of DM nephropathy and neuropathy. Feet are doing ok. On asa, statin, ace, & metformin. Willing to see MTM. Working on diet and exercise.  Microalbuminuria on ACE, lab from today pending  HTN stable on meds, has enough refills  HLD on statin , no side effects, lipids pending  Obesity : working actively to loose weight since oct exercising and lowering carb's with a friend who is a newly diagnosed diabetic and his BMI went from 44 to 42. Feels lost about 20 to 33 pounds.   Called 12/17 about being dizzy. Was advised to go to the ER but didn't. Resolved on its own and after  cleaned ears of wax. Did not have ability to check BP then, planning to get a home BP kit soon. Checking sugars frequently now but now when it happened.   Hx of coronary calcifications on prior Ct done for low sat noted incidentally in the past. Seen by cardiology and on high intensity statin. Denies any chest pain with activity.  Noted today incidental borderline low sat of 89 %, after took deep breaths and cleared collected phlegm in throat / upper airways it went to 91 %.  Does have environmental allergies on an allergy pill daily does have chronic post nasal drip and chronic runny nose and has AMERICA on CPAP managed by [facility]. Hx of prior provoked DVT when had cellulitis in leg few years ago. CT done for similar finding in 2019 showed no PE . Reports no leg swelling asymmetry or pain and edema much improved with weight loss and use of compression socks. Hx of intermittent cough, more when in his basement, has been past yr working out of his home, no cough in past week. No symptoms now. On allergy pills all year long. No chest pain with activity   Legs less puffy. Needs to be more diligent of compression socks. Ulcer gone  Currently denies fever or chills, has had no headache or dizziness, no double or blurry vision, no facial pain, earache, sore throat, no trouble hearing, smelling, tasting or swallowing, no cough now, no chest pain, trouble breathing or palpitations, No abdominal pain, heart burn, reflux, nausea or vomiting , diarrhea yesterday , now resolved, no  constipation, no blood in stools or black stools, no weight loss or night sweats. No dysuria, hematuria, frequency, urgency, hesitancy, incontinence, No pelvic complaints. No new leg swelling or joint pain. No rash.  MDD stable on med under care of therapy and psyche. Notes now officially   & seeing someone and may need to discuss Viagra given hx of ED. Discussed side effects. Willing to wait until work up for heart completed.  Answers for  HPI/ROS submitted by the patient on 3/3/2021   If you checked off any problems, how difficult have these problems made it for you to do your work, take care of things at home, or get along with other people?: Not difficult at all  PHQ9 TOTAL SCORE: 0  NETTIE 7 TOTAL SCORE: 0      BACKGROUND  Hx of morbid obesity, DM on asa & metformin, DM nephropathy DM polyneuropathy, microalbuminuria, HLD on Lipitor, HTN on lisinopril, coronary artery calcification seen on ct scan,  on asa, AMERICA on CPAP under care of sleep, chronic acquired lymphedema on compression socks, hx of prior DVT right peroneal proximal calf in 2016 on eliquis then 3 months as secondary to cellulitis at that time. Seen by hematology & recheck u/S after 3 months anticoagulation showed no DVT & advised no long term anticoagulation at that time. Hx of venous stasis dermatitis B/ LE,  previously treated with Kenalog & compression socks, rosacea on Metrogel under care of derm, , chronic rhinitis, on loratadine,  severe MDD on Lexapro & Abilify under care of therapist & psyche, hx of nephrolithiasis, prior hernia repair, tooth extraction & tonsillectomy, on mv, vit d & B 12 & magnesium, & on turmeric.      Seen first by Dr Duffy in 1/2003 as a hospital follow up from [facility] & noted depression. On 2/2003 noted bad wave of anxiety , felt wanted to be dead & stopped Prozac & felt better. a 3/11/03 note stated He was seen on 10-3-02 with symptoms of depression and was started on Prozac 10 mg daily. Very quickly he felt anxious and at one point felt was walking through a tunnel and felt as though someone was stalking him. He realized this was not true. At another time he felt almost suicidal. He believed both of these events were related to the Prozac and stopped the med. Since then he felt definitely less anxious though still depressed. The multiple stressors with which he presented in Oct. had escalated. He and his then wife had both been in counseling and had since  , planning a divorce. At that point was started on Lexapro 10 mg & was also being evaluated for recurrent abdominal pain. Continued on Lexapro 10 mg all through 2004 & 2005. No mention of depression med in notes until 2007 note when mentioned to continue citalopram ( ? Typo) & was moving to [place].   Next seen 8/2010 at [facility] by nickie mota for a physical & noted then on tetracycline for rosacea & no longer noted to be on any antidepressant. Was just on a statin. In 2011 had an umbilical hernia surgery. In nov 2012 returned to dr Duffy for back pain & 1/30/13 reestablished care with dr Duffy. TCN no longer made/ covered & given Metrogel then. Noted also had a girlfriend at the time. In 9/2013 referred to sleep for snoring & fatigue. Seen by sleep Dr Montaño in 10/2013 & soon after that started on a CPAP. On 3/19/14 started on minocycline for rosacea. On 5/2015 seen by Dr Du for a physical & then seen 12/2015 transfer care to Dr Pradhan who was also primary for  his dad (brought in from [place] who had dementia & second wife was his primary care giver). On metformin by then. Noted care giver stress. In er 5/2/16 for kidney stones. In er admitted 6/12 to 6/14 for cellulitis right leg & associated DVT right peroneal vein. Seen for a physical 3/2017 by dr garcia. Seen by hematology Dr Chowdhury 3/2017, u/S was negative 4/2017 & eliquis discontinued as had taken for about 9 months by then. In er 6/2017 for kidney stones. Seen by [facility] for his sleep concerns,   Hx of most recent cellulitis was left lower leg in 1/2019, with prior strep & staph, hx of rosacea on minocycline in the past & on Metrogel, chronic rhinitis on Claritin, hx of prior kidney stone noted stones in bladder & left kidney an ct 2017, prior tooth extraction, tonsillectomy, on vit d, B 12, magnesium, turmeric, intolerant to fluoxetine, normal stress test in 2011, seen by prior PCP Dr Pradhan 4/26/18 for a physical & noted then was on a keto  diet. Admitted then 1/18 to 1/23/19 for cellulitis secondary O a laceration in left foot sole. U/S negative for DVT, Cx grew staph  GBS strep, evaluated by ID, HBA1c was 6.7 at the time, given vanc & zosyn then ceftriaxone then discharged on Augmentin & lymphedema treatment. Seen by Dr Woods 1/28 hospital follow up & doxycycline given. On 2/13 given keflex on patients insistence that cellulitis still there, had mostly venous stasis dermatitis. Seen 3/4 & no infection sen. given Metrogel for rosacea, LDL was elevated at 99 & cbc was normal. Did lymphedema treatment with good results & discharged wearing compressions socks from OT on 3/19     Seen 4/25/19  first time by this writer as PCP on maternity leave. Had severe depression, NETTIE & addressed dm, polyneuropathy, Morbid obesity, HTN , AMERICA ^ HLD. Lymphedema & venous stasis dermatitis & discoloration were under control with use of compression socks. Had no recurrence of prior DVT or kidney stones & discussed chronic abx use itz minocycline may not be wise specially given current low mood & referred to dermatology. Was restarted on Lexapro, referred to psyche & day treatment & continued with his psychologist. Labs done later showed elevated microalbumin, normal CMP, TSH, cbc, with mildly elevated HBA1c. Declined colonoscopy, was to do a FIT test & deferred Tdap & shingrex to another day.   Seen 5/9/19 for a physical & was feeling 75 % better. When started the Lexapro had one day of lethargy, was functioning and coping better. he plans to do more exercise & has joined the [employer] & has got a . He'd been going to therapy & felt competent and more stable, was seeing his therapist once a week, been a couple times since started  To see psyche 5/30 & was on the wait list for 55 +day treatment at [facility]. Thought felt would go through his own therapist Dr Elham Najera, as  concerned about the time commitment & difficulty with work, & employability. Was to see  dermatology too at [facility].  exam declined. Had a  low sat and cough but cxr was normal. Diabetes  & BP was good. Reminded was due for a dm eye check. Continued on CPAP. Was to consider PSA another time & Tdap and shingles when wife got back from her trip. CTA chest done due to low SATs which was negative for PE. Did show coronary calcifications for which referred to cardiology. Seen by cardiology Dr Sotomayor on 6/13/19 and advised to increase atorvastatin to 80 and lisinopril to 20 and advised fasting lipids in 3 months. Seen by derm on 6/17/19 for rosacea, SK, & dermatitis & continued on current meds.  Seen 8/6/19 for HTN, BP was low, noted dizziness so advised to decrease lisinopril to 15, continued Lipitor, declined shingrex. Noted had lost 20 lbs with slimgeneics 7 DM was doing good. Seen by cardiology. Under care of sleep. Mood better on Abilify. PSA was normal. Lipids in control, CMP normal , bili mildly elevated possibly gilberts glucose was elevated. [facility]/4 for AMERICA and continued on CPAP at 8 to 16 mmHg. Seen by OT on 10/16 & continued on compression socks.      Seen 11/8/19 for well controlled DM, BP and stable chronic issues. Labs done, meds refilled, Tdap given, discussed shingrex. Advised to follow up in 6 months but then the pandemic started.     Seen 10/28/20 for a physical , DM and chronic issues. Noted Diabetes with nephropathy/ neuropathy was slight worse. HBa1c up to 7.8. Encouraged to work on diet, exercise, weight loss and recheck in 3 to 6 months. Continued on metformin as is. If not better would refer to dm ed/ endocrine/ mtm. Was to get a Diabetes eye check  the next day. A wDiabetes foot exam as done. Able to feel sensations, had chronic dystrophic nails. Had a stasis ulcer right shin that did not look infected & to Monitor closely.  BMI> 44, noted had some weight gain. Had been more sedentary since pandemic began and family stress was also playing a role. Continued on  "an ACEfor his Micro albuminemia. Continued on statin for his HLD. Reviewed again had asymptomatic Coronary calcification noted on ct scan, to follow up with cardiology as needed. Diet, exercise, weight loss, control dm and risk factor modification was bryson. MDD iwas n remission on Lexapro and Abilify managed by psychiatrist and seeing a therapist regularly. Noted then was going through divorce proceedings. Remained on CPAP for his AMERICA under care of sleep.  Had Chronic lymphedema/ venous stasis. Two shallow ulcers noted  right lower shin. Had no sign of infection. Had not been using his compression socks a while and weight  gain likely contributed. Encouraged to continue compression socks recently restarted, avoiding salt, elevating legs 1 hr daily above heart level. Had no clinical evidence of recurrent DVT.      Rosacea ihad improved, on Metrogel prn under care of derm. Nephrolithiasis was asymptomatic & to follow up with urology prn. [place] had allergy symptoms that day. Hx of chronic rhinitis. He felt well and advised to  try an OTC allergy pill & monitor for Covid symptoms.    Was to continue care with his therapist psychiatrist, sleep, dermatology vascular, cardiology and urology as need. Given the Flu shot  & was to consider hep B vaccine if not immune &  the shingles vaccine & Return in 6 months for recheck     Microalbumin came back elevated. HBA1c was 7.2. TG , LFT s and glucose was elevated and noted not immune to Hep B. TSH. Lytes, PSA, & CBC were normal.     Review of Systems   Constitutional, HEENT, cardiovascular, pulmonary, GI, , musculoskeletal, neuro, skin, endocrine and psych systems are negative, except as otherwise noted.      Objective    /62 (BP Location: Right arm, Patient Position: Sitting, Cuff Size: Adult Large)   Pulse 76   Temp 99.4  F (37.4  C) (Temporal)   Ht 1.854 m (6' 1\")   Wt 145.1 kg (319 lb 12.8 oz)   SpO2 91%   BMI 42.19 kg/m    Body mass index is 42.19 " kg/m .  Physical Exam   GENERAL: healthy, alert, no distress and obese  EYES: Eyes grossly normal to inspection, PERRL and conjunctivae and sclerae normal  HENT: normal cephalic/atraumatic, ear canals and TM's normal, nose and mouth without ulcers or lesions, nasal mucosa edematous , rhinorrhea clear, oropharynx clear, oral mucous membranes moist, oropharynx crowded and sinuses: not tender  NECK: no adenopathy, no asymmetry, masses, or scars and thyroid normal to palpation  RESP: lungs clear to auscultation - no rales, rhonchi or wheezes, had some conducted sounds that cleared when he cleared his throat ad took deep breaths.  CV: regular rates and rhythm, normal S1 S2, no S3 or S4 and no murmur, click or rub  ABDOMEN: soft, non tender, no hepatosplenomegaly, no masses and bowel sounds normal  MS: extremities normal- no gross deformities noted, has chronic lymphedema   SKIN: no suspicious lesions or rashes  NEURO: Normal strength and tone, mentation intact and speech normal  PSYCH: mentation appears normal, affect normal/bright

## 2021-03-04 ENCOUNTER — TELEPHONE (OUTPATIENT)
Dept: CARDIOLOGY | Facility: CLINIC | Age: 61
End: 2021-03-04

## 2021-03-04 ASSESSMENT — ANXIETY QUESTIONNAIRES: GAD7 TOTAL SCORE: 0

## 2021-03-04 ASSESSMENT — PATIENT HEALTH QUESTIONNAIRE - PHQ9: SUM OF ALL RESPONSES TO PHQ QUESTIONS 1-9: 0

## 2021-03-04 NOTE — RESULT ENCOUNTER NOTE
Rosey Mr. Vazquez,  Your results came back and though micro albumin is elevated it is much improved. If you have any further concerns please do not hesitate to contact us by message, phone or making an appointment.  Have a good day   Dr Chidi MCKEE

## 2021-03-05 NOTE — TELEPHONE ENCOUNTER
RECORDS RECEIVED FROM: internal    DATE RECEIVED: 4.21.21    NOTES STATUS DETAILS   OFFICE NOTE from referring provider Layla Rangel   OFFICE NOTE from other specialist na    DISCHARGE SUMMARY from hospital na    DISCHARGE REPORT from the ER na    MEDICATION LIST internal     IMAGING  (NEED IMAGES AND REPORTS)     CT SCAN internal  5.31.19   CHEST XRAY (CXR) internal  3.3.21, 5.31.19,    TESTS     PULMONARY FUNCTION TESTING (PFT) internal  Scheduled 4.21.21

## 2021-03-07 NOTE — PROGRESS NOTES
Medication Therapy Management (MTM) Encounter    ASSESSMENT:                            Medication Adherence/Access: No issues identified    Type 2 Diabetes: Stable. Patient is meeting A1c goal of < 7%. Self monitoring of blood glucose is not at goal of fasting  mg/dL and post prandial < 150 mg/dL. Patient would benefit from minimum SMBG: Check blood sugars fasting, and occasionally 2 hours after starting a meal, Metformin :  stay on the same dose., Increased exercise, updated Hemoglobin A1c-4.1% --had lab repeat it unsure why so low based on bs's numbers perhaps should be higher, Increase in home glucose monitoring-use non- test strips  and weight loss recommended-see plan for my low carb + intermittent fasting lifestyle plan.    Hyperlipidemia: Stable.    Hypertension: Stable. Patient is meeting blood pressure goal of < 140/90mmHg. Patient would benefit from the following changes - watching diet, increasing exercise and weight loss.      PLAN:                              1. FYI--great news last 30 days --I love your low-carb diet - --keep it going , strive for  2 main low-carb meals daily - in a daily 8 hour eat window , fast for 16 hours on water, coffee, tea  (ok to add a low carb protein shake in AM if needed ); start using your ellipitical or walk --30 minutes /day , strength train 1-2 days /week is great as well.    Stay on your Metformin daily dose as is , check blood sugars 2 x day (fasting in Am -but remember this may be an elevated blood sugar number based on hormonal changes overnight so don't get too upset about this ; then also check 2 hours once daily after any main meal . Please make sure you are using test strips that are in date .    Remember:  1. Insulin is your body's fat storage hormone. When you eat meals high in carbohydrates your pancreas releases insulin to store the excess food energy as fat. By intermittent fasting, or eating all of your daily food in an 8 hour period while  "fasting the remaining 16, you reduce the amount of insulin your body is releasing, resulting in less storage of food energy as fat. Focusing on a diet as low in carbohydrate as you can tolerate will also help reduce the amount of insulin your pancreas releases. Low carbohydrate diet + intermittent fasting will result in weight loss and improved blood sugars with fewer medications!    No snacking between meals or after dinner .      2. When you feel hungry in your fasting window, try drinking a zero calorie liquid, like water,coffee, tea  first.     3. Check out the website The Fasting Method.[employer](join free portion of website) or \"The Diabetes Code\" book by Dr. Harman Randle for more details. Dr. Randle also has a cookbook out with meals/recipes --the book is called The Obesity Code Cookbook.    4. Remember -these 7 triggers (Anxiety, stress, depression, boredom, comfort, habit, pain) make us want to eat carbohydrates -control those triggers and you will lose weight .    5. Exercise : 30 minutes /day -every day of the week. Examples: brisk walk indoors or out , treadmill walk at normal pace with raised incline, elliptical, stationary bike, swimming.     6. Remember: Two things make this lifestyle diet change work in the longterm - what you eat and when you eat.           It was great to speak with you today.  I value your experience and would be very thankful for your time with providing feedback on our clinic survey. You may receive a survey via email or text message in the next few days.     Next [facility] visit:  A1c lab recheck -June 8th -2021 at 9:30am --fast overnight for labs , bring [employer] blood sugar meter .         SUBJECTIVE/OBJECTIVE:                          Peterson Vazquez is a 60 year old male called for an initial visit. He was referred to me from Layla Murillo  .      Reason for visit: A1c 4.1%? Is that correct ?    Allergies/ADRs: Reviewed in chart  Tobacco: He reports that he has never smoked. He has never " used smokeless tobacco.  Alcohol: a shot of scotch -2 shots /week.   Caffeine: 4 cups/day of coffee  Activity:  Low -some walking-good day =2000 K steps/day .   Past Medical History: Reviewed in chart, DM x 5 years per patient .   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 In next year --weight goal is 220lbs. (like age 27).     Medication Adherence/Access: no issues reported    Type 2 Diabetes:  Currently taking : Metformin 500mg ER -2 tabs twice daily.    Patient is not experiencing side effects.  Blood sugar monitoring: [employer] meter :  1-2 time(s) daily. Ranges (patient reported): Fasting- 191 , usually 180 , ate 2-3 meatballs + small greek salad + dressing   Post-Prandial-  2 hours post meals --150's.   mtm verified test strips are  2021.  He has new test strips though .   He ate cheese and apple 1 hour ago- he tested blood sugar at 4;28pm = 137.   Symptoms of low blood sugar? None, had vertigo a month ago .  Symptoms of high blood sugar? none  Eye exam: up to date  Foot exam: up to date  Diet/Exercise: low carb eating x 1 month - keto like x 30 days . He eats fruit -bfast mid am , salad and cheese lunch , protein and veggies with dinner at 8 pm , no snacks at all, liquids- 4-5 -16 ounce glasses water /day .   312 lbs at home --wants  lbs. in next year or so.   Sleep 11pm-midnight --sleeps 7-8 hours.     He has universal gym and elliptical but not using it .     Aspirin: Taking 81mg daily and denies side effects  Statin: Yes: Atorvastatin .    ACEi/ARB: Yes: Lisinopril    Urine Albumin:   Lab Results   Component Value Date    UMALCR 91.50 (H) 2021      Lab Results   Component Value Date    A1C 4.1 2021    A1C 7.2 10/28/2020    A1C 5.7 2019    A1C 5.5 2019    A1C 7.5 2019       Hyperlipidemia: Current therapy includes ; atorvastatin 80mg daily.  Patient reports no significant myalgias or other side effects.  The ASCVD Risk score (Josué DC  Jr. et al., 2013) failed to calculate for the following reasons:    The valid total cholesterol range is 130 to 320 mg/dL  Recent Labs   Lab Test 21  1026 10/28/20  0920 05/07/15  1005 05/07/15  1005 14  0846   CHOL 107 170   < > 171 200*   HDL 39* 45   < > 36* 40*   LDL 34 77   < > 93 103   TRIG 171* 239*   < > 208* 283*   CHOLHDLRATIO  --   --   --  4.8 5.0    < > = values in this interval not displayed.       Hypertension: Current medications include : Lisinopril 20mg./day .  Patient does not self-monitor blood pressure.  Patient reports no current medication side effects.  BP Readings from Last 3 Encounters:   21 123/62   10/28/20 126/84   19 122/66             I spent 50 minutes with this patient today. All changes were made via collaborative practice agreement with Layla Murillo  . A copy of the visit note was provided to the patient's primary care provider.    The patient was sent via [email or web address] a summary of these recommendations.     Clarissa Gonzalez [facility].  Medication Therapy Management Provider  [phone]      Telemedicine Visit Details  Type of service:  Telephone visit  Start Time: 4:00pm  End Time: 4;50pm  Originating Location (patient location): Home  Distant Location (provider location):  [facility] [place]     Medication Therapy Recommendations  Diabetic polyneuropathy associated with type 2 diabetes mellitus (H)    Current Medication: Blood Glucose Monitoring Suppl (BLOOD GLUCOSE METER) KIT   Rationale: Incorrect administration - Adverse medication event - Safety   Recommendation: Provide Education - BLOOD GLUCOSE TEST STRIPS Strp - use in date not  test strips ; test blood sugars 2 x day as directed.   Status: Accepted per CPA

## 2021-03-08 ENCOUNTER — VIRTUAL VISIT (OUTPATIENT)
Dept: PHARMACY | Facility: CLINIC | Age: 61
End: 2021-03-08
Attending: FAMILY MEDICINE
Payer: COMMERCIAL

## 2021-03-08 ENCOUNTER — TELEPHONE (OUTPATIENT)
Dept: CARDIOLOGY | Facility: CLINIC | Age: 61
End: 2021-03-08

## 2021-03-08 DIAGNOSIS — I10 ESSENTIAL HYPERTENSION: Chronic | ICD-10-CM

## 2021-03-08 DIAGNOSIS — E55.9 VITAMIN D DEFICIENCY: ICD-10-CM

## 2021-03-08 DIAGNOSIS — R80.9 MICROALBUMINURIA: ICD-10-CM

## 2021-03-08 DIAGNOSIS — E11.42 DIABETIC POLYNEUROPATHY ASSOCIATED WITH TYPE 2 DIABETES MELLITUS (H): Chronic | ICD-10-CM

## 2021-03-08 DIAGNOSIS — E53.8 VITAMIN B12 DEFICIENCY (NON ANEMIC): ICD-10-CM

## 2021-03-08 DIAGNOSIS — E11.21 TYPE 2 DIABETES MELLITUS WITH DIABETIC NEPHROPATHY, WITHOUT LONG-TERM CURRENT USE OF INSULIN (H): Primary | Chronic | ICD-10-CM

## 2021-03-08 DIAGNOSIS — E78.5 HYPERLIPIDEMIA, UNSPECIFIED HYPERLIPIDEMIA TYPE: Chronic | ICD-10-CM

## 2021-03-08 PROCEDURE — 99605 MTMS BY PHARM NP 15 MIN: CPT | Mod: TEL | Performed by: PHARMACIST

## 2021-03-08 PROCEDURE — 99607 MTMS BY PHARM ADDL 15 MIN: CPT | Mod: TEL | Performed by: PHARMACIST

## 2021-03-08 NOTE — Clinical Note
Layla--x for mt referral --I had lab rerun his blood from the third --a1c still low at 4.1% -but he has been eating keto like last 30+ days . I did however note he has been using  test strips so I had him check today with in date test strips and bs was quite a bit lower than he had seen .    I will see him  for f/up labs etc, he will see you on the  to discuss as well.    Thank you , Clarissa Gonzalez [facility].  Medication Therapy Management Provider  [phone]

## 2021-03-08 NOTE — TELEPHONE ENCOUNTER
Pt needs to schedule an appointment with a general cardiologist per a referral.    Please link referral request.

## 2021-03-08 NOTE — PATIENT INSTRUCTIONS
Recommendations from today's MTM visit:                                                    MTM (medication therapy management) is a service provided by a clinical pharmacist designed to help you get the most of out of your medicines.   Today we reviewed what your medicines are for, how to know if they are working, that your medicines are safe and how to make your medicine regimen as easy as possible.      1. FYI--great news last 30 days --I love your low-carb diet - --keep it going , strive for  2 main low-carb meals daily - in a daily 8 hour eat window , fast for 16 hours on water, coffee, tea  (ok to add a low carb protein shake in AM if needed ); start using your ellipitical or walk --30 minutes /day , strength train 1-2 days /week is great as well.    Stay on your Metformin daily dose as is , check blood sugars 2 x day (fasting in Am -but remember this may be an elevated blood sugar number based on hormonal changes overnight so don't get too upset about this ; then also check 2 hours once daily after any main meal . Please make sure you are using test strips that are in date .    Remember:  1. Insulin is your body's fat storage hormone. When you eat meals high in carbohydrates your pancreas releases insulin to store the excess food energy as fat. By intermittent fasting, or eating all of your daily food in an 8 hour period while fasting the remaining 16, you reduce the amount of insulin your body is releasing, resulting in less storage of food energy as fat. Focusing on a diet as low in carbohydrate as you can tolerate will also help reduce the amount of insulin your pancreas releases. Low carbohydrate diet + intermittent fasting will result in weight loss and improved blood sugars with fewer medications!    No snacking between meals or after dinner .      2. When you feel hungry in your fasting window, try drinking a zero calorie liquid, like water,coffee, tea  first.     3. Check out the website The Fasting  "Method.com(join free portion of website) or \"The Diabetes Code\" book by Dr. Harman Randle for more details. Dr. Randle also has a cookbook out with meals/recipes --the book is called The Obesity Code Cookbook.    4. Remember -these 7 triggers (Anxiety, stress, depression, boredom, comfort, habit, pain) make us want to eat carbohydrates -control those triggers and you will lose weight .    5. Exercise : 30 minutes /day -every day of the week. Examples: brisk walk indoors or out , treadmill walk at normal pace with raised incline, elliptical, stationary bike, swimming.     6. Remember: Two things make this lifestyle diet change work in the longterm - what you eat and when you eat.           It was great to speak with you today.  I value your experience and would be very thankful for your time with providing feedback on our clinic survey. You may receive a survey via email or text message in the next few days.     Next MTM visit:  A1c lab recheck -June 8th -2021 at 9:30am --fast overnight for labs , bring [employer] blood sugar meter .     To schedule another MTM appointment, please call the clinic directly or you may call the MTM scheduling line at [phone] or toll-free at [phone].     My Clinical Pharmacist's contact information:                                                      It was a pleasure talking with you today!  Please feel free to contact me with any questions or concerns you have.      Clarissa Gonzalez [facility].  Medication Therapy Management Provider  [phone]          "

## 2021-03-26 ENCOUNTER — ANCILLARY PROCEDURE (OUTPATIENT)
Dept: GENERAL RADIOLOGY | Facility: CLINIC | Age: 61
End: 2021-03-26
Attending: STUDENT IN AN ORGANIZED HEALTH CARE EDUCATION/TRAINING PROGRAM
Payer: COMMERCIAL

## 2021-03-26 ENCOUNTER — OFFICE VISIT (OUTPATIENT)
Dept: CARDIOLOGY | Facility: CLINIC | Age: 61
End: 2021-03-26
Attending: FAMILY MEDICINE
Payer: COMMERCIAL

## 2021-03-26 VITALS — OXYGEN SATURATION: 87 % | WEIGHT: 304 LBS | HEIGHT: 74 IN | BODY MASS INDEX: 39.01 KG/M2

## 2021-03-26 DIAGNOSIS — R79.81 BORDERLINE LOW OXYGEN SATURATION LEVEL: ICD-10-CM

## 2021-03-26 DIAGNOSIS — I25.10 CORONARY ARTERY CALCIFICATION SEEN ON CAT SCAN: ICD-10-CM

## 2021-03-26 DIAGNOSIS — R42 DIZZINESS: ICD-10-CM

## 2021-03-26 DIAGNOSIS — J96.01 ACUTE RESPIRATORY FAILURE WITH HYPOXIA (H): ICD-10-CM

## 2021-03-26 DIAGNOSIS — J96.01 ACUTE RESPIRATORY FAILURE WITH HYPOXIA (H): Primary | ICD-10-CM

## 2021-03-26 LAB
ERYTHROCYTE [DISTWIDTH] IN BLOOD BY AUTOMATED COUNT: 18.5 % (ref 10–15)
HCT VFR BLD AUTO: 27.1 % (ref 40–53)
HGB BLD-MCNC: 8.1 G/DL (ref 13.3–17.7)
MCH RBC QN AUTO: 35.7 PG (ref 26.5–33)
MCHC RBC AUTO-ENTMCNC: 29.9 G/DL (ref 31.5–36.5)
MCV RBC AUTO: 119 FL (ref 78–100)
PLATELET # BLD AUTO: 209 10E9/L (ref 150–450)
RBC # BLD AUTO: 2.27 10E12/L (ref 4.4–5.9)
WBC # BLD AUTO: 7 10E9/L (ref 4–11)

## 2021-03-26 PROCEDURE — 71046 X-RAY EXAM CHEST 2 VIEWS: CPT | Mod: GC | Performed by: RADIOLOGY

## 2021-03-26 PROCEDURE — 99215 OFFICE O/P EST HI 40 MIN: CPT | Performed by: STUDENT IN AN ORGANIZED HEALTH CARE EDUCATION/TRAINING PROGRAM

## 2021-03-26 PROCEDURE — 93005 ELECTROCARDIOGRAM TRACING: CPT

## 2021-03-26 PROCEDURE — 36415 COLL VENOUS BLD VENIPUNCTURE: CPT | Performed by: PATHOLOGY

## 2021-03-26 PROCEDURE — 85027 COMPLETE CBC AUTOMATED: CPT | Performed by: PATHOLOGY

## 2021-03-26 PROCEDURE — G0463 HOSPITAL OUTPT CLINIC VISIT: HCPCS | Mod: 25

## 2021-03-26 ASSESSMENT — MIFFLIN-ST. JEOR: SCORE: 2248.68

## 2021-03-26 ASSESSMENT — PAIN SCALES - GENERAL: PAINLEVEL: NO PAIN (0)

## 2021-03-26 NOTE — NURSING NOTE
Chief Complaint   Patient presents with     New Patient     present for Re-establish care- discuss scan- dizziness- low oxygen level      Vitals were taken and medications were reconciled. EKG was performed    Nica NAVARRO  9:45 AM

## 2021-03-26 NOTE — LETTER
3/26/2021      RE: Peterson Vazquez  [address]  [facility] [address]       Dear Colleague,    Thank you for the opportunity to participate in the care of your patient, Peterson Vazquez, at the [facility] [place] at . Please see a copy of my visit note below.            Chief Complaint: Evaluation of coronary artery calcifications and dizziness     HPI: Mr. Peterson Vazquez is a pleasant 60-year-old male with a background history of noninsulin-dependent type 2 diabetes mellitus, hypertension, dyslipidemia, obesity, and a prior history of a single popliteal vein deep venous thrombosis in 2016 who presented in referral from Dr. Layla Murillo for evaluation of dizziness, hypoxia and coronary artery calcifications.    Briefly, Mr. Vazquez was seen by my colleague Dr. Xi Sotomayor in May 2019 for evaluation of coronary artery calcifications.  At this time, his atorvastatin dosage was increased to 80 mg once daily and he was also noted to be mildly hypertensive so his lisinopril was increased to 20 mg once daily.  Since then, he has continued on these doses.  He has also been pursuing intentional weight loss and exercise, however, this has been inhibited by the pandemic restrictions.    Since then, Mr. Vazquez had an episode of dizziness.  Mr. Vazquez described this as a vertiginous sensation with the room spinning in front of him. He describes having this in December and it lasting for approximately 24 hours.  It resolved spontaneously.  It had never occurred prior to his episode and has never occurred since.  Mr. Vazquez did not have any recent viral infections or contact with anyone with COVID-19 at the time.    Mr. Vazquez was seen in clinic recently where he was noted to have hypoxia with oxygen saturation approximately 89%.  After taking a few deep breaths he was noted to have 92%.  This hypoxia prompted referral to cardiology given  "that it was new.    At the time of the consultation, he denies any chest pain, dyspnea at rest or with exertion, PND, orthopnea, peripheral edema, palpitations, lightheadedness or syncope.  Moreover, he does not have any recent hemoptysis, leg swelling/pain/calf erythema, sputum purulence.  Mr. Vazquez notes that he is highly adherent to his CPAP regimen because it helps him sleep better.  Finally, Mr. Ruth notes that he has had a cough productive of few teaspoons of clear sputum on a daily basis for \"over 20 years\".  The volume in nature have not changed in this time aside from exacerbations, which are presumably related to viral upper respiratory tract infections.    A comprehensive ROS was done and the details are included above in the HPI.    Past Medical History:  - T2DM (NIDDM)  - DVT  - Hypertension  - No prior ACS/MI, TIA/stroke, peripheral arterial disease, or aneurysms.    Past Medical History:   Diagnosis Date     Cellulitis of left lower extremity 1/18/2019     Chronic acquired lymphedema 1/19/2019     Chronic rhinitis      Depressive disorder      DVT (deep venous thrombosis) (H)     R peroneal vein in 2016     History of depression 2003     HTN (hypertension) 4/30/2013     Morbid obesity (H)      Other and unspecified hyperlipidemia      Type II or unspecified type diabetes mellitus without mention of complication, not stated as uncontrolled 7-05       Past Surgical History:    Past Surgical History:   Procedure Laterality Date     HC TOOTH EXTRACTION W/FORCEP      WISDOM TEETH     HERNIA REPAIR  2011    mesh     TONSILLECTOMY      TONSILS       Drug History:  Home cardiac meds: ASA 81, atorvastatin 80 mg q24h, lisinopril 20 mg q24h. No over-the-counter medications aside from turmeric and B12.  Current Outpatient Medications   Medication Sig Dispense Refill     ARIPiprazole (ABILIFY) 2 MG tablet Take 1 tablet (2 mg) by mouth daily       aspirin 81 MG EC tablet Take 1 tablet (81 mg) by mouth daily 90 " tablet 3     atorvastatin (LIPITOR) 80 MG tablet TAKE 1 TABLET DAILY. DUE FOR APPOINTMENT 2020, NO FURTHER REFILLS 90 tablet 3     Blood Glucose Monitoring Suppl (BLOOD GLUCOSE METER) KIT 1 Device daily 1 kit 2     cholecalciferol (VITAMIN D) 1000 UNIT tablet Take 1 tablet (1,000 Units) by mouth daily 100 tablet 3     Cyanocobalamin (B-12) 1000 MCG TBCR Take 1,000 mcg by mouth daily 100 tablet 1     escitalopram (LEXAPRO) 10 MG tablet TAKE 1 TABLET DAILY (FURTHER RECOMMENDATIONS BY PSYCHE) 90 tablet 0     lisinopril (ZESTRIL) 20 MG tablet Take 1 tablet (20 mg) by mouth daily 90 tablet 1     loratadine (CLARITIN) 10 MG tablet Take 10 mg by mouth At Bedtime       magnesium gluconate (MAGONATE) 500 (27 Mg) MG tablet Take 500 mg by mouth At Bedtime       metFORMIN (GLUCOPHAGE-XR) 500 MG 24 hr tablet Take 2 tablets (1,000 mg) by mouth 2 times daily (with meals) 360 tablet 3     metroNIDAZOLE (METROGEL) 1 % external gel Apply topically daily 60 g 11     Multiple Vitamin (DAILY MULTIVITAMIN PO) Take 1 tablet by mouth At Bedtime        Turmeric (RA TURMERIC) 500 MG CAPS Take 1 capsule by mouth daily       Family History:  - Father with bypass grafting at age 74  - Brother diagnosed with end-stage heart failure in 40s and  at age 50.  The etiology of the cardiomyopathy was unclear.  Family History   Problem Relation Age of Onset     Cancer Mother         CA brain, lung, starte din tear duct,  age 52     Other Cancer Mother      Cardiovascular Father         CABG @ 74, periph. vas. disease     Alzheimer Disease Father      Chronic Obstructive Pulmonary Disease Father      Other - See Comments Father         GBS     Depression Father      Cardiovascular Brother         MI, chf dx 40,  mi age 50      Obesity Brother      Depression Other      Cancer - colorectal No family hx of      Social History:  - Works as an    - Never smoker  - Recent divorce  - Drinks 1-2 drinks/week and drank about a  "drink daily soon after divorce   - No illicit substance usage.  Social History     Tobacco Use     Smoking status: Never Smoker     Smokeless tobacco: Never Used   Substance Use Topics     Alcohol use: Yes     Alcohol/week: 0.0 standard drinks     Comment: 1 drink monthly       Allergies   Allergen Reactions     Fluoxetine Hcl      Prozac: anxiety         Physical Examination:  Vitals: Ht 1.88 m (6' 2\")   Wt 137.9 kg (304 lb)   SpO2 (!) 87%   BMI 39.03 kg/m    BMI= Body mass index is 39.03 kg/m .    Orthsotatic vitals:  Lyin/66, 92  Sittin/63, 96  Standin/51, 100     The SpO2 was repeated with an ear probe and noted to be 89%.     GENERAL: Healthy, alert and no distress  Eyes: No xanthelasmas.  NECK: No palpable neck masses/goitre and no evidence of retrosternal goitre.   ENT: Moist mucosal membranes.  RESPIRATORY: No signs of resp distress. Lungs CTAB.  CARDIOVASCULAR:  No JVD, regular, normal S1+S2 2/6 ESM loudest at RUSB. No radiation to carotids.   ABDOMEN: ND, soft, non-tender.  EXTREMITIES: Warm, well-perfused, no edema.   NEUROLOGY: GCS 15/15, pleasant affect.  VASC: No carotid bruits. Radial, brachial,dorsalis pedis and posterior tibialis pulses are normal in volumes and symmetric bilaterally.   SKIN: No ecchymoses, no rashes.  PSYCH: Cooperative, pleasant affect.       Investigations:  I personally viewed and interpreted the following investigations:    Labs:    CBC RESULTS:  Lab Results   Component Value Date    WBC 7.0 2021    RBC 2.27 (L) 2021    HGB 8.1 (L) 2021    HCT 27.1 (L) 2021     (H) 2021    MCH 35.7 (H) 2021    MCHC 29.9 (L) 2021    RDW 18.5 (H) 2021     2021       CMP RESULTS:  Lab Results   Component Value Date     2021    POTASSIUM 4.3 2021    CHLORIDE 110 (H) 2021    CO2 22 2021    ANIONGAP 8 2021     (H) 2021    BUN 24 2021    CR 1.02 2021    " GFRESTIMATED 78 03/03/2021    GFRESTBLACK >90 03/03/2021    NING 9.2 03/03/2021    BILITOTAL 3.1 (H) 03/03/2021    ALBUMIN 3.9 03/03/2021    ALKPHOS 87 03/03/2021    ALT 53 03/03/2021    AST 33 03/03/2021        Lab Results   Component Value Date    CHOL 107 03/03/2021     Lab Results   Component Value Date    HDL 39 03/03/2021     Lab Results   Component Value Date    LDL 34 03/03/2021     Lab Results   Component Value Date    TRIG 171 03/03/2021     Lab Results   Component Value Date    CHOLHDLRATIO 4.8 05/07/2015       Recent Tests:    Electrocardiogram (03/26/2021):  NSR with occasional PVCs, ST depression and TWI diffusely.    Chest x-ray (03/26/2021):  No focal findings.     CTPA (05/31/2019):  FINDINGS: There is no pulmonary embolism. Lungs are clear of acute  infiltrates. The heart is not enlarged. There are moderate coronary  vascular calcifications consistent with coronary artery disease.  Thoracic aorta is atherosclerotic without evidence of dissection or  aneurysm. There is no pleural or pericardial effusion.  There is no  pneumothorax. Adrenal glands demonstrate minimal nodular thickening on  the left not significantly changed since June 21, 2017. There is  cholelithiasis without evidence for cholecystitis. Remainder of the  visualized upper abdomen is unremarkable.       Assessment and Plan:   Peterson Vazquez is a 60 year old male with a PMHx of diabetes mellitus, hypertension, dyslipidemia, and coronary disease (in the form of coronary artery calcifications) who presented today for evaluation of hypoxia.    Mr. Vazquez' hypoxia is striking given that his oxygen saturation declined as low as 85 while he was sitting comfortably and seemingly devoid of symptoms.  From a cardiovascular standpoint, the only focal sign or symptom of suspicion is the ejection systolic murmur that I heard in his examination.  Aside from this, he appears well compensated from a volume standpoint and not exhibiting signs of  heart failure; chronic coronary disease or acute coronary syndrome; arrhythmias. The echocardiogram will also help to determine the etiology of the ST depression and TWI, for which there is not a clear explanation for on the history and examination. I did talk to Mr. Vazquez about inpatient/ER evaluation but he declined because he was feeling well. I suggested that if he had signs of bleeding, acute and unrelenting dyspnea, or chest pain, that he consider ER evaluation.    Given that the only sign that I witnessed is a cardiac murmur, I will seek an echocardiogram.  We worked this up further with a chest x-ray and a CBC to ensure that there is no acute lung process or decline in his hemoglobin, respectively.  I have also ordered an ABG to determine the A-a gradient. The chest x-ray was unremarkable with a hemoglobin was notable for a marked drop from 13 in October 2020 to 8 today.  Additionally, there was a marked macrocytosis with MCV of 119. I discussed these results with the patient over the phone and with his PCP. He was not had hematemesis, hematuria, hematochezia, melena, hemoptysis, or any recent coffee-ground emesis. I again confirmed that the patient has not been drinking more than 1-2 drinks/week.    The other differential may be refractory sleep apnea causing daytime hypoxemia, which has been reported. He is seeing the Pulmonology team, where he will presumably be worked up for this.     - Coronary artery calcifications (moderate)  - Diabetes mellitus (non-insulin dependent)  - Hypertension  - Dyslipidemia    - Obtain TTE to evaluate for structural heart disease  - Follow-up with Pulmonology for AMERICA evaluation  - Follow-up with PCP for anemia evaluation     Chart review time: 20  Visit time: 60  Total time spent: 60  >50% of this 60-minute visit were spent with the patient on in-person counseling and discussion regarding hypoxia, CAC, hypertension, and dyslipidemia.     George Horton [facility], MS  Assistant  Professor  Cardiovascular Division  Pager [phone]    CC  Patient Care Team:  Layla Murillo MD as PCP - General (Family Practice)    Clarissa Gonzalez RPH as Pharmacist (Pharmacist)

## 2021-03-26 NOTE — NURSING NOTE
Cardiac Testing: Patient given instructions regarding  echocardiogram . Discussed purpose, preparation, procedure and when to expect results reported back to the patient. Patient demonstrated understanding of this information and agreed to call with further questions or concerns.  Labs:  Patient was instructed to return for the next laboratory testing at  . Patient demonstrated understanding of this information and agreed to call with further questions or concerns.   Med Reconcile: Reviewed and verified all current medications with the patient. The updated medication list was printed and given to the patient.  Return Appointment: Patient given instructions regarding scheduling next clinic visit. Patient demonstrated understanding of this information and agreed to call with further questions or concerns.  Patient stated he understood all health information given and agreed to call with further questions or concerns.    Michelle Cannon LPN

## 2021-03-26 NOTE — PROGRESS NOTES
Chief Complaint: Evaluation of coronary artery calcifications and dizziness     HPI: Mr. Peterson Vazquez is a pleasant 60-year-old male with a background history of noninsulin-dependent type 2 diabetes mellitus, hypertension, dyslipidemia, obesity, and a prior history of a single popliteal vein deep venous thrombosis in 2016 who presented in referral from Dr. Layla Murillo for evaluation of dizziness, hypoxia and coronary artery calcifications.    Briefly, Mr. Vazquez was seen by my colleague Dr. Xi Sotomayor in May 2019 for evaluation of coronary artery calcifications.  At this time, his atorvastatin dosage was increased to 80 mg once daily and he was also noted to be mildly hypertensive so his lisinopril was increased to 20 mg once daily.  Since then, he has continued on these doses.  He has also been pursuing intentional weight loss and exercise, however, this has been inhibited by the pandemic restrictions.    Since then, Mr. Vazquez had an episode of dizziness.  Mr. Vazquez described this as a vertiginous sensation with the room spinning in front of him. He describes having this in December and it lasting for approximately 24 hours.  It resolved spontaneously.  It had never occurred prior to his episode and has never occurred since.  Mr. Vazquez did not have any recent viral infections or contact with anyone with COVID-19 at the time.    Mr. Vazquez was seen in clinic recently where he was noted to have hypoxia with oxygen saturation approximately 89%.  After taking a few deep breaths he was noted to have 92%.  This hypoxia prompted referral to cardiology given that it was new.    At the time of the consultation, he denies any chest pain, dyspnea at rest or with exertion, PND, orthopnea, peripheral edema, palpitations, lightheadedness or syncope.  Moreover, he does not have any recent hemoptysis, leg swelling/pain/calf erythema, sputum purulence.  Mr. Vazquez notes that he is highly adherent to his CPAP  "regimen because it helps him sleep better.  Finally, Mr. Ruth notes that he has had a cough productive of few teaspoons of clear sputum on a daily basis for \"over 20 years\".  The volume in nature have not changed in this time aside from exacerbations, which are presumably related to viral upper respiratory tract infections.    A comprehensive ROS was done and the details are included above in the HPI.    Past Medical History:  - T2DM (NIDDM)  - DVT  - Hypertension  - No prior ACS/MI, TIA/stroke, peripheral arterial disease, or aneurysms.    Past Medical History:   Diagnosis Date     Cellulitis of left lower extremity 1/18/2019     Chronic acquired lymphedema 1/19/2019     Chronic rhinitis      Depressive disorder      DVT (deep venous thrombosis) (H)     R peroneal vein in 2016     History of depression 2003     HTN (hypertension) 4/30/2013     Morbid obesity (H)      Other and unspecified hyperlipidemia      Type II or unspecified type diabetes mellitus without mention of complication, not stated as uncontrolled 7-05       Past Surgical History:    Past Surgical History:   Procedure Laterality Date     HC TOOTH EXTRACTION W/FORCEP      WISDOM TEETH     HERNIA REPAIR  2011    mesh     TONSILLECTOMY      TONSILS       Drug History:  Home cardiac meds: ASA 81, atorvastatin 80 mg q24h, lisinopril 20 mg q24h. No over-the-counter medications aside from turmeric and B12.  Current Outpatient Medications   Medication Sig Dispense Refill     ARIPiprazole (ABILIFY) 2 MG tablet Take 1 tablet (2 mg) by mouth daily       aspirin 81 MG EC tablet Take 1 tablet (81 mg) by mouth daily 90 tablet 3     atorvastatin (LIPITOR) 80 MG tablet TAKE 1 TABLET DAILY. DUE FOR APPOINTMENT SEPTEMBER 2020, NO FURTHER REFILLS 90 tablet 3     Blood Glucose Monitoring Suppl (BLOOD GLUCOSE METER) KIT 1 Device daily 1 kit 2     cholecalciferol (VITAMIN D) 1000 UNIT tablet Take 1 tablet (1,000 Units) by mouth daily 100 tablet 3     Cyanocobalamin " (B-12) 1000 MCG TBCR Take 1,000 mcg by mouth daily 100 tablet 1     escitalopram (LEXAPRO) 10 MG tablet TAKE 1 TABLET DAILY (FURTHER RECOMMENDATIONS BY PSYCHE) 90 tablet 0     lisinopril (ZESTRIL) 20 MG tablet Take 1 tablet (20 mg) by mouth daily 90 tablet 1     loratadine (CLARITIN) 10 MG tablet Take 10 mg by mouth At Bedtime       magnesium gluconate (MAGONATE) 500 (27 Mg) MG tablet Take 500 mg by mouth At Bedtime       metFORMIN (GLUCOPHAGE-XR) 500 MG 24 hr tablet Take 2 tablets (1,000 mg) by mouth 2 times daily (with meals) 360 tablet 3     metroNIDAZOLE (METROGEL) 1 % external gel Apply topically daily 60 g 11     Multiple Vitamin (DAILY MULTIVITAMIN PO) Take 1 tablet by mouth At Bedtime        Turmeric (RA TURMERIC) 500 MG CAPS Take 1 capsule by mouth daily       Family History:  - Father with bypass grafting at age 74  - Brother diagnosed with end-stage heart failure in 40s and  at age 50.  The etiology of the cardiomyopathy was unclear.  Family History   Problem Relation Age of Onset     Cancer Mother         CA brain, lung, starte din tear duct,  age 52     Other Cancer Mother      Cardiovascular Father         CABG @ 74, periph. vas. disease     Alzheimer Disease Father      Chronic Obstructive Pulmonary Disease Father      Other - See Comments Father         GBS     Depression Father      Cardiovascular Brother         MI, chf dx 40,  mi age 50      Obesity Brother      Depression Other      Cancer - colorectal No family hx of      Social History:  - Works as an    - Never smoker  - Recent divorce  - Drinks 1-2 drinks/week and drank about a drink daily soon after divorce   - No illicit substance usage.  Social History     Tobacco Use     Smoking status: Never Smoker     Smokeless tobacco: Never Used   Substance Use Topics     Alcohol use: Yes     Alcohol/week: 0.0 standard drinks     Comment: 1 drink monthly       Allergies   Allergen Reactions     Fluoxetine Hcl      Prozac: anxiety  "        Physical Examination:  Vitals: Ht 1.88 m (6' 2\")   Wt 137.9 kg (304 lb)   SpO2 (!) 87%   BMI 39.03 kg/m    BMI= Body mass index is 39.03 kg/m .    Orthsotatic vitals:  Lyin/66, 92  Sittin/63, 96  Standin/51, 100     The SpO2 was repeated with an ear probe and noted to be 89%.     GENERAL: Healthy, alert and no distress  Eyes: No xanthelasmas.  NECK: No palpable neck masses/goitre and no evidence of retrosternal goitre.   ENT: Moist mucosal membranes.  RESPIRATORY: No signs of resp distress. Lungs CTAB.  CARDIOVASCULAR:  No JVD, regular, normal S1+S2 2/6 ESM loudest at RUSB. No radiation to carotids.   ABDOMEN: ND, soft, non-tender.  EXTREMITIES: Warm, well-perfused, no edema.   NEUROLOGY: GCS 15/15, pleasant affect.  VASC: No carotid bruits. Radial, brachial,dorsalis pedis and posterior tibialis pulses are normal in volumes and symmetric bilaterally.   SKIN: No ecchymoses, no rashes.  PSYCH: Cooperative, pleasant affect.       Investigations:  I personally viewed and interpreted the following investigations:    Labs:    CBC RESULTS:  Lab Results   Component Value Date    WBC 7.0 2021    RBC 2.27 (L) 2021    HGB 8.1 (L) 2021    HCT 27.1 (L) 2021     (H) 2021    MCH 35.7 (H) 2021    MCHC 29.9 (L) 2021    RDW 18.5 (H) 2021     2021       CMP RESULTS:  Lab Results   Component Value Date     2021    POTASSIUM 4.3 2021    CHLORIDE 110 (H) 2021    CO2 22 2021    ANIONGAP 8 2021     (H) 2021    BUN 24 2021    CR 1.02 2021    GFRESTIMATED 78 2021    GFRESTBLACK >90 2021    NING 9.2 2021    BILITOTAL 3.1 (H) 2021    ALBUMIN 3.9 2021    ALKPHOS 87 2021    ALT 53 2021    AST 33 2021        Lab Results   Component Value Date    CHOL 107 2021     Lab Results   Component Value Date    HDL 39 2021     Lab Results "   Component Value Date    LDL 34 03/03/2021     Lab Results   Component Value Date    TRIG 171 03/03/2021     Lab Results   Component Value Date    CHOLHDLRATIO 4.8 05/07/2015       Recent Tests:    Electrocardiogram (03/26/2021):  NSR with occasional PVCs, ST depression and TWI diffusely.    Chest x-ray (03/26/2021):  No focal findings.     CTPA (05/31/2019):  FINDINGS: There is no pulmonary embolism. Lungs are clear of acute  infiltrates. The heart is not enlarged. There are moderate coronary  vascular calcifications consistent with coronary artery disease.  Thoracic aorta is atherosclerotic without evidence of dissection or  aneurysm. There is no pleural or pericardial effusion.  There is no  pneumothorax. Adrenal glands demonstrate minimal nodular thickening on  the left not significantly changed since June 21, 2017. There is  cholelithiasis without evidence for cholecystitis. Remainder of the  visualized upper abdomen is unremarkable.       Assessment and Plan:   Peterson Vazquez is a 60 year old male with a PMHx of diabetes mellitus, hypertension, dyslipidemia, and coronary disease (in the form of coronary artery calcifications) who presented today for evaluation of hypoxia.    Mr. Vazquez' hypoxia is striking given that his oxygen saturation declined as low as 85 while he was sitting comfortably and seemingly devoid of symptoms.  From a cardiovascular standpoint, the only focal sign or symptom of suspicion is the ejection systolic murmur that I heard in his examination.  Aside from this, he appears well compensated from a volume standpoint and not exhibiting signs of heart failure; chronic coronary disease or acute coronary syndrome; arrhythmias. The echocardiogram will also help to determine the etiology of the ST depression and TWI, for which there is not a clear explanation for on the history and examination. I did talk to Mr. Vazquez about inpatient/ER evaluation but he declined because he was feeling  well. I suggested that if he had signs of bleeding, acute and unrelenting dyspnea, or chest pain, that he consider ER evaluation.    Given that the only sign that I witnessed is a cardiac murmur, I will seek an echocardiogram.  We worked this up further with a chest x-ray and a CBC to ensure that there is no acute lung process or decline in his hemoglobin, respectively.  I have also ordered an ABG to determine the A-a gradient. The chest x-ray was unremarkable with a hemoglobin was notable for a marked drop from 13 in October 2020 to 8 today.  Additionally, there was a marked macrocytosis with MCV of 119. I discussed these results with the patient over the phone and with his PCP. He was not had hematemesis, hematuria, hematochezia, melena, hemoptysis, or any recent coffee-ground emesis. I again confirmed that the patient has not been drinking more than 1-2 drinks/week.    The other differential may be refractory sleep apnea causing daytime hypoxemia, which has been reported. He is seeing the Pulmonology team, where he will presumably be worked up for this.     - Coronary artery calcifications (moderate)  - Diabetes mellitus (non-insulin dependent)  - Hypertension  - Dyslipidemia    - Obtain TTE to evaluate for structural heart disease  - Follow-up with Pulmonology for AMERICA evaluation  - Follow-up with PCP for anemia evaluation     Chart review time: 20  Visit time: 60  Total time spent: 60  >50% of this 60-minute visit were spent with the patient on in-person counseling and discussion regarding hypoxia, CAC, hypertension, and dyslipidemia.     George Horton [facility] [place], [place]    Cardiovascular Division  Pager [phone]    CC  Patient Care Team:  Layla Murillo MD as PCP - General (Family Practice)  Layla Murillo MD as Assigned PCP  Clarissa Gonzalez RPH as Pharmacist (Pharmacist)

## 2021-03-26 NOTE — PATIENT INSTRUCTIONS
You were seen at the [facility] clinic today.   You saw Dr. George Horton, [facility], [place].    The following is a summary of your office visit today:    1. Echocardiogram, chest-xray, blood count evaluation to determine cause of low blood oxygen levels  2. Follow up with me in six weeks      If you have questions after this visit:   Send a [employer] message or contact Miles Zhou LPN (Cardiology Nurse contact)  Office:  [phone] option #1, then #4 & ask for Miles (nurse line)   Fax:  [phone]   Appointments:  [phone] option #1, then option #1     HOW TO CHECK YOUR BLOOD PRESSURE AT HOME:    Avoid eating, smoking, and exercising for at least 30 minutes before taking a reading.    Be sure you have taken your BP medication at least 2-3 hours before you check it.     Sit quietly for 10 minutes before a reading.     Sit in a chair with your feet flat on the floor. Rest your  arm on a table so that the arm cuff is at the same level as your heart.    Remain still during the reading.    Record your blood pressure and pulse in a log and bring to your next appointment.     If you have had any blood work, imaging or other testing completed we will be in touch within 1-2 weeks regarding the results. If you have any questions, concerns or need to schedule a follow up, please contact us at the numbers above. If you are needing refills please contact your pharmacy. For urgent after-hours care please call the the [facility] at [phone] (option #4) and ask to speak to the on-call Cardiologist.    It was a pleasure meeting with you today. Please let us know if there is anything else we can do for you so that we can be sure you are leaving completely satisfied with your care experience.     Your Cardiology Team at the [facility]

## 2021-03-29 ENCOUNTER — TELEPHONE (OUTPATIENT)
Dept: FAMILY MEDICINE | Facility: CLINIC | Age: 61
End: 2021-03-29

## 2021-03-29 LAB — INTERPRETATION ECG - MUSE: NORMAL

## 2021-03-29 NOTE — TELEPHONE ENCOUNTER
----- Message from Layla Murillo MD sent at 3/26/2021  2:46 PM CDT -----  Thanks so much for seeing him  That is a big drop  May need scopes and hematology consult if no answer found   I will tell him to stop his aspirin and make an apt with me after completes cardio and pulmonary work up  If symptomatic maybe should go to the ER  I will have our triage contact him on above plan   Layla  ----- Message -----  From: George Horton MD  Sent: 3/26/2021   2:24 PM CDT  To: Layla Murillo MD    Hb 8.1 from 13.3 with macrocytosis. This is a significant drop and is probably contributing to anemia. Mychart message sent.     Lacy Avery, MS    Cardiovascular Division  Pager [phone]

## 2021-04-02 NOTE — TELEPHONE ENCOUNTER
Patient has scheduled follow-up but not until 6/9/2021.  Has Echo 4/9/21  Sees Pulmonary 4/21/21  Sees Cards 5/7/21  [facility] 6/8/21  Dr Murillo 6/9/21    Patient also wants Dr Murillo to know that he has started Iron and B12 at the recommendation of cardiology will plan to stop ASA.  Patient also will consider moving the appointment with Dr Murillo in to May.  Karishma Bryant RN  [facility]

## 2021-04-09 ENCOUNTER — HOSPITAL ENCOUNTER (OUTPATIENT)
Dept: CARDIOLOGY | Facility: CLINIC | Age: 61
Discharge: HOME | End: 2021-04-09
Attending: STUDENT IN AN ORGANIZED HEALTH CARE EDUCATION/TRAINING PROGRAM | Admitting: STUDENT IN AN ORGANIZED HEALTH CARE EDUCATION/TRAINING PROGRAM
Payer: COMMERCIAL

## 2021-04-09 DIAGNOSIS — J96.01 ACUTE RESPIRATORY FAILURE WITH HYPOXIA (H): ICD-10-CM

## 2021-04-09 PROCEDURE — 93306 TTE W/DOPPLER COMPLETE: CPT | Mod: 26 | Performed by: INTERNAL MEDICINE

## 2021-04-09 PROCEDURE — 93306 TTE W/DOPPLER COMPLETE: CPT

## 2021-04-21 ENCOUNTER — PRE VISIT (OUTPATIENT)
Dept: PULMONOLOGY | Facility: CLINIC | Age: 61
End: 2021-04-21

## 2021-04-21 ENCOUNTER — OFFICE VISIT (OUTPATIENT)
Dept: PULMONOLOGY | Facility: CLINIC | Age: 61
End: 2021-04-21
Attending: FAMILY MEDICINE
Payer: COMMERCIAL

## 2021-04-21 VITALS
WEIGHT: 300 LBS | RESPIRATION RATE: 18 BRPM | SYSTOLIC BLOOD PRESSURE: 146 MMHG | OXYGEN SATURATION: 84 % | BODY MASS INDEX: 38.52 KG/M2 | DIASTOLIC BLOOD PRESSURE: 73 MMHG | HEART RATE: 100 BPM

## 2021-04-21 DIAGNOSIS — R79.81 BORDERLINE LOW OXYGEN SATURATION LEVEL: ICD-10-CM

## 2021-04-21 DIAGNOSIS — D74.9 METHEMOGLOBINEMIA: ICD-10-CM

## 2021-04-21 DIAGNOSIS — R05.9 COUGH: ICD-10-CM

## 2021-04-21 DIAGNOSIS — K21.9 GASTROESOPHAGEAL REFLUX DISEASE, UNSPECIFIED WHETHER ESOPHAGITIS PRESENT: ICD-10-CM

## 2021-04-21 DIAGNOSIS — D74.9 METHEMOGLOBINEMIA: Primary | ICD-10-CM

## 2021-04-21 LAB
BASE DEFICIT BLDA-SCNC: 0.4 MMOL/L
BASOPHILS # BLD AUTO: 0 10E9/L (ref 0–0.2)
BASOPHILS NFR BLD AUTO: 0.4 %
COHGB MFR BLD: 1.6 % (ref 0–2)
DIFFERENTIAL METHOD BLD: ABNORMAL
EOSINOPHIL # BLD AUTO: 0.1 10E9/L (ref 0–0.7)
EOSINOPHIL NFR BLD AUTO: 1.4 %
ERYTHROCYTE [DISTWIDTH] IN BLOOD BY AUTOMATED COUNT: 21.1 % (ref 10–15)
FERRITIN SERPL-MCNC: 238 NG/ML (ref 26–388)
FIO2-PRE: 21 %
HCO3 BLD-SCNC: 24 MMOL/L (ref 21–28)
HCT VFR BLD AUTO: 29.4 % (ref 40–53)
HGB BLD-MCNC: 8.9 G/DL (ref 13.3–17.7)
HGB BLD-MCNC: 9.2 G/DL (ref 13.3–17.7)
IMM GRANULOCYTES # BLD: 0.1 10E9/L (ref 0–0.4)
IMM GRANULOCYTES NFR BLD: 0.8 %
IRON SATN MFR SERPL: 31 % (ref 15–46)
IRON SERPL-MCNC: 88 UG/DL (ref 35–180)
LYMPHOCYTES # BLD AUTO: 1.7 10E9/L (ref 0.8–5.3)
LYMPHOCYTES NFR BLD AUTO: 17.5 %
MCH RBC QN AUTO: 34.6 PG (ref 26.5–33)
MCHC RBC AUTO-ENTMCNC: 30.3 G/DL (ref 31.5–36.5)
MCV RBC AUTO: 114 FL (ref 78–100)
METHGB MFR BLD: 2.8 % (ref 0–3)
MONOCYTES # BLD AUTO: 1 10E9/L (ref 0–1.3)
MONOCYTES NFR BLD AUTO: 9.9 %
NEUTROPHILS # BLD AUTO: 6.7 10E9/L (ref 1.6–8.3)
NEUTROPHILS NFR BLD AUTO: 70 %
NRBC # BLD AUTO: 0.1 10*3/UL
NRBC BLD AUTO-RTO: 1 /100
O2/TOTAL GAS SETTING VFR VENT: 21 %
OXYHGB MFR BLD: 79 % (ref 92–100)
PCO2 BLD: 37 MM HG (ref 35–45)
PH BLD: 7.42 PH (ref 7.35–7.45)
PLATELET # BLD AUTO: 216 10E9/L (ref 150–450)
PO2 BLD: 81 MM HG (ref 80–105)
RBC # BLD AUTO: 2.57 10E12/L (ref 4.4–5.9)
RETICS # AUTO: 332.6 10E9/L (ref 25–95)
RETICS/RBC NFR AUTO: 12.9 % (ref 0.5–2)
TIBC SERPL-MCNC: 284 UG/DL (ref 240–430)
WBC # BLD AUTO: 9.6 10E9/L (ref 4–11)

## 2021-04-21 PROCEDURE — 94726 PLETHYSMOGRAPHY LUNG VOLUMES: CPT

## 2021-04-21 PROCEDURE — 94729 DIFFUSING CAPACITY: CPT

## 2021-04-21 PROCEDURE — 36415 COLL VENOUS BLD VENIPUNCTURE: CPT

## 2021-04-21 PROCEDURE — 83550 IRON BINDING TEST: CPT | Performed by: PATHOLOGY

## 2021-04-21 PROCEDURE — G0463 HOSPITAL OUTPT CLINIC VISIT: HCPCS | Mod: 25

## 2021-04-21 PROCEDURE — 82955 ASSAY OF G6PD ENZYME: CPT | Mod: 90 | Performed by: PATHOLOGY

## 2021-04-21 PROCEDURE — 83050 HGB METHEMOGLOBIN QUAN: CPT

## 2021-04-21 PROCEDURE — 94060 EVALUATION OF WHEEZING: CPT

## 2021-04-21 PROCEDURE — 82728 ASSAY OF FERRITIN: CPT | Performed by: PATHOLOGY

## 2021-04-21 PROCEDURE — 85045 AUTOMATED RETICULOCYTE COUNT: CPT

## 2021-04-21 PROCEDURE — 999N001086 HC STATISTIC MORPHOLOGY W/INTERP HEMEPATH TC 85060

## 2021-04-21 PROCEDURE — 85025 COMPLETE CBC W/AUTO DIFF WBC: CPT

## 2021-04-21 PROCEDURE — 83540 ASSAY OF IRON: CPT | Performed by: PATHOLOGY

## 2021-04-21 PROCEDURE — 99205 OFFICE O/P NEW HI 60 MIN: CPT | Mod: 25 | Performed by: STUDENT IN AN ORGANIZED HEALTH CARE EDUCATION/TRAINING PROGRAM

## 2021-04-21 PROCEDURE — 36415 COLL VENOUS BLD VENIPUNCTURE: CPT | Performed by: PATHOLOGY

## 2021-04-21 PROCEDURE — 82805 BLOOD GASES W/O2 SATURATION: CPT

## 2021-04-21 PROCEDURE — 85018 HEMOGLOBIN: CPT

## 2021-04-21 PROCEDURE — 82375 ASSAY CARBOXYHB QUANT: CPT

## 2021-04-21 PROCEDURE — 85060 BLOOD SMEAR INTERPRETATION: CPT | Mod: 26 | Performed by: PATHOLOGY

## 2021-04-21 PROCEDURE — 99000 SPECIMEN HANDLING OFFICE-LAB: CPT | Performed by: PATHOLOGY

## 2021-04-21 RX ORDER — FLUTICASONE PROPIONATE 50 MCG
2 SPRAY, SUSPENSION (ML) NASAL DAILY
Qty: 18.2 ML | Refills: 3 | Status: SHIPPED | OUTPATIENT
Start: 2021-04-21

## 2021-04-21 ASSESSMENT — PAIN SCALES - GENERAL: PAINLEVEL: NO PAIN (0)

## 2021-04-21 NOTE — NURSING NOTE
Chief Complaint   Patient presents with     Consult     Chronic Cough      Medications reviewed and updated.  Vitals taken  Urszula Lara CMA

## 2021-04-21 NOTE — PROGRESS NOTES
"Pulmonary Clinic Note   4/21/2021     PCP: Layla Murillo    Reason for visit: hypoxemia    Pulmonary HPI:   Peterson Vazquez is a 60 year old male w/ h/o DMII, HTN, HLD, obesity, who presents for evaluation of dyspnea, hypoxemia, dizziness... referred by cardiology for AMERICA evaluation.     Noted at PCP clinic to have low O2 sat: documentation from note below  \"incidental borderline low Sat of 89 %, after took deep breaths and cleared collected phlegm in throat / upper airways it went to 91 %.  Does have environmental allergies & on an allergy pill daily, does have chronic post nasal drip and chronic runny nose and has AMERICA on CPAP managed by [facility]. Hx of prior provoked DVT when had cellulitis in leg few years ago. CT done for similar finding in 2019 showed no PE . Reports no current leg swelling asymmetry or pain and edema much improved with weight loss and use of compression socks. Hx of intermittent cough, more when in his basement, has been past yr working out of his home, no cough in past week. No symptoms now. On allergy pills all year long. No chest pain with activity. Consider changing ACE to ARB if cough work up reveals nothing  Due to borderline low SATs, chronic allergies, post nasal drip, intermittent cough many years and hx of sleep apnea advised to see pulmonary. \"      Patient reports getting sick frequently with URIs and low oxygen  Low oxygen started ~ 4-5 months ago. He feels well and only issues are chronic cough and PND (aside from the low spo2 documented in clinics and new anemia over the same time course)     When discussing new meds/suppliments, patient did endorse using Poppers (I.e. rush) for sexual encounters. He got  in November and started using these on a daily basis. He gets them online and uses them as directed (inhalation). The time course fits with his issues.   Does not use benzocaine cough drops or lidocaine patches.     Denies fever/chills, no hemoptysis or hematochezia. "     Chronic cough has been ongoing for years. Much worse when he has sinus issues. Takes claritin and zyrtec... told him to take just one of them. Not using flonase.  Cough is worse in the AM   GERD: does have symptoms intermittently, not on medications for this.   Uses CPAP at night    Patient is a never smoker. no EtOH use. no recreational drug use.   Patient lives in a house.  Patient works as a .     Patient's outside records were reviewed via [facility] &/or available paper records (sent for scanning).     Review of systems: a complete 12-point ROS conducted, & found to be negative w/ exceptions as noted in the HPI.    Past medical history:  Past Medical History:   Diagnosis Date     Cellulitis of left lower extremity 2019     Chronic acquired lymphedema 2019     Chronic rhinitis      Depressive disorder      DVT (deep venous thrombosis) (H)     R peroneal vein in 2016     History of depression      HTN (hypertension) 2013     Morbid obesity (H)      Other and unspecified hyperlipidemia      Type II or unspecified type diabetes mellitus without mention of complication, not stated as uncontrolled 7-05       Past Surgical History:   Procedure Laterality Date     HC TOOTH EXTRACTION W/FORCEP      WISDOM TEETH     HERNIA REPAIR      mesh     TONSILLECTOMY      TONSILS       Social history:  Social History     Tobacco Use     Smoking status: Never Smoker     Smokeless tobacco: Never Used   Substance Use Topics     Alcohol use: Yes     Alcohol/week: 0.0 standard drinks     Comment: 1 drink monthly     Drug use: No       Family history:  Family History   Problem Relation Age of Onset     Cancer Mother         CA brain, lung, starte din tear duct,  age 52     Other Cancer Mother      Cardiovascular Father         CABG @ 74, periph. vas. disease     Alzheimer Disease Father      Chronic Obstructive Pulmonary Disease Father      Other - See Comments Father         GBS      Depression Father      Cardiovascular Brother         MI, chf dx 40,  mi age 50      Obesity Brother      Depression Other      Cancer - colorectal No family hx of        Medications:  Current Outpatient Medications   Medication     ARIPiprazole (ABILIFY) 2 MG tablet     aspirin 81 MG EC tablet     atorvastatin (LIPITOR) 80 MG tablet     Blood Glucose Monitoring Suppl (BLOOD GLUCOSE METER) KIT     cholecalciferol (VITAMIN D) 1000 UNIT tablet     Cyanocobalamin (B-12) 1000 MCG TBCR     escitalopram (LEXAPRO) 10 MG tablet     fluticasone (FLONASE) 50 MCG/ACT nasal spray     lisinopril (ZESTRIL) 20 MG tablet     loratadine (CLARITIN) 10 MG tablet     magnesium gluconate (MAGONATE) 500 (27 Mg) MG tablet     metFORMIN (GLUCOPHAGE-XR) 500 MG 24 hr tablet     metroNIDAZOLE (METROGEL) 1 % external gel     Multiple Vitamin (DAILY MULTIVITAMIN PO)     omeprazole (PRILOSEC) 20 MG DR capsule     Turmeric (RA TURMERIC) 500 MG CAPS     No current facility-administered medications for this visit.        Allergies:  Allergies   Allergen Reactions     Fluoxetine Hcl      Prozac: anxiety       Physical examination:  BP (!) 146/73   Pulse 100   Resp 18   Wt 136.1 kg (300 lb)   SpO2 (!) 84%   BMI 38.52 kg/m      General: NAD  Eyes: Anicteric sclera, PERRL, EOMI  Mouth: MMM w/o lesions  Neck: No masses, no thyromegaly  Lymphatics: No significant cervical or supraclavicular LNs  CV: RRR, no m/c/r;   Lungs: clear bilaterally, normal air-movement  Abd: Soft, NT, ND  Ext: WWP, 2+ BLE edema. no clubbing  Skin: No rashes, cyanosis, or jaundice  Neuro: Intact mentation w/ normal speech. Normal strength & muscle tone w/ normal gait & stance  Psych: Euthymic, normal affect, good eye contact    Labs: reviewed in [employer] & personally interpreted.   Hgb 9.1  ABG 7.42 / 37 / 84 / 24  SaO2 (79) on RA    Imaging: reviewed in [employer] & personally interpreted. Below are the interpretations from the formal Radiology review.  ECHO 2021  The left  ventricle is normal in size. Proximal septal thickening is noted.  Left ventricular systolic function is normal. The visual ejection fraction is  estimated at 60-65%. No regional wall motion abnormalities noted.  The right ventricle is normal size. The right ventricular systolic function is  normal.  The left atrium is mildly dilated. The right atrium is mildly dilated. There  is no color Doppler evidence of an atrial shunt.  Trace to mild mitral regurgitation.  No pericardial effusion.  No previous study for comparison.    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 CO normal.   MetHgB level was slightly elevated at 2.8 (normal <1.5%) Patient started using poppers (Amyl nitrate) ~4 months ago... since then he has had low SpO2 and anemia... both of which have been reported with Popper use (via MetHgb and hemolysis).  Will have him stop poppers (has been using daily) and get Peripheral smear and check G6PD as well. He will get VitC over the counter to help with mild elevation in MetHgb. Lastly, SpO2 was 83-85% on RA... even with 6L O2 via NC... sats went to 89% at best and thus oxygen is not indicated at this time.    - stop poppers   - check G6PD   - peripheral smear + iron studies   - recheck CBC at next PCP visit   - 1000mg a day of Vit C (over the counter)   - no need for O2 given lack of response in clinic on 6l     Chronic Cough / allergies / PND   - claritin   - flonase    GERD   - omeprazole     AMERICA   - continue CPAP    Peterson was seen today for consult.    Diagnoses and all orders for this visit:    Methemoglobinemia  -     Glucose 6 phosphate dehydrogenase; Future  -     Blood Morphology Pathologist Review  -     CBC with platelets differential  -     Reticulocyte Count  -     Iron and iron binding capacity; Future  -     Ferritin; Future  -     fluticasone  (FLONASE) 50 MCG/ACT nasal spray; Spray 2 sprays into both nostrils daily  -     omeprazole (PRILOSEC) 20 MG DR capsule; Take 2 capsules (40 mg) by mouth daily    Cough  -     fluticasone (FLONASE) 50 MCG/ACT nasal spray; Spray 2 sprays into both nostrils daily  -     omeprazole (PRILOSEC) 20 MG DR capsule; Take 2 capsules (40 mg) by mouth daily    Gastroesophageal reflux disease, unspecified whether esophagitis present  -     omeprazole (PRILOSEC) 20 MG DR capsule; Take 2 capsules (40 mg) by mouth daily    Other orders  -     Blood gas arterial and oxyhgb  -     Carbon monoxide  -     Hemoglobin  -     Methemoglobin          RTC in 3 months    Patient was seen & discussed w/ Dr. Quintin MD, who is in agreement.    Corey Yang MD  Pulmonary & Critical Care  [phone]

## 2021-04-21 NOTE — LETTER
"    4/21/2021         RE: Peterson Vazquez  [address]  [facility] [address]        Dear Colleague,    Thank you for referring your patient, Peterson Vazquez, to the [facility] [place]. Please see a copy of my visit note below.    Pulmonary Clinic Note   4/21/2021     PCP: Layla Murillo    Reason for visit: hypoxemia    Pulmonary HPI:   Peterson Vazquez is a 60 year old male w/ h/o DMII, HTN, HLD, obesity, who presents for evaluation of dyspnea, hypoxemia, dizziness... referred by cardiology for AMERICA evaluation.     Noted at PCP clinic to have low O2 sat: documentation from note below  \"incidental borderline low Sat of 89 %, after took deep breaths and cleared collected phlegm in throat / upper airways it went to 91 %.  Does have environmental allergies & on an allergy pill daily, does have chronic post nasal drip and chronic runny nose and has AMERICA on CPAP managed by [facility]. Hx of prior provoked DVT when had cellulitis in leg few years ago. CT done for similar finding in 2019 showed no PE . Reports no current leg swelling asymmetry or pain and edema much improved with weight loss and use of compression socks. Hx of intermittent cough, more when in his basement, has been past yr working out of his home, no cough in past week. No symptoms now. On allergy pills all year long. No chest pain with activity. Consider changing ACE to ARB if cough work up reveals nothing  Due to borderline low SATs, chronic allergies, post nasal drip, intermittent cough many years and hx of sleep apnea advised to see pulmonary. \"      Patient reports getting sick frequently with URIs and low oxygen  Low oxygen started ~ 4-5 months ago. He feels well and only issues are chronic cough and PND (aside from the low spo2 documented in clinics and new anemia over the same time course)     When discussing new meds/suppliments, patient did endorse using Poppers (I.e. rush) for sexual " encounters. He got  in November and started using these on a daily basis. He gets them online and uses them as directed (inhalation). The time course fits with his issues.   Does not use benzocaine cough drops or lidocaine patches.     Denies fever/chills, no hemoptysis or hematochezia.     Chronic cough has been ongoing for years. Much worse when he has sinus issues. Takes claritin and zyrtec... told him to take just one of them. Not using flonase.  Cough is worse in the AM   GERD: does have symptoms intermittently, not on medications for this.   Uses CPAP at night    Patient is a never smoker. no EtOH use. no recreational drug use.   Patient lives in a house.  Patient works as a .     Patient's outside records were reviewed via [facility] Everywhere &/or available paper records (sent for scanning).     Review of systems: a complete 12-point ROS conducted, & found to be negative w/ exceptions as noted in the HPI.    Past medical history:  Past Medical History:   Diagnosis Date     Cellulitis of left lower extremity 1/18/2019     Chronic acquired lymphedema 1/19/2019     Chronic rhinitis      Depressive disorder      DVT (deep venous thrombosis) (H)     R peroneal vein in 2016     History of depression 2003     HTN (hypertension) 4/30/2013     Morbid obesity (H)      Other and unspecified hyperlipidemia      Type II or unspecified type diabetes mellitus without mention of complication, not stated as uncontrolled 7-05       Past Surgical History:   Procedure Laterality Date     HC TOOTH EXTRACTION W/FORCEP      WISDOM TEETH     HERNIA REPAIR  2011    mesh     TONSILLECTOMY      TONSILS       Social history:  Social History     Tobacco Use     Smoking status: Never Smoker     Smokeless tobacco: Never Used   Substance Use Topics     Alcohol use: Yes     Alcohol/week: 0.0 standard drinks     Comment: 1 drink monthly     Drug use: No       Family history:  Family History   Problem Relation Age of Onset      Cancer Mother         CA brain, lung, starte din tear duct,  age 52     Other Cancer Mother      Cardiovascular Father         CABG @ 74, periph. vas. disease     Alzheimer Disease Father      Chronic Obstructive Pulmonary Disease Father      Other - See Comments Father         GBS     Depression Father      Cardiovascular Brother         MI, chf dx 40,  mi age 50      Obesity Brother      Depression Other      Cancer - colorectal No family hx of        Medications:  Current Outpatient Medications   Medication     ARIPiprazole (ABILIFY) 2 MG tablet     aspirin 81 MG EC tablet     atorvastatin (LIPITOR) 80 MG tablet     Blood Glucose Monitoring Suppl (BLOOD GLUCOSE METER) KIT     cholecalciferol (VITAMIN D) 1000 UNIT tablet     Cyanocobalamin (B-12) 1000 MCG TBCR     escitalopram (LEXAPRO) 10 MG tablet     fluticasone (FLONASE) 50 MCG/ACT nasal spray     lisinopril (ZESTRIL) 20 MG tablet     loratadine (CLARITIN) 10 MG tablet     magnesium gluconate (MAGONATE) 500 (27 Mg) MG tablet     metFORMIN (GLUCOPHAGE-XR) 500 MG 24 hr tablet     metroNIDAZOLE (METROGEL) 1 % external gel     Multiple Vitamin (DAILY MULTIVITAMIN PO)     omeprazole (PRILOSEC) 20 MG DR capsule     Turmeric (RA TURMERIC) 500 MG CAPS     No current facility-administered medications for this visit.        Allergies:  Allergies   Allergen Reactions     Fluoxetine Hcl      Prozac: anxiety       Physical examination:  BP (!) 146/73   Pulse 100   Resp 18   Wt 136.1 kg (300 lb)   SpO2 (!) 84%   BMI 38.52 kg/m      General: NAD  Eyes: Anicteric sclera, PERRL, EOMI  Mouth: MMM w/o lesions  Neck: No masses, no thyromegaly  Lymphatics: No significant cervical or supraclavicular LNs  CV: RRR, no m/c/r;   Lungs: clear bilaterally, normal air-movement  Abd: Soft, NT, ND  Ext: WWP, 2+ BLE edema. no clubbing  Skin: No rashes, cyanosis, or jaundice  Neuro: Intact mentation w/ normal speech. Normal strength & muscle tone w/ normal gait &  stance  Psych: Euthymic, normal affect, good eye contact    Labs: reviewed in [employer] & personally interpreted.   Hgb 9.1  ABG 7.42 / 37 / 84 / 24  SaO2 (79) on RA    Imaging: reviewed in [employer] & personally interpreted. Below are the interpretations from the formal Radiology review.  ECHO 4/2021  The left ventricle is normal in size. Proximal septal thickening is noted.  Left ventricular systolic function is normal. The visual ejection fraction is  estimated at 60-65%. No regional wall motion abnormalities noted.  The right ventricle is normal size. The right ventricular systolic function is  normal.  The left atrium is mildly dilated. The right atrium is mildly dilated. There  is no color Doppler evidence of an atrial shunt.  Trace to mild mitral regurgitation.  No pericardial effusion.  No previous study for comparison.    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 CO normal.   MetHgB level was slightly elevated at 2.8 (normal <1.5%) Patient started using poppers (Amyl nitrate) ~4 months ago... since then he has had low SpO2 and anemia... both of which have been reported with Popper use (via MetHgb and hemolysis).  Will have him stop poppers (has been using daily) and get Peripheral smear and check G6PD as well. He will get VitC over the counter to help with mild elevation in MetHgb. Lastly, SpO2 was 83-85% on RA... even with 6L O2 via NC... sats went to 89% at best and thus oxygen is not indicated at this time.    - stop poppers   - check G6PD   - peripheral smear + iron studies   - recheck CBC at next PCP visit   - 1000mg a day of Vit C (over the counter)   - no need for O2 given lack of response in clinic on 6l     Chronic Cough / allergies / PND   - claritin   - flonase    GERD   - omeprazole     AMERICA   - continue CPAP    Peterson was seen today for  "consult.    Diagnoses and all orders for this visit:    Methemoglobinemia  -     Glucose 6 phosphate dehydrogenase; Future  -     Blood Morphology Pathologist Review  -     CBC with platelets differential  -     Reticulocyte Count  -     Iron and iron binding capacity; Future  -     Ferritin; Future  -     fluticasone (FLONASE) 50 MCG/ACT nasal spray; Spray 2 sprays into both nostrils daily  -     omeprazole (PRILOSEC) 20 MG DR capsule; Take 2 capsules (40 mg) by mouth daily    Cough  -     fluticasone (FLONASE) 50 MCG/ACT nasal spray; Spray 2 sprays into both nostrils daily  -     omeprazole (PRILOSEC) 20 MG DR capsule; Take 2 capsules (40 mg) by mouth daily    Gastroesophageal reflux disease, unspecified whether esophagitis present  -     omeprazole (PRILOSEC) 20 MG DR capsule; Take 2 capsules (40 mg) by mouth daily    Other orders  -     Blood gas arterial and oxyhgb  -     Carbon monoxide  -     Hemoglobin  -     Methemoglobin          RTC in 3 months    Patient was seen & discussed w/ Dr. Quintin MD, who is in agreement.    Corey Yang MD  Pulmonary & Critical Care  [phone]      Attestation signed by Santos Ribeiro MD at 4/27/2021  9:44 AM:  Physician Attestation   I, Santos Ribeiro MD, saw this patient and agree with the findings and plan of care as documented in the note.      Items personally reviewed/procedural attestation: vitals, labs, imaging and agree with the interpretation documented in the note, and spirometry report and agree with the interpretation documented in the note.    Suspected hemolytic anemia, and hypoxemia due to methemoglobinemia from \"poppers.\"  Although his methemoglobin level was only slightly elevated, it makes the most sense with his anemia with elevated MCV (likely reticulocytosis) normal pulmonary function testing and chest imaging.  The clinical timing fits as well.  He started using poppers a few months ago.  Just prior to that, he did not have any " anemia.     He was placed on 6 L oxygen in the clinic and his O2 saturation did not rise above 89%.     Will check hemolysis labs, G6PD and iron studies.  Will recheck CBC in about 1 week.    Santos Ribeiro MD       Again, thank you for allowing me to participate in the care of your patient.        Sincerely,        Corey Yang MD

## 2021-04-21 NOTE — PATIENT INSTRUCTIONS
Use flonase each day (prescription sent)  Use omeprazole each day (prescription sent)  Lab work today  Stop poppers

## 2021-04-22 LAB — COPATH REPORT: NORMAL

## 2021-04-23 ENCOUNTER — IMMUNIZATION (OUTPATIENT)
Dept: NURSING | Facility: CLINIC | Age: 61
End: 2021-04-23
Payer: COMMERCIAL

## 2021-04-23 LAB — G6PD RBC-CCNC: 16.9 U/G HB (ref 9.9–16.6)

## 2021-04-23 PROCEDURE — 0001A PR COVID VAC PFIZER DIL RECON 30 MCG/0.3 ML IM: CPT

## 2021-04-23 PROCEDURE — 91300 PR COVID VAC PFIZER DIL RECON 30 MCG/0.3 ML IM: CPT

## 2021-04-24 LAB
DLCOCOR-%PRED-PRE: 128 %
DLCOCOR-PRE: 38.3 ML/MIN/MMHG
DLCOUNC-%PRED-PRE: 101 %
DLCOUNC-PRE: 30.36 ML/MIN/MMHG
DLCOUNC-PRED: 29.86 ML/MIN/MMHG
ERV-%PRED-PRE: 163 %
ERV-PRE: 1.2 L
ERV-PRED: 0.74 L
EXPTIME-PRE: 8.21 SEC
FEF2575-%PRED-POST: 77 %
FEF2575-%PRED-PRE: 73 %
FEF2575-POST: 2.39 L/SEC
FEF2575-PRE: 2.27 L/SEC
FEF2575-PRED: 3.09 L/SEC
FEFMAX-%PRED-PRE: 77 %
FEFMAX-PRE: 7.53 L/SEC
FEFMAX-PRED: 9.76 L/SEC
FEV1-%PRED-PRE: 73 %
FEV1-PRE: 2.85 L
FEV1FEV6-PRE: 76 %
FEV1FEV6-PRED: 79 %
FEV1FVC-PRE: 75 %
FEV1FVC-PRED: 77 %
FEV1SVC-PRE: 67 %
FEV1SVC-PRED: 70 %
FIFMAX-PRE: 4.9 L/SEC
FIO2-PRE: 21 %
FRCPLETH-%PRED-PRE: 104 %
FRCPLETH-PRE: 3.98 L
FRCPLETH-PRED: 3.81 L
FVC-%PRED-PRE: 74 %
FVC-PRE: 3.78 L
FVC-PRED: 5.05 L
IC-%PRED-PRE: 65 %
IC-PRE: 3.08 L
IC-PRED: 4.74 L
RVPLETH-%PRED-PRE: 108 %
RVPLETH-PRE: 2.77 L
RVPLETH-PRED: 2.56 L
TLCPLETH-%PRED-PRE: 91 %
TLCPLETH-PRE: 7.06 L
TLCPLETH-PRED: 7.74 L
VA-%PRED-PRE: 87 %
VA-PRE: 6.32 L
VC-%PRED-PRE: 78 %
VC-PRE: 4.29 L
VC-PRED: 5.47 L

## 2021-04-27 DIAGNOSIS — I10 ESSENTIAL HYPERTENSION: Chronic | ICD-10-CM

## 2021-04-28 RX ORDER — LISINOPRIL 20 MG/1
TABLET ORAL
Qty: 90 TABLET | Refills: 0 | Status: SHIPPED | OUTPATIENT
Start: 2021-04-28 | End: 2021-07-29

## 2021-05-07 ENCOUNTER — VIRTUAL VISIT (OUTPATIENT)
Dept: CARDIOLOGY | Facility: CLINIC | Age: 61
End: 2021-05-07
Attending: STUDENT IN AN ORGANIZED HEALTH CARE EDUCATION/TRAINING PROGRAM
Payer: COMMERCIAL

## 2021-05-07 DIAGNOSIS — R42 DIZZINESS: ICD-10-CM

## 2021-05-07 DIAGNOSIS — I25.10 CORONARY ARTERY CALCIFICATION SEEN ON CAT SCAN: ICD-10-CM

## 2021-05-07 DIAGNOSIS — R79.81 BORDERLINE LOW OXYGEN SATURATION LEVEL: ICD-10-CM

## 2021-05-07 DIAGNOSIS — J96.01 ACUTE RESPIRATORY FAILURE WITH HYPOXIA (H): ICD-10-CM

## 2021-05-07 PROCEDURE — 99215 OFFICE O/P EST HI 40 MIN: CPT | Mod: 95 | Performed by: STUDENT IN AN ORGANIZED HEALTH CARE EDUCATION/TRAINING PROGRAM

## 2021-05-07 NOTE — PROGRESS NOTES
"Adrian is a 60 year old who is being evaluated via a billable video visit.      How would you like to obtain your AVS? MyChart  If the video visit is dropped, the invitation should be resent by: Send to e-mail at: [email or web address]  Will anyone else be joining your video visit? No    Vitals - Patient Reported  Weight (Patient Reported): 135.6 kg (299 lb)  Height (Patient Reported): 188 cm (6' 2\")  BMI (Based on Pt Reported Ht/Wt): 38.39  Pain Score: No Pain (0)  Pain Loc: Chest    Adrian is a 60 year old who is being evaluated via a billable video visit.      How would you like to obtain your AVS? MyChart  If the video visit is dropped, the invitation should be resent by: Send to e-mail at: [email or web address]  Will anyone else be joining your video visit? No      Video Start Time: 1022 AM 05/07/2021    Video-Visit Details    Type of service:  Video Visit    Video End Time: 10:44 AM 05/07/2021    Originating Location (pt. Location): Home    Distant Location (provider location):  [facility]     Platform used for Video Visit: Ben             Chief Complaint: Evaluation of coronary artery calcifications and dizziness     HPI: Mr. Peterson Vazquez is a pleasant 60-year-old male with a background history of noninsulin-dependent type 2 diabetes mellitus, hypertension, dyslipidemia, obesity, and a prior history of a single popliteal vein deep venous thrombosis in 2016 who presented in referral from Dr. Layla Murillo for evaluation of dizziness, hypoxia and coronary artery calcifications.    Briefly, Mr. Vazquez was seen by my colleague Dr. Xi Sotomayor in May 2019 for evaluation of coronary artery calcifications.  At this time, his atorvastatin dosage was increased to 80 mg once daily and he was also noted to be mildly hypertensive so his lisinopril was increased to 20 mg once daily.  Since then, he has continued on these doses.  He has also been pursuing intentional weight loss and " "exercise, however, this has been inhibited by the pandemic restrictions.    Since then, Mr. Vazquez had an episode of dizziness.  Mr. Vazquez described this as a vertiginous sensation with the room spinning in front of him. He describes having this in December and it lasting for approximately 24 hours.  It resolved spontaneously.  It had never occurred prior to his episode and has never occurred since.  Mr. Vazquez did not have any recent viral infections or contact with anyone with COVID-19 at the time.    Mr. Vazquez was seen in clinic recently where he was noted to have hypoxia with oxygen saturation approximately 89%.  After taking a few deep breaths he was noted to have 92%.  This hypoxia prompted referral to cardiology given that it was new.    At the time of the consultation, he denies any chest pain, dyspnea at rest or with exertion, PND, orthopnea, peripheral edema, palpitations, lightheadedness or syncope.  Moreover, he does not have any recent hemoptysis, leg swelling/pain/calf erythema, sputum purulence.  Mr. Vazquez notes that he is highly adherent to his CPAP regimen because it helps him sleep better.  Finally, Mr. Ruth notes that he has had a cough productive of few teaspoons of clear sputum on a daily basis for \"over 20 years\".  The volume in nature have not changed in this time aside from exacerbations, which are presumably related to viral upper respiratory tract infections.    A comprehensive ROS was done and the details are included above in the HPI.    Interval History 05/07/2021:  Since her last visit, Mr. Vazquez was seen by the pulmonary team.  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  Upon further questioning, it was noted that he was using a stimulant called \"Poppers\" frequently.  This is associate with a rare side effect of methemoglobinemia.  The pulmonary team subsequent asked Mr. Vazquez to stop the \"Poppers\" and he had marked improvement in his dyspnea on " exertion. He notes that he had been using these for over 10 years but the frequency had increased in recent months.    Alongside this, Mr. Vazquez notes that he has had an absence of chest pain, PND/orthopnea, or peripheral edema.  He is currently having his home renovated and [place] is recently from.    A comprehensive review of systems was done and is otherwise negative.    Past Medical History:  - T2DM (NIDDM)  - DVT  - Hypertension  - No prior ACS/MI, TIA/stroke, peripheral arterial disease, or aneurysms.    Past Medical History:   Diagnosis Date     Cellulitis of left lower extremity 1/18/2019     Chronic acquired lymphedema 1/19/2019     Chronic rhinitis      Depressive disorder      DVT (deep venous thrombosis) (H)     R peroneal vein in 2016     History of depression 2003     HTN (hypertension) 4/30/2013     Morbid obesity (H)      Other and unspecified hyperlipidemia      Type II or unspecified type diabetes mellitus without mention of complication, not stated as uncontrolled 7-05       Past Surgical History:    Past Surgical History:   Procedure Laterality Date     HC TOOTH EXTRACTION W/FORCEP      WISDOM TEETH     HERNIA REPAIR  2011    mesh     TONSILLECTOMY      TONSILS       Drug History:  Home cardiac meds: ASA 81, atorvastatin 80 mg q24h, lisinopril 20 mg q24h. No over-the-counter medications aside from turmeric and B12.  Current Outpatient Medications   Medication Sig Dispense Refill     ARIPiprazole (ABILIFY) 2 MG tablet Take 1 tablet (2 mg) by mouth daily       atorvastatin (LIPITOR) 80 MG tablet TAKE 1 TABLET DAILY. DUE FOR APPOINTMENT SEPTEMBER 2020, NO FURTHER REFILLS 90 tablet 3     Blood Glucose Monitoring Suppl (BLOOD GLUCOSE METER) KIT 1 Device daily 1 kit 2     cholecalciferol (VITAMIN D) 1000 UNIT tablet Take 1 tablet (1,000 Units) by mouth daily 100 tablet 3     Cyanocobalamin (B-12) 1000 MCG TBCR Take 1,000 mcg by mouth daily 100 tablet 1      escitalopram (LEXAPRO) 10 MG tablet TAKE 1 TABLET DAILY (FURTHER RECOMMENDATIONS BY PSYCHE) 90 tablet 0     fluticasone (FLONASE) 50 MCG/ACT nasal spray Spray 2 sprays into both nostrils daily 18.2 mL 3     lisinopril (ZESTRIL) 20 MG tablet TAKE 1 TABLET DAILY 90 tablet 0     loratadine (CLARITIN) 10 MG tablet Take 10 mg by mouth At Bedtime       magnesium gluconate (MAGONATE) 500 (27 Mg) MG tablet Take 500 mg by mouth At Bedtime       metFORMIN (GLUCOPHAGE-XR) 500 MG 24 hr tablet Take 2 tablets (1,000 mg) by mouth 2 times daily (with meals) 360 tablet 3     Multiple Vitamin (DAILY MULTIVITAMIN PO) Take 1 tablet by mouth At Bedtime        omeprazole (PRILOSEC) 20 MG DR capsule Take 2 capsules (40 mg) by mouth daily 180 capsule 0     Turmeric (RA TURMERIC) 500 MG CAPS Take 1 capsule by mouth daily       aspirin 81 MG EC tablet Take 1 tablet (81 mg) by mouth daily (Patient not taking: Reported on 2021) 90 tablet 3     metroNIDAZOLE (METROGEL) 1 % external gel Apply topically daily (Patient not taking: Reported on 2021) 60 g 11     Family History:  - Father with bypass grafting at age 74  - Brother diagnosed with end-stage heart failure in 40s and  at age 50.  The etiology of the cardiomyopathy was unclear.  Family History   Problem Relation Age of Onset     Cancer Mother         CA brain, lung, starte din tear duct,  age 52     Other Cancer Mother      Cardiovascular Father         CABG @ 74, periph. vas. disease     Alzheimer Disease Father      Chronic Obstructive Pulmonary Disease Father      Other - See Comments Father         GBS     Depression Father      Cardiovascular Brother         MI, chf dx 40,  mi age 50      Obesity Brother      Depression Other      Cancer - colorectal No family hx of      Social History:  - Works as an    - Never smoker  - Recent divorce  - Drinks 1-2 drinks/week and drank about a drink daily soon after divorce   - No illicit substance usage.  Social  History     Tobacco Use     Smoking status: Never Smoker     Smokeless tobacco: Never Used   Substance Use Topics     Alcohol use: Yes     Alcohol/week: 0.0 standard drinks     Comment: 1 drink monthly       Allergies   Allergen Reactions     Fluoxetine Hcl      Prozac: anxiety         Physical Examination:  Vitals: There were no vitals taken for this visit. (video visit)    GENERAL: Healthy, alert and no distress  Eyes: No xanthelasmas.  NECK: No evidence of retrosternal goitre.   ENT: Moist mucosal membranes.  RESPIRATORY: No signs of resp distress  CARDIOVASCULAR:  No JVD.  ABDOMEN: ND, soft, non-tender.  SKIN: No ecchymoses, no rashes.  PSYCH: Cooperative, pleasant affect.       Investigations:  I personally viewed and interpreted the following investigations:    Labs:    CBC RESULTS:  Lab Results   Component Value Date    WBC 9.6 04/21/2021    RBC 2.57 (L) 04/21/2021    HGB 8.9 (L) 04/21/2021    HCT 29.4 (L) 04/21/2021     (H) 04/21/2021    MCH 34.6 (H) 04/21/2021    MCHC 30.3 (L) 04/21/2021    RDW 21.1 (H) 04/21/2021     04/21/2021       CMP RESULTS:  Lab Results   Component Value Date     03/03/2021    POTASSIUM 4.3 03/03/2021    CHLORIDE 110 (H) 03/03/2021    CO2 22 03/03/2021    ANIONGAP 8 03/03/2021     (H) 03/03/2021    BUN 24 03/03/2021    CR 1.02 03/03/2021    GFRESTIMATED 78 03/03/2021    GFRESTBLACK >90 03/03/2021    NING 9.2 03/03/2021    BILITOTAL 3.1 (H) 03/03/2021    ALBUMIN 3.9 03/03/2021    ALKPHOS 87 03/03/2021    ALT 53 03/03/2021    AST 33 03/03/2021        Lab Results   Component Value Date    CHOL 107 03/03/2021     Lab Results   Component Value Date    HDL 39 03/03/2021     Lab Results   Component Value Date    LDL 34 03/03/2021     Lab Results   Component Value Date    TRIG 171 03/03/2021     Lab Results   Component Value Date    CHOLHDLRATIO 4.8 05/07/2015       Recent Tests:    Electrocardiogram (03/26/2021):  NSR with occasional PVCs, ST depression and TWI  diffusely.    Chest x-ray (03/26/2021):  No focal findings.     CTPA (05/31/2019):  FINDINGS: There is no pulmonary embolism. Lungs are clear of acute  infiltrates. The heart is not enlarged. There are moderate coronary  vascular calcifications consistent with coronary artery disease.  Thoracic aorta is atherosclerotic without evidence of dissection or  aneurysm. There is no pleural or pericardial effusion.  There is no  pneumothorax. Adrenal glands demonstrate minimal nodular thickening on  the left not significantly changed since June 21, 2017. There is  cholelithiasis without evidence for cholecystitis. Remainder of the  visualized upper abdomen is unremarkable.       Assessment and Plan:   Peterson Vazquez is a 60 year old male with a PMHx of diabetes mellitus, hypertension, dyslipidemia, and coronary disease (in the form of coronary artery calcifications) who originally presented for evaluation of hypoxia.    Given that Mr. Vazquez has had an improvement in his symptoms and that the methemoglobinemia can explain his symptoms, my view is that there is no need for further investigations. His TTE revealed an absence of structural heart disease, which is also reassuring.     Problems:  - Coronary artery calcifications (moderate)  - Diabetes mellitus (non-insulin dependent)  - Hypertension  - Dyslipidemia    - No further investigations needed at present  - Continue current medical regimen of ASA 81, atorvastatin 80 mg q24h, lisinopril 20 mg q24h  - Follow up as needed with me     Chart review time: 20  Visit time: 22  Total time spent: 42  >50% of this 42-minute visit were spent with the patient on in-person counseling and discussion regarding hypoxia,methemoglobinemia, TTE results.     George Horton [facility] [place], [place]    Cardiovascular Division  Pager [phone]    CC  Patient Care Team:  Layla Murillo MD as PCP - General (Family Practice)  Layla Murillo MD as Assigned PCP  Clarissa Gonzalez RPH as Pharmacist  (Pharmacist)  George Horton MD as Assigned Heart and Vascular Provider

## 2021-05-07 NOTE — LETTER
"5/7/2021      RE: Peterson Vazquez  [address]  [facility] [id number]       Dear Colleague,    Thank you for the opportunity to participate in the care of your patient, Peterson Vazquez, at the [facility] at [facility]. Please see a copy of my visit note below.    Adrian is a 60 year old who is being evaluated via a billable video visit.      How would you like to obtain your AVS? MyChart  If the video visit is dropped, the invitation should be resent by: Send to e-mail at: [email or web address]  Will anyone else be joining your video visit? No    Vitals - Patient Reported  Weight (Patient Reported): 135.6 kg (299 lb)  Height (Patient Reported): 188 cm (6' 2\")  BMI (Based on Pt Reported Ht/Wt): 38.39  Pain Score: No Pain (0)  Pain Loc: Lary Campbell is a 60 year old who is being evaluated via a billable video visit.      How would you like to obtain your AVS? MyChart  If the video visit is dropped, the invitation should be resent by: Send to e-mail at: [email or web address]  Will anyone else be joining your video visit? No      Video Start Time: 1022 AM 05/07/2021    Video-Visit Details    Type of service:  Video Visit    Video End Time: 10:44 AM 05/07/2021    Originating Location (pt. Location): Home    Distant Location (provider location):  [facility]     Platform used for Video Visit: Ben             Chief Complaint: Evaluation of coronary artery calcifications and dizziness     HPI: Mr. Peterson Vazquez is a pleasant 60-year-old male with a background history of noninsulin-dependent type 2 diabetes mellitus, hypertension, dyslipidemia, obesity, and a prior history of a single popliteal vein deep venous thrombosis in 2016 who presented in referral from Dr. Layla Murillo for evaluation of dizziness, hypoxia and coronary artery calcifications.    Briefly, Mr. Vazquez was seen by my colleague Dr." "Xi Sotomayor in May 2019 for evaluation of coronary artery calcifications.  At this time, his atorvastatin dosage was increased to 80 mg once daily and he was also noted to be mildly hypertensive so his lisinopril was increased to 20 mg once daily.  Since then, he has continued on these doses.  He has also been pursuing intentional weight loss and exercise, however, this has been inhibited by the pandemic restrictions.    Since then, Mr. Vazquez had an episode of dizziness.  Mr. Vazquez described this as a vertiginous sensation with the room spinning in front of him. He describes having this in December and it lasting for approximately 24 hours.  It resolved spontaneously.  It had never occurred prior to his episode and has never occurred since.  Mr. Vazquez did not have any recent viral infections or contact with anyone with COVID-19 at the time.    Mr. Vazquez was seen in clinic recently where he was noted to have hypoxia with oxygen saturation approximately 89%.  After taking a few deep breaths he was noted to have 92%.  This hypoxia prompted referral to cardiology given that it was new.    At the time of the consultation, he denies any chest pain, dyspnea at rest or with exertion, PND, orthopnea, peripheral edema, palpitations, lightheadedness or syncope.  Moreover, he does not have any recent hemoptysis, leg swelling/pain/calf erythema, sputum purulence.  Mr. Vazquez notes that he is highly adherent to his CPAP regimen because it helps him sleep better.  Finally, Mr. Ruth notes that he has had a cough productive of few teaspoons of clear sputum on a daily basis for \"over 20 years\".  The volume in nature have not changed in this time aside from exacerbations, which are presumably related to viral upper respiratory tract infections.    A comprehensive ROS was done and the details are included above in the HPI.    Interval History 05/07/2021:  Since her last visit, Mr. Vazquez was seen by the pulmonary team.  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  Upon further questioning, it was noted that he was using a stimulant called \"Poppers\" frequently.  This is associate with a rare side effect of methemoglobinemia.  The pulmonary team subsequent asked Mr. Vazquez to stop the \"Poppers\" and he had marked improvement in his dyspnea on exertion. He notes that he had been using these for over 10 years but the frequency had increased in recent months.    Alongside this, Mr. Vazquez notes that he has had an absence of chest pain, PND/orthopnea, or peripheral edema.  He is currently having his home renovated and planning to [place] he is recently from.    A comprehensive review of systems was done and is otherwise negative.    Past Medical History:  - T2DM (NIDDM)  - DVT  - Hypertension  - No prior ACS/MI, TIA/stroke, peripheral arterial disease, or aneurysms.    Past Medical History:   Diagnosis Date     Cellulitis of left lower extremity 1/18/2019     Chronic acquired lymphedema 1/19/2019     Chronic rhinitis      Depressive disorder      DVT (deep venous thrombosis) (H)     R peroneal vein in 2016     History of depression 2003     HTN (hypertension) 4/30/2013     Morbid obesity (H)      Other and unspecified hyperlipidemia      Type II or unspecified type diabetes mellitus without mention of complication, not stated as uncontrolled 7-05       Past Surgical History:    Past Surgical History:   Procedure Laterality Date     HC TOOTH EXTRACTION W/FORCEP      WISDOM TEETH     HERNIA REPAIR  2011    mesh     TONSILLECTOMY      TONSILS       Drug History:  Home cardiac meds: ASA 81, atorvastatin 80 mg q24h, lisinopril 20 mg q24h. No over-the-counter medications aside from turmeric and B12.  Current Outpatient Medications   Medication Sig Dispense Refill     ARIPiprazole (ABILIFY) 2 MG tablet Take 1 tablet (2 mg) by mouth daily       atorvastatin (LIPITOR) 80 MG tablet TAKE 1 TABLET DAILY. " DUE FOR APPOINTMENT 2020, NO FURTHER REFILLS 90 tablet 3     Blood Glucose Monitoring Suppl (BLOOD GLUCOSE METER) KIT 1 Device daily 1 kit 2     cholecalciferol (VITAMIN D) 1000 UNIT tablet Take 1 tablet (1,000 Units) by mouth daily 100 tablet 3     Cyanocobalamin (B-12) 1000 MCG TBCR Take 1,000 mcg by mouth daily 100 tablet 1     escitalopram (LEXAPRO) 10 MG tablet TAKE 1 TABLET DAILY (FURTHER RECOMMENDATIONS BY PSYCHE) 90 tablet 0     fluticasone (FLONASE) 50 MCG/ACT nasal spray Spray 2 sprays into both nostrils daily 18.2 mL 3     lisinopril (ZESTRIL) 20 MG tablet TAKE 1 TABLET DAILY 90 tablet 0     loratadine (CLARITIN) 10 MG tablet Take 10 mg by mouth At Bedtime       magnesium gluconate (MAGONATE) 500 (27 Mg) MG tablet Take 500 mg by mouth At Bedtime       metFORMIN (GLUCOPHAGE-XR) 500 MG 24 hr tablet Take 2 tablets (1,000 mg) by mouth 2 times daily (with meals) 360 tablet 3     Multiple Vitamin (DAILY MULTIVITAMIN PO) Take 1 tablet by mouth At Bedtime        omeprazole (PRILOSEC) 20 MG DR capsule Take 2 capsules (40 mg) by mouth daily 180 capsule 0     Turmeric (RA TURMERIC) 500 MG CAPS Take 1 capsule by mouth daily       aspirin 81 MG EC tablet Take 1 tablet (81 mg) by mouth daily (Patient not taking: Reported on 2021) 90 tablet 3     metroNIDAZOLE (METROGEL) 1 % external gel Apply topically daily (Patient not taking: Reported on 2021) 60 g 11     Family History:  - Father with bypass grafting at age 74  - Brother diagnosed with end-stage heart failure in 40s and  at age 50.  The etiology of the cardiomyopathy was unclear.  Family History   Problem Relation Age of Onset     Cancer Mother         CA brain, lung, starte din tear duct,  age 52     Other Cancer Mother      Cardiovascular Father         CABG @ 74, periph. vas. disease     Alzheimer Disease Father      Chronic Obstructive Pulmonary Disease Father      Other - See Comments Father         GBS     Depression Father       Cardiovascular Brother         MI, chf dx 40,  mi age 50      Obesity Brother      Depression Other      Cancer - colorectal No family hx of      Social History:  - Works as an    - Never smoker  - Recent divorce  - Drinks 1-2 drinks/week and drank about a drink daily soon after divorce   - No illicit substance usage.  Social History     Tobacco Use     Smoking status: Never Smoker     Smokeless tobacco: Never Used   Substance Use Topics     Alcohol use: Yes     Alcohol/week: 0.0 standard drinks     Comment: 1 drink monthly       Allergies   Allergen Reactions     Fluoxetine Hcl      Prozac: anxiety         Physical Examination:  Vitals: There were no vitals taken for this visit. (video visit)    GENERAL: Healthy, alert and no distress  Eyes: No xanthelasmas.  NECK: No evidence of retrosternal goitre.   ENT: Moist mucosal membranes.  RESPIRATORY: No signs of resp distress  CARDIOVASCULAR:  No JVD.  ABDOMEN: ND, soft, non-tender.  SKIN: No ecchymoses, no rashes.  PSYCH: Cooperative, pleasant affect.       Investigations:  I personally viewed and interpreted the following investigations:    Labs:    CBC RESULTS:  Lab Results   Component Value Date    WBC 9.6 2021    RBC 2.57 (L) 2021    HGB 8.9 (L) 2021    HCT 29.4 (L) 2021     (H) 2021    MCH 34.6 (H) 2021    MCHC 30.3 (L) 2021    RDW 21.1 (H) 2021     2021       CMP RESULTS:  Lab Results   Component Value Date     2021    POTASSIUM 4.3 2021    CHLORIDE 110 (H) 2021    CO2 22 2021    ANIONGAP 8 2021     (H) 2021    BUN 24 2021    CR 1.02 2021    GFRESTIMATED 78 2021    GFRESTBLACK >90 2021    NING 9.2 2021    BILITOTAL 3.1 (H) 2021    ALBUMIN 3.9 2021    ALKPHOS 87 2021    ALT 53 2021    AST 33 2021        Lab Results   Component Value Date    CHOL 107 2021     Lab Results    Component Value Date    HDL 39 03/03/2021     Lab Results   Component Value Date    LDL 34 03/03/2021     Lab Results   Component Value Date    TRIG 171 03/03/2021     Lab Results   Component Value Date    CHOLHDLRATIO 4.8 05/07/2015       Recent Tests:    Electrocardiogram (03/26/2021):  NSR with occasional PVCs, ST depression and TWI diffusely.    Chest x-ray (03/26/2021):  No focal findings.     CTPA (05/31/2019):  FINDINGS: There is no pulmonary embolism. Lungs are clear of acute  infiltrates. The heart is not enlarged. There are moderate coronary  vascular calcifications consistent with coronary artery disease.  Thoracic aorta is atherosclerotic without evidence of dissection or  aneurysm. There is no pleural or pericardial effusion.  There is no  pneumothorax. Adrenal glands demonstrate minimal nodular thickening on  the left not significantly changed since June 21, 2017. There is  cholelithiasis without evidence for cholecystitis. Remainder of the  visualized upper abdomen is unremarkable.       Assessment and Plan:   Peterson Vazquez is a 60 year old male with a PMHx of diabetes mellitus, hypertension, dyslipidemia, and coronary disease (in the form of coronary artery calcifications) who originally presented for evaluation of hypoxia.    Given that Mr. Vazquez has had an improvement in his symptoms and that the methemoglobinemia can explain his symptoms, my view is that there is no need for further investigations. His TTE revealed an absence of structural heart disease, which is also reassuring.     Problems:  - Coronary artery calcifications (moderate)  - Diabetes mellitus (non-insulin dependent)  - Hypertension  - Dyslipidemia    - No further investigations needed at present  - Continue current medical regimen of ASA 81, atorvastatin 80 mg q24h, lisinopril 20 mg q24h  - Follow up as needed with me     Chart review time: 20  Visit time: 22  Total time spent: 42  >50% of this 42-minute visit were spent  with the patient on in-person counseling and discussion regarding hypoxia,methemoglobinemia, TTE results.     George Horton, [facility], [place]    Cardiovascular Division  Pager [phone]    CC  Patient Care Team:  Layla Murillo MD as PCP - General (Family Practice)  Clarissa Gonzalez RPH as Pharmacist (Pharmacist)

## 2021-05-14 ENCOUNTER — IMMUNIZATION (OUTPATIENT)
Dept: NURSING | Facility: CLINIC | Age: 61
End: 2021-05-14
Payer: COMMERCIAL

## 2021-05-14 PROCEDURE — 0002A PR COVID VAC PFIZER DIL RECON 30 MCG/0.3 ML IM: CPT

## 2021-05-14 PROCEDURE — 91300 PR COVID VAC PFIZER DIL RECON 30 MCG/0.3 ML IM: CPT

## 2021-09-18 ENCOUNTER — HEALTH MAINTENANCE LETTER (OUTPATIENT)
Age: 61
End: 2021-09-18

## 2021-11-13 ENCOUNTER — HEALTH MAINTENANCE LETTER (OUTPATIENT)
Age: 61
End: 2021-11-13

## 2022-01-08 ENCOUNTER — HEALTH MAINTENANCE LETTER (OUTPATIENT)
Age: 62
End: 2022-01-08

## 2022-04-30 ENCOUNTER — HEALTH MAINTENANCE LETTER (OUTPATIENT)
Age: 62
End: 2022-04-30

## 2022-11-19 ENCOUNTER — HEALTH MAINTENANCE LETTER (OUTPATIENT)
Age: 62
End: 2022-11-19

## 2023-04-15 ENCOUNTER — HEALTH MAINTENANCE LETTER (OUTPATIENT)
Age: 63
End: 2023-04-15

## 2023-06-01 ENCOUNTER — HEALTH MAINTENANCE LETTER (OUTPATIENT)
Age: 63
End: 2023-06-01

## 2024-01-28 ENCOUNTER — HEALTH MAINTENANCE LETTER (OUTPATIENT)
Age: 64
End: 2024-01-28

## 2025-05-14 NOTE — TELEPHONE ENCOUNTER
Aortic stenosis: most recent echo in July 2024 did not reveal significant stenosis, only sclerosis.   Patient called back and said Yes Dr.Louisa Murillo is his New PCP    Thank you

## (undated) RX ORDER — ALBUTEROL SULFATE 0.83 MG/ML
SOLUTION RESPIRATORY (INHALATION)
Status: DISPENSED
Start: 2021-04-21